# Patient Record
Sex: FEMALE | Race: OTHER | Employment: UNEMPLOYED | URBAN - METROPOLITAN AREA
[De-identification: names, ages, dates, MRNs, and addresses within clinical notes are randomized per-mention and may not be internally consistent; named-entity substitution may affect disease eponyms.]

---

## 2017-03-25 ENCOUNTER — APPOINTMENT (OUTPATIENT)
Dept: GENERAL RADIOLOGY | Age: 37
End: 2017-03-25
Attending: EMERGENCY MEDICINE
Payer: SUBSIDIZED

## 2017-03-25 ENCOUNTER — HOSPITAL ENCOUNTER (EMERGENCY)
Dept: GENERAL RADIOLOGY | Age: 37
End: 2017-03-25
Attending: EMERGENCY MEDICINE
Payer: SUBSIDIZED

## 2017-03-25 ENCOUNTER — HOSPITAL ENCOUNTER (EMERGENCY)
Age: 37
Discharge: HOME | End: 2017-03-25
Attending: EMERGENCY MEDICINE
Payer: SUBSIDIZED

## 2017-03-25 ENCOUNTER — APPOINTMENT (OUTPATIENT)
Dept: CT IMAGING | Age: 37
End: 2017-03-25
Attending: EMERGENCY MEDICINE
Payer: SUBSIDIZED

## 2017-03-25 VITALS
SYSTOLIC BLOOD PRESSURE: 125 MMHG | HEIGHT: 60 IN | TEMPERATURE: 98.2 F | OXYGEN SATURATION: 99 % | HEART RATE: 81 BPM | WEIGHT: 184.4 LBS | BODY MASS INDEX: 36.2 KG/M2 | DIASTOLIC BLOOD PRESSURE: 82 MMHG | RESPIRATION RATE: 15 BRPM
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
## 2017-03-25 DIAGNOSIS — S00.83XA FACIAL CONTUSION, INITIAL ENCOUNTER: ICD-10-CM

## 2017-03-25 PROCEDURE — 75810000275 HC EMERGENCY DEPT VISIT NO LEVEL OF CARE

## 2017-03-25 PROCEDURE — 90715 TDAP VACCINE 7 YRS/> IM: CPT | Performed by: EMERGENCY MEDICINE

## 2017-03-25 PROCEDURE — 70486 CT MAXILLOFACIAL W/O DYE: CPT

## 2017-03-25 PROCEDURE — 74011250636 HC RX REV CODE- 250/636: Performed by: EMERGENCY MEDICINE

## 2017-03-25 PROCEDURE — 99284 EMERGENCY DEPT VISIT MOD MDM: CPT

## 2017-03-25 PROCEDURE — 70450 CT HEAD/BRAIN W/O DYE: CPT

## 2017-03-25 PROCEDURE — 90471 IMMUNIZATION ADMIN: CPT

## 2017-03-25 RX ADMIN — TETANUS TOXOID, REDUCED DIPHTHERIA TOXOID AND ACELLULAR PERTUSSIS VACCINE, ADSORBED 0.5 ML: 5; 2.5; 8; 8; 2.5 SUSPENSION INTRAMUSCULAR at 03:58
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
## 2017-03-25 NOTE — ED NOTES
Patients neighbor is at bedside, she is providing support and translating for patient.       Patient given water,

## 2017-03-25 NOTE — ED NOTES
MD reviewed discharge instructions and options with patient and patient verbalized understanding. RN reviewed discharge instructions using teachback method. Pt ambulated to exit without difficulty and in no signs of acute distress escorted by family. No complaints or needs expressed at this time.
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
## 2017-03-25 NOTE — DISCHARGE INSTRUCTIONS
Lesión en la roxie: Instrucciones de cuidado - [ Head Injury: Care Instructions ]  Instrucciones de cuidado  La mayoría de las lesiones en la roxie son [place]. [employer], los carr y los raspones en la roxie y la mariluz suelen sanar keanu y pueden tratarse igual que las lesiones en otras partes del cuerpo. Aunque es raro, de vez en cuando puede aparecer un problema más anjel después de foster regreso al [facility]. Por eso, es scott buena idea estar atento a síntomas por un [address] La atención de seguimiento es scott parte clave de foster tratamiento y seguridad. Asegúrese de hacer y acudir a todas las citas, y llame a foster médico si está teniendo problemas. También es scott buena idea saber los resultados de jerilyn exámenes y mantener scott lista de los medicamentos que jessica. ¿Cómo puede cuidarse en el hogar? · Siga las instrucciones de foster médico. Él o aditya le dirá si necesita de alguien que lo observe atentamente fatemeh las 24 horas o más [place]. · No se esfuerce por unos días o más tiempo si no se siente keanu. · Pregúntele a foster médico cuándo puede volver a actividades mohan manejar un coche, montar en bicicleta u operar maquinaria. ¿Cuándo debe pedir ayuda? Llame al 911 en cualquier momento que considere que necesita atención de [place]. Por ejemplo, llame si:  · Tiene un episodio de convulsiones. · Se desmayó (perdió el conocimiento). · Se siente confuso o no puede mantenerse despierto. Llame a foster médico ahora mismo o busque atención médica inmediata si:  · Tiene nuevo o peor vómito. · Se siente menos alerta. · Tiene nueva debilidad o entumecimiento en cualquier parte del cuerpo. Preste especial atención a los [facility], y asegúrese de comunicarse con foster médico si:  · No mejora mohan esperaba. · Tiene nuevos síntomas, mohan grzegorz de [place], problemas para concentrarse o cambios en el estado de ánimo. ¿Dónde puede encontrar más información en inglés?   [place] a http://damion-david.info/. Phyllis Allen W653 en la búsqueda para aprender más acerca de \"Lesión en la roxie: Instrucciones de cuidado - [ Head Injury: Care Instructions ]. \"  Revisado: 7 septiembre, 2016  Versión del contenido: 11.1  © 2446-7424 Healthwise, Incorporated. Las instrucciones de cuidado fueron adaptadas bajo licencia por [employer] (which disclaims liability or warranty for this information). Si usted tiene [place] [employer] afección médica o sobre estas instrucciones, siempre pregunte a foster profesional de frederic. Healthwise, Incorporated niega toda garantía o responsabilidad por foster uso de esta información. Hematoma: Instrucciones de cuidado - [ Hematoma: Care Instructions ]  Instrucciones de cuidado  Un hematoma es un moretón anjel. [place] sucede cuando scott lesión hace que se acumule mae bajo la piel. La mae [employer] a la piel scott sensación esponjosa, gomosa y abultada. Un hematoma generalmente no es motivo de preocupación. No es lo mismo que un coágulo de mae en scott vena, y no causa coágulos de [place]. La atención de seguimiento es scott parte clave de foster tratamiento y seguridad. Asegúrese de hacer y acudir a todas las citas, y llame a foster médico si está teniendo problemas. También es scott buena idea saber los resultados de los exámenes y mantener scott lista de los medicamentos que jessica. ¿Cómo puede cuidarse en el hogar? · Descanse y proteja la georgi amoratada. · Colóquese hielo o scott compresa fría sobre la georgi fatemeh 10 a 20 minutos cada vez. · Apoye la georgi amoratada sobre scott almohada mientras se aplica hielo o en cualquier momento que tome asiento o se acueste fatemeh los 3 días siguientes. Trate de mantenerla por encima del nivel del corazón. [place] ayudará a reducir la hinchazón. · Envolver la georgi amoratada con un vendaje elástico, mohan el \"Ace\", ayudará a disminuir la hinchazón.  No lo ajuste demasiado, ya que puede ocasionar mayor hinchazón debajo de la georgi afectada. · [place] un analgésico (medicamento para el dolor) de venta subha, mohan acetaminofén (Tylenol), ibuprofeno (Advil, Motrin) o naproxeno (Aleve). · No tome dos o más analgésicos al mismo tiempo a menos que el médico se lo haya indicado. Muchos analgésicos contienen acetaminofén, es decir, Tylenol. El exceso de acetaminofén (Tylenol) puede ser dañino. ¿Cuándo debe pedir ayuda? Llame a foster médico ahora mismo o busque atención médica inmediata si:  · Tiene señales de infección en la piel, tales mohan:  ¨ Aumento del dolor, la hinchazón, el enrojecimiento o la temperatura. ¨ Vetas rojizas que comienzan en la georgi. ¨ Pus que supura de la georgi. Mitra Patino. Preste especial atención a los cambios en foster frederic y asegúrese de comunicarse con foster médico si:  · El moretón le ha durado más de 4 semanas. · El moretón se hace más ramos o más doloroso. · No mejora mohan se esperaba. ¿Dónde puede encontrar más información en inglés? Elizabeth Perez a http://damion-david.info/. Heber Ulysses P911 en la búsqueda para aprender más acerca de \"Hematoma: Instrucciones de cuidado - [ Hematoma: Care Instructions ]. \"  Revisado: 27 cuevas, 2016  Versión del contenido: 11.1  © 0596-4256 Healthwise, Incorporated. Las instrucciones de cuidado fueron adaptadas bajo licencia por [employer] (which disclaims liability or warranty for this information). Si usted tiene [place] afección médica o sobre estas instrucciones, siempre pregunte a foster profesional de frederic. Healthwise, Incorporated niega toda garantía o responsabilidad por foster uso de esta información.
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
## 2017-08-06 DIAGNOSIS — E11.65 TYPE 2 DIABETES MELLITUS WITH HYPERGLYCEMIA, WITHOUT LONG-TERM CURRENT USE OF INSULIN (HCC): Primary | ICD-10-CM

## 2017-08-07 ENCOUNTER — OFFICE VISIT (OUTPATIENT)
Dept: FAMILY MEDICINE CLINIC | Age: 37
End: 2017-08-07

## 2017-08-07 ENCOUNTER — HOSPITAL ENCOUNTER (OUTPATIENT)
Dept: LAB | Age: 37
Discharge: HOME | End: 2017-08-07

## 2017-08-07 VITALS
WEIGHT: 194 LBS | HEIGHT: 60 IN | HEART RATE: 79 BPM | DIASTOLIC BLOOD PRESSURE: 64 MMHG | TEMPERATURE: 98.2 F | BODY MASS INDEX: 38.09 KG/M2 | SYSTOLIC BLOOD PRESSURE: 104 MMHG

## 2017-08-07 DIAGNOSIS — R30.0 BURNING WITH URINATION: ICD-10-CM

## 2017-08-07 DIAGNOSIS — E11.65 TYPE 2 DIABETES MELLITUS WITH HYPERGLYCEMIA, WITHOUT LONG-TERM CURRENT USE OF INSULIN (HCC): ICD-10-CM

## 2017-08-07 DIAGNOSIS — E11.65 TYPE 2 DIABETES MELLITUS WITH HYPERGLYCEMIA, WITHOUT LONG-TERM CURRENT USE OF INSULIN (HCC): Primary | ICD-10-CM

## 2017-08-07 LAB
BILIRUB UR QL STRIP: NEGATIVE
EST. AVERAGE GLUCOSE BLD GHB EST-MCNC: 171 MG/DL
GLUCOSE POC: NORMAL MG/DL
GLUCOSE UR-MCNC: NEGATIVE MG/DL
HBA1C MFR BLD: 7.6 % (ref 4.2–6.3)
KETONES P FAST UR STRIP-MCNC: NEGATIVE MG/DL
PH UR STRIP: 5.5 [PH] (ref 4.6–8)
PROT UR QL STRIP: NEGATIVE MG/DL
SP GR UR STRIP: 1.02 (ref 1–1.03)
UA UROBILINOGEN AMB POC: NORMAL (ref 0.2–1)
URINALYSIS CLARITY POC: CLEAR
URINALYSIS COLOR POC: YELLOW
URINE BLOOD POC: NEGATIVE
URINE LEUKOCYTES POC: NEGATIVE
URINE NITRITES POC: NEGATIVE

## 2017-08-07 PROCEDURE — 83036 HEMOGLOBIN GLYCOSYLATED A1C: CPT | Performed by: NURSE PRACTITIONER

## 2017-08-07 RX ORDER — METFORMIN HYDROCHLORIDE 500 MG/1
1000 TABLET, EXTENDED RELEASE ORAL
Qty: 180 TAB | Refills: 1 | Status: SHIPPED | OUTPATIENT
Start: 2017-08-07 | End: 2018-02-26 | Stop reason: SDUPTHER

## 2017-08-07 NOTE — PROGRESS NOTES
[facility] clinic - pt given an appointment October 26th at 9.45 am.  Pt given a copy of the referral form and asked to take $15 to the appointment. Advised that her eyes will be dilated and she needs to have someone to drive her home.

## 2017-08-07 NOTE — PROGRESS NOTES
Assessment/Plan:       ICD-10-CM ICD-9-CM    1. Type 2 diabetes mellitus with hyperglycemia, without long-term current use of insulin ([facility]) E11.65 250.00 AMB POC GLUCOSE, QUANTITATIVE, BLOOD     790.29 HEMOGLOBIN A1C WITH EAG      metFORMIN ER (GLUCOPHAGE XR) 500 mg tablet       DIABETES EYE EXAM   2. Burning with urination R30.0 788.1 AMB POC URINALYSIS DIP STICK AUTO W/O MICRO    Greater than 50% of this 25 minute visit was spent in face-to-face counseling/coordination of care regarding diabetes management. Follow-up Disposition:  Return in about 3 months (around 11/7/2017) for diabetes. [facility]  Subjective:   Shawn Morrison is a 44 y.o. OTHER female who speaks Montserratian. States she was recently at [facility] and failed the eye test.  Chief Complaint   Patient presents with    Diabetes   Last took metformin on Friday. When she  Outpatient Medications Prior to Visit   Medication Sig Dispense Refill    metFORMIN (GLUCOPHAGE) 500 mg tablet Take 1 Tab by mouth two (2) times daily (with meals). Pt states that she takes 1 or 2 a day depending on her diet 60 Tab 1     No facility-administered medications prior to visit. Last ate: today. Measuring glucoses? No Brought in medications? no Last took them: Friday Taking as prescribed? no    .Activity/exercise: no Work/employment: yes 2013, [employer], placed in [place]. Has had pain in the lower abdomen for 2 weeks. She has had more itching when she urinates. ROS:   no polyuria or polydipsia, no chest pain, dyspnea or TIA's, no numbness, tingling or pain in extremities. Social History: She reports that she has never smoked. She does not have any smokeless tobacco history on file. She reports that she does not drink alcohol or use illicit drugs. Family History: Her family history is not on file.   Surgical History:   Past Surgical History:   Procedure Laterality Date    HX APPENDECTOMY       Objective:     Vitals:    08/07/17 1511   BP: 104/64 Pulse: 79   Temp: 98.2 °F (36.8 °C)   TempSrc: Oral   Weight: 194 lb (88 kg)   Height: 5' (1.524 m)    No LMP recorded (lmp unknown). She has an implant (IUD). Results for orders placed or performed in visit on 08/07/17   AMB POC GLUCOSE, QUANTITATIVE, BLOOD   Result Value Ref Range    Glucose  non fasting mg/dL   AMB POC URINALYSIS DIP STICK AUTO W/O MICRO   Result Value Ref Range    Color (UA POC) Yellow     Clarity (UA POC) Clear     Glucose (UA POC) Negative Negative    Bilirubin (UA POC) Negative Negative    Ketones (UA POC) Negative Negative    Specific gravity (UA POC) 1.020 1.001 - 1.035    Blood (UA POC) Negative Negative    pH (UA POC) 5.5 4.6 - 8.0    Protein (UA POC) Negative Negative mg/dL    Urobilinogen (UA POC) 0.2 mg/dL 0.2 - 1    Nitrites (UA POC) Negative Negative    Leukocyte esterase (UA POC) Negative Negative      Wt Readings from Last 2 Encounters:   08/07/17 194 lb (88 kg)   03/25/17 184 lb 6.4 oz (83.6 kg)    Weight increased; Hemoglobin A1c   Date Value Ref Range Status   06/07/2016 8.1 (H) 4.2 - 6.3 % Final    No results found for: GESTF, MCACR, MCA1, MCA2, MCA3, MCAU, OBINNA, CREAPOC, MCREA, ACREA, CREA, REFC3, REFC4 No results found for: CGFR, GFRAA, GFRNA  No results found for: CHOL, CHOLX, CHLST, CHOLV, HDL, LDL, LDLC, DLDLP, TGLX, TRIGL, TRIGP, CHHD, CHHDX No results found for: GPT, ALT, SGOT, GGT, GGTP, AP, APIT, APX, CBIL, TBIL, TBILI  Constitutional: She appears well-developed. Eyes: EOM are normal. Pupils are equal, round, and reactive to light. Neck: Neck supple. No thyromegaly present. Cardiovascular: Normal rate, regular rhythm, normal heart sounds and intact distal pulses. No murmur heard. Pulmonary/Chest: Effort normal and breath sounds normal.   Musculoskeletal: She exhibits no edema. No ulcers of the lower extremities. Assessment/Plan:   Diagnoses and all orders for this visit:    1.  Type 2 diabetes mellitus with hyperglycemia, without long-term current use of insulin (HCC)  -     AMB POC GLUCOSE, QUANTITATIVE, BLOOD  -     HEMOGLOBIN A1C WITH EAG; Future  -     metFORMIN ER (GLUCOPHAGE XR) 500 mg tablet; Take 2 Tabs by mouth daily (with dinner). For diabetes Urdu sig  -     HM DIABETES EYE EXAM    2. Burning with urination  -     AMB POC URINALYSIS DIP STICK AUTO W/O MICRO    Diabetes Mellitus type 2, under uncertain  control. Blood pressure under Good control. Discussed OK to take 1 metformin po qday; most importantly, she needs to take medication every day. Greater than 50% of this 25 minute visit was spent in face-to-face counseling/coordination of care regarding diabetes management. Follow-up Disposition:  Return in about 3 months (around 11/7/2017) for diabetes. Ksenia Siddiqui DNP, FNP-BC, BC-ADM  Brock Hung expressed understanding of this plan.

## 2017-08-07 NOTE — MR AVS SNAPSHOT
Visit Information Monetta Gottron y [place] Personal Médico Departamento Teléfono del Dep. Número de visita 8/7/2017  3:15 PM Vinny Merino [phone] Upcoming Health Maintenance Date Due  
 LIPID PANEL Q1 1979 FOOT EXAM Q1 5/27/1989 MICROALBUMIN Q1 5/27/1989 EYE EXAM RETINAL OR DILATED Q1 5/27/1989 Pneumococcal 19-64 Medium Risk (1 of 1 - PPSV23) 5/27/1998 HEMOGLOBIN A1C Q6M 12/7/2016 INFLUENZA AGE 9 TO ADULT 8/1/2017 PAP AKA CERVICAL CYTOLOGY 6/7/2019 DTaP/Tdap/Td series (2 - Td) 3/25/2027 Alergias  Review Complete El: 8/7/2017 Por: Aileen Almonte LPN A partir del:  8/7/2017 No Known Allergies Vacunas actuales Steve Mean Vickye Ramp Tdap 3/25/2017  3:58 AM  
  
 No revisadas esta visita You Were Diagnosed With   
  
 Agustina Kaplan Type 2 diabetes mellitus with hyperglycemia, without long-term current use of insulin (HCC)    -  Primary ICD-10-CM: E11.65 ICD-9-CM: 250.00, 790.29 Burning with urination     ICD-10-CM: R30.0 ICD-9-CM: 318. 1 Partes vitales PS Pulso Temperatura [place] ( percentil de crecimiento) Peso (percentil de crecimiento) LMP (última anai) 104/64 79 98.2 °F (36.8 °C) (Oral) 5' (1.524 m) 194 lb (88 kg) (LMP Unknown) BMI [facility]) Estado obstétrico Estatus de tabaquísmo 37.89 kg/m2 Having regular periods Never Smoker Historial de signos vitales BMI and BSA Data Body Mass Index Body Surface Area  
 37.89 kg/m 2 1.93 m 2 [facility] Name Phone [facility] [address] [place] [address] [phone] Harris lista de medicamentos actualizada Lista actualizada el: 8/7/17  3:58 PM.  Shaun Fulton use harris lista de medicamentos más reciente. metFORMIN  mg tablet También conocido mohan:  GLUCOPHAGE XR  
 Take 2 Tabs by mouth daily (with dinner). For diabetes Mongolian sig Recetas Enviado a la [place] Refills  
 metFORMIN ER (GLUCOPHAGE XR) 500 mg tablet 1 Sig: Take 2 Tabs by mouth daily (with dinner). For diabetes Mongolian sig Class: Normal  
 Pharmacy: [address] #: [phone] Route: Oral  
  
Hicimos lo siguiente AMB POC GLUCOSE, QUANTITATIVE, BLOOD [37568 CPT(R)] AMB POC URINALYSIS DIP STICK AUTO W/O MICRO [70751 CPT(R)] Introducing [facility] [place] [facility]! [facility] introduce portal paciente MyChart . Ahora se puede acceder a partes de foster expediente médico, enviar por correo electrónico la oficina de foster médico y solicitar renovaciones de medicamentos en línea. En foster navegador de Internet , Jimmy Lopez a [email or web address] Cuca clic en el usuario por Margarite [place]? Humberto Deep clic aquí en la sesión Jnena Due. Verá la página de registro [place]. Ingrese fotser código de [employer] michael y mohan aparece a continuación. Usted no tendrá que [employer] código después de deandra completado el proceso de registro . Si usted no se inscribe antes de la fecha de caducidad , debe solicitar un nuevo código. · MyChart Código de acceso : [id number] Expires: 11/5/2017  3:58 PM 
 
Ingresa los últimos cuatro dígitos de foster Número de Seguro Social ( xxxx ) y fecha de nacimiento ( dd / mm / aaaa ) mohan se indica y cuca clic en Enviar. Usted será llevado a la siguiente página de registro . Crear un ID MyChart . Esta será foster ID de inicio de sesión de MyChart y no puede ser [place] , por lo que pensar en scott que es [facility] y fácil de recordar . Crear scott contraseña MyChart . Usted puede cambiar foster contraseña en cualquier momento . Ingrese foster Password Reset de preguntas y [place] . [place] se puede utilizar en un momento posterior si usted olvida foster contraseña. Introduzca foster dirección de correo electrónico . Radha Fabry recibirá scott notificación por correo electrónico cuando la nueva información está disponible en MyChart . Mitchell Reina clic en Registrarse. Corena Connell becki y descargar porciones de foster expediente médico. 
Alison clic en el enlace de descarga del menú Resumen para descargar scott copia portátil de foster información médica . Si tiene [employer] , por favor visite la sección de preguntas frecuentes del sitio web MyChart . Recuerde, MyChart NO es que se utilizará para las necesidades urgentes. Para emergencias médicas , llame al 911 . Ahora disponible en foster iPhone y Android ! Por favor proporcione raul resumen de la documentación de cuidado a foster próximo proveedor. Your primary care clinician is listed as NONE. If you have any questions after today's visit, please call [phone].

## 2017-08-07 NOTE — PROGRESS NOTES
Chief Complaint   Patient presents with    Diabetes     Coordination of Care  1. Have you been to the ER, urgent care clinic since your last visit? Hospitalized since your last visit? No    2. Have you seen or consulted any other health care providers outside of the [address] [place] [address] since your last visit? Include any pap smears or colon screening. No    Medications  Medication Reconciliation Performed: yes  Patient does not need refills     Learning Assessment Complete?  yes

## 2017-09-28 ENCOUNTER — OFFICE VISIT (OUTPATIENT)
Dept: FAMILY MEDICINE CLINIC | Age: 37
End: 2017-09-28

## 2017-09-28 VITALS
BODY MASS INDEX: 39.07 KG/M2 | WEIGHT: 199 LBS | TEMPERATURE: 97.8 F | DIASTOLIC BLOOD PRESSURE: 86 MMHG | HEART RATE: 89 BPM | HEIGHT: 60 IN | SYSTOLIC BLOOD PRESSURE: 104 MMHG

## 2017-09-28 DIAGNOSIS — K04.7 DENTAL INFECTION: ICD-10-CM

## 2017-09-28 DIAGNOSIS — M79.641 CHRONIC HAND PAIN, RIGHT: Primary | ICD-10-CM

## 2017-09-28 DIAGNOSIS — G89.29 CHRONIC HAND PAIN, RIGHT: Primary | ICD-10-CM

## 2017-09-28 PROBLEM — Z98.890 H/O CARPAL TUNNEL REPAIR: Status: ACTIVE | Noted: 2017-09-28

## 2017-09-28 RX ORDER — NAPROXEN 500 MG/1
500 TABLET ORAL 2 TIMES DAILY WITH MEALS
Qty: 40 TAB | Refills: 1 | Status: SHIPPED | OUTPATIENT
Start: 2017-09-28 | End: 2018-02-25

## 2017-09-28 RX ORDER — AMOXICILLIN 500 MG/1
500 CAPSULE ORAL 3 TIMES DAILY
Qty: 30 CAP | Refills: 0 | Status: SHIPPED | OUTPATIENT
Start: 2017-09-28 | End: 2017-10-08

## 2017-09-28 NOTE — PROGRESS NOTES
Subjective:     Chief Complaint   Patient presents with    Gum Problem     pt c/o pain and swelling on left side of mouth    Wrist Pain     pt c/o pain on wrist and right arm        She  is a 44 y.o. female who presents for evaluation of L sided mouth/face pain. Onset yesterday and denies any new or recent dental work. ROS  Gen - no fever/chills  Resp - no dyspnea or cough  CV - no chest pain or DOW  Rest per HPI    Past Medical History:   Diagnosis Date    Diabetes ([address]     IUD (intrauterine device) since 2013      Past Surgical History:   Procedure Laterality Date    HX APPENDECTOMY       Current Outpatient Prescriptions on File Prior to Visit   Medication Sig Dispense Refill    metFORMIN ER (GLUCOPHAGE XR) 500 mg tablet Take 2 Tabs by mouth daily (with dinner). For diabetes Latvian sig 180 Tab 1     No current facility-administered medications on file prior to visit. Objective:     Vitals:    09/28/17 1540   BP: 104/86   Pulse: 89   Temp: 97.8 °F (36.6 °C)   TempSrc: Oral   Weight: 199 lb (90.3 kg)   Height: 5' (1.524 m)       Physical Examination:  General appearance - alert, well appearing, and in no distress  Eyes -sclera anicteric  Neck - supple, no significant adenopathy, no thyromegaly  Chest - clear to auscultation, no wheezes, rales or rhonchi, symmetric air entry  Heart - normal rate, regular rhythm, normal S1, S2, no murmurs, rubs, clicks or gallops  Neurological - alert, oriented, no focal findings or movement disorder noted    Noted scarring on R medial wrist at radial margin, + tenderness  Upper L rear molar appears impacted, generally poor dentition      Assessment/ Plan:   Diagnoses and all orders for this visit:    1. Chronic hand pain, right  -     REFERRAL TO ORTHOPEDICS  -     XR WRIST RT AP/LAT; Future  -     XR HAND RT MIN 3 V; Future  -     naproxen (NAPROSYN) 500 mg tablet; Take 1 Tab by mouth two (2) times daily (with meals).     2. Dental infection  - amoxicillin (AMOXIL) 500 mg capsule; Take 1 Cap by mouth three (3) times daily for 10 days. Given Pt's Hx of R carpal tunnel repair, suspect her chronic pain r/t scar tissue. Refer for plain films and AN for ortho eval given low possibility steroid injections would resolve her S&S per her Hx. PRN naproxen and non-pharm pain relief discussed. Refer to DDS for formal eval, resource sheet given. I have discussed the diagnosis with the patient and the intended plan as seen in the above orders. The patient has received an after-visit summary and questions were answered concerning future plans. I have discussed medication side effects and warnings with the patient as well. The patient verbalizes understanding and agreement with the plan.     Follow-up Disposition: Not on File

## 2017-09-28 NOTE — PROGRESS NOTES
Coordination of Care  1. Have you been to the ER, urgent care clinic since your last visit? Hospitalized since your last visit? No    2. Have you seen or consulted any other health care providers outside of the [address] [place] [address] since your last visit? Include any pap smears or colon screening. No    Medications  Medication Reconciliation Performed: no  Patient does not know need refills     Learning Assessment Complete?  yes

## 2017-09-28 NOTE — MR AVS SNAPSHOT
Visit Information Desiree Reid Personal Médico Departamento Teléfono del Dep. Número de visita 9/28/2017  3:35 PM Vinny Rodriguez [facility] [phone] [id number] Follow-up Instructions Return if symptoms worsen or fail to improve. Upcoming Health Maintenance Date Due  
 LIPID PANEL Q1 1980 FOOT EXAM Q1 5/27/1989 MICROALBUMIN Q1 5/27/1989 EYE EXAM RETINAL OR DILATED Q1 5/27/1989 Pneumococcal 19-64 Medium Risk (1 of 1 - PPSV23) 5/27/1998 INFLUENZA AGE 9 TO ADULT 8/1/2017 HEMOGLOBIN A1C Q6M 2/7/2018 PAP AKA CERVICAL CYTOLOGY 6/7/2019 DTaP/Tdap/Td series (2 - Td) 3/25/2027 Alergias  Review Complete El: 9/28/2017 Por: MAYRA Rodriguez del:  9/28/2017 No Known Allergies Vacunas actuales Mukund Ogden Tdap 3/25/2017  3:58 AM  
  
 No revisadas esta visita You Were Diagnosed With   
  
 Myra Orn Chronic hand pain, right    -  Primary ICD-10-CM: M79.641, G89.29 ICD-9-CM: 729.5, 338.29 Dental infection     ICD-10-CM: K04.7 ICD-9-CM: 522.4 Partes vitales PS Pulso Temperatura [place] ( percentil de crecimiento) Peso (percentil de crecimiento) LMP (última anai) 104/86 (BP 1 Location: Left arm) 89 97.8 °F (36.6 °C) (Oral) 5' (1.524 m) 199 lb (90.3 kg) 09/13/2017 BMI [facility]) Estado obstétrico Estatus de tabaquísmo 38.86 kg/m2 Having regular periods Never Smoker Historial de signos vitales BMI and BSA Data Body Mass Index Body Surface Area  
 38.86 kg/m 2 1.96 m 2 Taina Yates [facility] Name Phone [facility] [address] [place] [address] [phone] Harris lista de medicamentos actualizada Lista actualizada el: 9/28/17  4:25 PM.  Jadynshannoncee Koehlerch use harris lista de medicamentos más reciente. amoxicillin 500 mg capsule También conocido mohan:  AMOXIL Take 1 Cap by mouth three (3) times daily for 10 days. metFORMIN  mg tablet También conocido mohan:  GLUCOPHAGE XR Take 2 Tabs by mouth daily (with dinner). For diabetes Divehi sig  
  
 naproxen 500 mg tablet También conocido mohan:  NAPROSYN Take 1 Tab by mouth two (2) times daily (with meals). Recetas Enviado a la [place] Refills  
 amoxicillin (AMOXIL) 500 mg capsule 0 Sig: Take 1 Cap by mouth three (3) times daily for 10 days. Class: Normal  
 Pharmacy: [facility] [place] [address] Ph #: [phone] Route: Oral  
 naproxen (NAPROSYN) 500 mg tablet 1 Sig: Take 1 Tab by mouth two (2) times daily (with meals). Class: Normal  
 Pharmacy: [facility] [address] [place] [address] Ph #: [phone] Route: Oral  
  
Hicimos lo siguiente REFERRAL TO ORTHOPEDICS [CND835 Custom] Comentarios: Access Now Referral Request Form: 
 
Has the patient live in the area for 6 months Yes Is the patient here on a visa No 
 
Priority: 
 
4 weeks Location: [facility] Patient Demographics: 
 
Date:  9/28/2017                          EMR# [id number] Audra Taveras 1980 Home Phone : [phone]             Cell Phone:   
 
Language :  Divehi Specialist Requested:  Ortho hand Reason for Request:  Chronic hand pain s/p carpal tunnel repair in 2004 Specialist Requirements: 
 
If GYN Referral:   
Pap: n/a If Ortho Referral: MRI no      
XRAY: yes Pulmonary Referrals must have :  
C-X-ray no OR  
CT Scan no Referring Provider:  Carlos Sterling NP Instrucciones de seguimiento Return if symptoms worsen or fail to improve. Por hacer 10/06/2017 Imaging:  XR HAND RT MIN 3 V   
  
 10/06/2017 Imaging:  XR WRIST RT AP/LAT Informacion de [employer]  Codigo de Referencia Referido por Referido a  
  
 [id number] Mervat Perez No disponible Visitas Estado [facility] de inicio [facility] final  
 1 New Request 9/28/17 9/28/18 Si foster referencia tiene un estado de \"pending review\" o \"denied\" , informacion adicional sera enviada para apoyar el resultado de esta decision. Instrucciones para el Paciente [facility] dental - [ [address] [place] [address] ] Qué es el cuidado dental básico? 
 
El cuidado dental básico consiste en cepillarse los dientes y pasarse el hilo dental regularmente para eliminar la placa. La placa es scott capa delgada de bacterias que se adhiere a los dientes por encima y por debajo de la línea de las encías. Puede acumularse y endurecerse hasta convertirse en sarro, lo cual hace más difícil limpiar keanu los dientes. El sarro generalmente tiene que ser Geroldine Sarthak por un higienista dental. 
Las bacterias en la placa usan azúcares para producir ácidos. Estos ácidos pueden dañar las encías y los dientes. Asegúrese de visitar a foster dentista y a foster higienista dental para controles y limpiezas regulares. Cómo puede afectar a foster frederic el cuidado dental? 
La práctica del cuidado dental básico: · Elimina la placa que puede causar enfermedad de las encías y caries dental. La caries dental puede conducir a la formación de un agujero en el diente. · Ayuda a prevenir el mal aliento. Cepillarse los dientes y usar hilo dental eliminan de la boca las bacterias que causan el mal [place]. · Ayuda a mantener los dientes blancos previniendo las [facility] Floor a los Bystr[place] u Policky, las bebidas y el tabaco. 
· Hace posible que los dientes ciara toda la bell. Qué puede hacer para prevenir los problemas dentales? · Cepíllese los [place] al día, por la mañana y por la noche. ¨ Utilice un cepillo de dientes con cerdas suaves y redondeadas y [facility] roxie que sea lo suficientemente pequeña mohan para llegar a todas las partes de los dientes y la boca. ¨ Reemplace foster cepillo de dientes cada 3 o 4 meses. ¨ Utilice scott pasta dental con flúor. ¨ Coloque el cepillo en un ángulo de 39 grados sobre el lugar donde los dientes se unen a las encías. Presione con firmeza y [place] suavemente el cepillo hacia atrás y hacia adelante con pequeños movimientos circulares. ¨ Cepille vigorosamente las superficies de masticación con trazos cortos [place] y [place] atrás. ¨ Cepíllese la lengua de atrás [place]. · Pásese el hilo dental al menos scott vez al día. Elija el tipo y el sabor que más le guste. · Programe controles y limpiezas con la frecuencia que le recomiende foster dentista. · Siga scott dieta saludable para ayudar a mantener las encías sanas y los dientes isatu. Opte por alimentos que amada buenos para jerilyn dientes, mohan granos integrales, verduras, frutas así mohan alimentos que tengan un bajo contenido de grasas saturadas y Marie. El [address] [place] [address] y otros quesos, la mantequilla de cacahuate [place]), el yogur y Mat Miles son buenos para los dientes. Los chicles sin azúcar (especialmente los que tienen [place]) también son Natalie Bogus buena opción. · Evite los alimentos que contienen mucha azúcar, especialmente los alimentos dulces y pegajosos mohan los caramelos blandos. · No coma refrigerios antes de acostarse. La comida que [employer] dientes tiene más probabilidad de causar caries fatemeh la noche. · No fume ni consuma productos de tabaco sin humo. El tabaco puede empeorar las caries dentales. Si necesita ayuda para dejar de fumar, hable con foster médico sobre programas y medicamentos para dejar de fumar. Estos pueden aumentar jerilyn probabilidades de dejar el hábito para siempre. Dónde puede encontrar más información en inglés? Michael Nations a http://damion-david.info/. Kentrell Cortez N755 en la búsqueda para aprender más acerca de \"Aprenda sobre el cuidado dental - [ Valeen Fairly ]. \" 
Revisado: 9 josé, 2016 Versión del contenido: 11.3 © 7652-3363 Healthwise, Incorporated. Las instrucciones de cuidado fueron adaptadas bajo licencia por [employer]ctions (which disclaims liability or warranty for this information). Si usted tiene [place] afección médica o sobre estas instrucciones, siempre pregunte a foster profesional de frederic. Healthwise, Incorporated niega toda garantía o responsabilidad por foster uso de esta información. Introducing [facility]! Giorgio Frances introduce portal paciente MyChart . Ahora se puede acceder a partes de foster expediente médico, enviar por correo electrónico la oficina de foster médico y solicitar renovaciones de medicamentos en línea. En foster navegador de Internet , Pedro Barnhart a [email or web address] Cuca clic en el usuario por Riposmany Zepeda? Chong dioric aquí en la sesión Muna Nereyda. Verá la página de registro [place]. Ingrese foster código de Bank of Jaylin michael y mohan aparece a continuación. Usted no tendrá que [employer] código después de deandra completado el proceso de registro . Si usted no se inscribe antes de la fecha de caducidad , debe solicitar un nuevo código. · MyChart Código de acceso : [id number] Expires: 11/5/2017  3:58 PM 
 
Ingresa los últimos cuatro dígitos de foster Número de Seguro Social ( xxxx ) y fecha de nacimiento ( dd / mm / aaaa ) mohan se indica y cuca clic en Enviar. Kiet será llevado a la siguiente página de registro . Crear un ID MyChart . Esta será foster ID de inicio de sesión de MyChart y no puede ser [place] , por lo que pensar en scott que es Vilma Mood y fácil de recordar . Crear scott contraseña MyChart . Usted puede cambiar foster contraseña en cualquier momento . Ingrese foster Password Reset de preguntas y Ascencio . [place] se puede utilizar en un momento posterior si usted olvida foster contraseña. Introduzca foster dirección de correo electrónico . Carey Rosado recibirá scott notificación por correo electrónico cuando la nueva información está disponible en MyChart . Estil Brayden ruiz en Registrarse. Lattie Melter becki y descargar porciones de foster expediente médico. 
Alison joseph en el enlace de descarga del menú Resumen para descargar scott copia portátil de foster información médica . Si tiene [employer] , por favor visite la sección de preguntas frecuentes del sitio web MyChart . Recuerde, MyChart NO es que se utilizará para las necesidades urgentes. Para emergencias médicas , llame al 911 . Ahora disponible en foster iPhone y Android ! Por favor proporcione raul resumen de la documentación de cuidado a foster próximo proveedor. Your primary care clinician is listed as NONE. If you have any questions after today's visit, please call [phone].

## 2017-09-28 NOTE — PATIENT INSTRUCTIONS
Aprenda sobre el cuidado dental - [ [facility] ]  ¿Qué es el cuidado dental básico?    El cuidado dental básico consiste en cepillarse los dientes y pasarse el hilo dental regularmente para eliminar la placa. La placa es scott capa delgada de bacterias que se adhiere a los dientes por encima y por debajo de la línea de las encías. Puede acumularse y endurecerse hasta convertirse en sarro, lo cual hace más difícil limpiar keanu los dientes. El sarro generalmente tiene que ser Geroldine [place] por un higienista dental.  Las bacterias en la placa usan azúcares para producir ácidos. Estos ácidos pueden dañar las encías y los dientes. Asegúrese de visitar a foster dentista y a foster higienista dental para controles y limpiezas regulares. ¿Cómo puede afectar a foster frederic el cuidado dental?  La práctica del cuidado dental básico:  · Elimina la placa que puede causar enfermedad de las encías y caries dental. La caries dental puede conducir a la formación de un agujero en el diente. · Ayuda a prevenir el mal aliento. Cepillarse los dientes y usar hilo dental eliminan de la boca las bacterias que causan el mal [place]. · Ayuda a mantener los dientes blancos previniendo las [facility] Floor a los [place]str[place] u [place], las bebidas y el tabaco.  · Hace posible que los dientes ciara toda la bell. ¿Qué puede hacer para prevenir los problemas dentales? · Cepíllese los [place] al día, por la mañana y por la noche. ¨ Utilice un cepillo de dientes con cerdas suaves y redondeadas y [employer] rxoie que sea lo suficientemente pequeña mohan para llegar a todas las partes de los dientes y la boca. ¨ Reemplace foster cepillo de dientes cada 3 o 4 meses. ¨ Utilice scott pasta dental con flúor. ¨ Coloque el cepillo en un ángulo de 39 grados sobre el lugar donde los dientes se unen a las encías. Pres[place] con firmeza y [place] suavemente el cepillo hacia atrás y hacia adelante con pequeños movimientos circulares.   [address] superficies de masticación con trazos cortos hacia adelante y [place] atrás. ¨ Cepíllese la lengua de atrás [employer]. · Pásese el hilo dental al menos scott vez al día. Elija el tipo y el sabor que más le guste. · Programe controles y limpiezas con la frecuencia que le recomiende foster dentista. · Siga scott dieta saludable para ayudar a mantener las encías sanas y los dientes isatu. Opte por alimentos que amada buenos para jerilyn dientes, mohan granos integrales, verduras, frutas así mohan alimentos que tengan un bajo contenido de grasas saturadas y Marie. El [facility] [address] y otros quesos, la mantequilla de cacahuate [place]), el yogur y Mat Miles son buenos para los dientes. Los chicles sin azúcar (especialmente los que tienen [place]) también son [facility] buena opción. · Evite los alimentos que contienen mucha azúcar, especialmente los alimentos dulces y pegajosos mohan los caramelos blandos. · No coma refrigerios antes de acostarse. La comida que [employer] dientes tiene más probabilidad de causar caries fatemeh la noche. · No fume ni consuma productos de tabaco sin humo. El tabaco puede empeorar las caries dentales. Si necesita ayuda para dejar de fumar, hable con foster médico sobre programas y medicamentos para dejar de fumar. Estos pueden aumentar jerilyn probabilidades de dejar el hábito para siempre. ¿Dónde puede encontrar más información en inglés? Michael Malcolm a http://damion-david.info/. Kentrell Danielsa R417 en la búsqueda para aprender más acerca de \"Aprenda sobre el cuidado dental - [ Valeen Fairly ]. \"  Revisado: 9 josé, 2016  Versión del contenido: 11.3  © 5014-0960 [employer]. Las instrucciones de cuidado fueron adaptadas bajo licencia por Good Help Connections (which disclaims liability or warranty for this information). Si usted tiene [place] afección médica o sobre estas instrucciones, siempre pregunte a foster profesional de frederic.  [employer] niega toda garantía o responsabilidad por foster uso de esta información.

## 2017-09-29 ENCOUNTER — TELEPHONE (OUTPATIENT)
Dept: FAMILY MEDICINE CLINIC | Age: 37
End: 2017-09-29

## 2017-09-29 NOTE — TELEPHONE ENCOUNTER
Avel Smith spoke to Shawn Morrison and scheduled AN screening appointment on 10/17/17 @ 3:00 pm.    Renita Daniel

## 2017-10-17 ENCOUNTER — OFFICE VISIT (OUTPATIENT)
Dept: FAMILY MEDICINE CLINIC | Age: 37
End: 2017-10-17

## 2017-10-17 DIAGNOSIS — Z71.89 COUNSELING AND COORDINATION OF CARE: Primary | ICD-10-CM

## 2017-10-24 ENCOUNTER — TELEPHONE (OUTPATIENT)
Dept: FAMILY MEDICINE CLINIC | Age: 37
End: 2017-10-24

## 2017-10-24 NOTE — TELEPHONE ENCOUNTER
Spoke to the patient via [employer]  #[id number]. Pt was told that she has to have the wrist/hand x-rays done before Access Now referral can be submitted. Explained that she needs to go to [facility] to have xray done. [facility] will go [facility]. She is concerned about the bills, suggested she ask to speak to a financial counselor after she has her xray done. Explained that she should not call AN to set up an appointment. After I have the xray results I will submit the referral and will call her to let her know when she can call the AN appointment line.

## 2017-10-26 ENCOUNTER — HOSPITAL ENCOUNTER (OUTPATIENT)
Dept: GENERAL RADIOLOGY | Age: 37
Discharge: HOME | End: 2017-10-26
Payer: SUBSIDIZED

## 2017-10-26 DIAGNOSIS — M79.641 CHRONIC HAND PAIN, RIGHT: ICD-10-CM

## 2017-10-26 DIAGNOSIS — G89.29 CHRONIC HAND PAIN, RIGHT: ICD-10-CM

## 2017-10-26 PROCEDURE — 73110 X-RAY EXAM OF WRIST: CPT

## 2017-10-26 PROCEDURE — 73130 X-RAY EXAM OF HAND: CPT

## 2017-12-08 ENCOUNTER — HOSPITAL ENCOUNTER (OUTPATIENT)
Dept: LAB | Age: 37
Discharge: HOME | End: 2017-12-08

## 2017-12-08 ENCOUNTER — OFFICE VISIT (OUTPATIENT)
Dept: FAMILY MEDICINE CLINIC | Age: 37
End: 2017-12-08

## 2017-12-08 VITALS
TEMPERATURE: 98.2 F | WEIGHT: 202 LBS | DIASTOLIC BLOOD PRESSURE: 69 MMHG | BODY MASS INDEX: 39.66 KG/M2 | HEIGHT: 60 IN | HEART RATE: 77 BPM | SYSTOLIC BLOOD PRESSURE: 118 MMHG

## 2017-12-08 DIAGNOSIS — F41.9 ANXIETY: Primary | ICD-10-CM

## 2017-12-08 DIAGNOSIS — F41.9 ANXIETY: ICD-10-CM

## 2017-12-08 DIAGNOSIS — R07.89 CHEST DISCOMFORT: ICD-10-CM

## 2017-12-08 DIAGNOSIS — E11.65 TYPE 2 DIABETES MELLITUS WITH HYPERGLYCEMIA, WITHOUT LONG-TERM CURRENT USE OF INSULIN (HCC): ICD-10-CM

## 2017-12-08 DIAGNOSIS — N92.0 MENORRHAGIA WITH REGULAR CYCLE: ICD-10-CM

## 2017-12-08 LAB
ALBUMIN SERPL-MCNC: 3.7 G/DL (ref 3.5–5)
ALBUMIN/GLOB SERPL: 1 {RATIO} (ref 1.1–2.2)
ALP SERPL-CCNC: 105 U/L (ref 45–117)
ALT SERPL-CCNC: 40 U/L (ref 12–78)
ANION GAP SERPL CALC-SCNC: 7 MMOL/L (ref 5–15)
AST SERPL-CCNC: 27 U/L (ref 15–37)
BILIRUB SERPL-MCNC: 0.5 MG/DL (ref 0.2–1)
BUN SERPL-MCNC: 17 MG/DL (ref 6–20)
BUN/CREAT SERPL: 23 (ref 12–20)
CALCIUM SERPL-MCNC: 8.8 MG/DL (ref 8.5–10.1)
CHLORIDE SERPL-SCNC: 100 MMOL/L (ref 97–108)
CO2 SERPL-SCNC: 28 MMOL/L (ref 21–32)
CREAT SERPL-MCNC: 0.74 MG/DL (ref 0.55–1.02)
ERYTHROCYTE [DISTWIDTH] IN BLOOD BY AUTOMATED COUNT: 12.8 % (ref 11.5–14.5)
EST. AVERAGE GLUCOSE BLD GHB EST-MCNC: 200 MG/DL
GLOBULIN SER CALC-MCNC: 3.6 G/DL (ref 2–4)
GLUCOSE SERPL-MCNC: 193 MG/DL (ref 65–100)
HBA1C MFR BLD: 8.6 % (ref 4.2–6.3)
HCT VFR BLD AUTO: 40.3 % (ref 35–47)
HGB BLD-MCNC: 13.7 G/DL (ref 11.5–16)
MCH RBC QN AUTO: 30 PG (ref 26–34)
MCHC RBC AUTO-ENTMCNC: 34 G/DL (ref 30–36.5)
MCV RBC AUTO: 88.4 FL (ref 80–99)
PLATELET # BLD AUTO: 230 K/UL (ref 150–400)
POTASSIUM SERPL-SCNC: 3.9 MMOL/L (ref 3.5–5.1)
PROT SERPL-MCNC: 7.3 G/DL (ref 6.4–8.2)
RBC # BLD AUTO: 4.56 M/UL (ref 3.8–5.2)
SODIUM SERPL-SCNC: 135 MMOL/L (ref 136–145)
TSH SERPL DL<=0.05 MIU/L-ACNC: 2.77 UIU/ML (ref 0.36–3.74)
WBC # BLD AUTO: 9.9 K/UL (ref 3.6–11)

## 2017-12-08 PROCEDURE — 83036 HEMOGLOBIN GLYCOSYLATED A1C: CPT | Performed by: FAMILY MEDICINE

## 2017-12-08 PROCEDURE — 80053 COMPREHEN METABOLIC PANEL: CPT | Performed by: FAMILY MEDICINE

## 2017-12-08 PROCEDURE — 84443 ASSAY THYROID STIM HORMONE: CPT | Performed by: FAMILY MEDICINE

## 2017-12-08 PROCEDURE — 85027 COMPLETE CBC AUTOMATED: CPT | Performed by: FAMILY MEDICINE

## 2017-12-08 RX ORDER — CITALOPRAM 20 MG/1
TABLET, FILM COATED ORAL
Qty: 30 TAB | Refills: 2 | Status: SHIPPED | OUTPATIENT
Start: 2017-12-08 | End: 2018-02-26

## 2017-12-08 NOTE — MR AVS SNAPSHOT
Visit Information Mary Kay Hernandez Personal Médico Departamento Teléfono del Dep. Número de visita 12/8/2017 11:30 AM Pancho FloresSylvia [phone] Follow-up Instructions Return in about 3 months (around 3/8/2018), or if symptoms worsen or fail to improve. Upcoming Health Maintenance Date Due  
 LIPID PANEL Q1 1980 FOOT EXAM Q1 5/27/1989 MICROALBUMIN Q1 5/27/1989 Pneumococcal 19-64 Medium Risk (1 of 1 - PPSV23) 5/27/1998 Influenza Age 5 to Adult 8/1/2017 HEMOGLOBIN A1C Q6M 2/7/2018 EYE EXAM RETINAL OR DILATED Q1 10/26/2018 PAP AKA CERVICAL CYTOLOGY 6/7/2019 DTaP/Tdap/Td series (2 - Td) 3/25/2027 Alergias  Review Complete El: 12/8/2017 Por: Pancho Flores MD  
 A partir del:  12/8/2017 No Known Allergies Vacunas actuales Yessica Donaldsonn Gal Tdap 3/25/2017  3:58 AM  
  
 No revisadas esta visita You Were Diagnosed With   
  
 [place]esa [place] Anxiety    -  Primary ICD-10-CM: F41.9 ICD-9-CM: 300.00 Chest discomfort     ICD-10-CM: R07.89 ICD-9-CM: 786.59 Type 2 diabetes mellitus with hyperglycemia, without long-term current use of insulin (HCC)     ICD-10-CM: E11.65 ICD-9-CM: 250.00, 790.29 Menorrhagia with regular cycle     ICD-10-CM: N92.0 ICD-9-CM: 626.2 Partes vitales PS Pulso Temperatura [place] ( percentil de crecimiento) Peso (percentil de crecimiento) LMP (última anai)  
 118/69 (BP 1 Location: Right arm) 77 98.2 °F (36.8 °C) (Oral) 5' (1.524 m) 202 lb (91.6 kg) 11/13/2017 BMI [facility]) Estado obstétrico Estatus de tabaquísmo 39.45 kg/m2 Having regular periods Never Smoker Historial de signos vitales BMI and BSA Data Body Mass Index Body Surface Area  
 39.45 kg/m 2 1.97 m 2 Jayla Aranda [facility]acy Name Phone [facility] [address] [place] [address] [phone] Harris lista de medicamentos actualizada Lista actualizada el: 12/8/17 12:32 PM.  Jordan Bal use harris lista de medicamentos más reciente. citalopram 20 mg tablet También conocido mohan:  Temkarl Churchill Take 1/2 tab by mouth daily x 1 week and then increase to 1 tab daily. Please provide Macedonian instructions  
  
 metFORMIN  mg tablet También conocido mohan:  GLUCOPHAGE XR Take 2 Tabs by mouth daily (with dinner). For diabetes Maori sig  
  
 naproxen 500 mg tablet También conocido mohan:  NAPROSYN Take 1 Tab by mouth two (2) times daily (with meals). Recetas Enviado a la [place] Refills  
 citalopram (CELEXA) 20 mg tablet 2 Sig: Take 1/2 tab by mouth daily x 1 week and then increase to 1 tab daily. Please provide Macedonian instructions Class: Normal  
 Pharmacy: [address] #: [phone] Hicimos lo siguiente AMB POC EKG ROUTINE W/ 12 LEADS, INTER & REP [84476 CPT(R)] Instrucciones de seguimiento Return in about 3 months (around 3/8/2018), or if symptoms worsen or fail to improve. Introducing [facility]! [facility] introduce portal paciente MyChart . Ahora se puede acceder a partes de harris expediente médico, enviar por correo electrónico la oficina de ahrris médico y solicitar renovaciones de medicamentos en línea. En harris navegador de Internet , Kirsty Quach a [email or web address] Alison joseph en el usuario por Jacqui Snider? Millie ruiz aquí en la sesión Thresa Rias. Verá la página de registro [place]. Ingrese harris código de Bank  Jaylin michael y mohan aparece a continuación. Usted no tendrá que [employer] código después de deandra completado el proceso de registro . Si usted no se inscribe antes de la fecha de caducidad , debe solicitar un nuevo código. · MyChart Código de acceso : [id number] Expires: 2/3/2018 12:30 PM 
 
 Ingresa los últimos cuatro dígitos de foster Número de Seguro Social ( xxxx ) y fecha de nacimiento ( dd / mm / aaaa ) mohan se indica y alison clic en Enviar. Usted será llevado a la siguiente página de registro . Crear un ID MyChart . Esta será foster ID de inicio de sesión de MyChart y no puede ser [place] , por lo que pensar en scott que es Hyacinth Chireno y fácil de recordar . Crear scott contraseña MyChart . Usted puede cambiar foster contraseña en cualquier momento . Ingrese foster Password Reset de preguntas y Ascencio . [place] se puede utilizar en un momento posterior si usted olvida foster contraseña. Introduzca foster dirección de correo electrónico . Yaneth Jayy recibirá scott notificación por correo electrónico cuando la nueva información está disponible en MyChart . Verlinda Rumpf clic en Registrarse. [place] Homme becki y descargar porciones de foster expediente médico. 
Alison clic en el enlace de descarga del menú Resumen para descargar scott copia portátil de foster información médica . Si tiene [employer] , por favor visite la sección de preguntas frecuentes del sitio web MyChart . Recuerde, MyChart NO es que se utilizará para las necesidades urgentes. Para emergencias médicas , llame al 911 . Ahora disponible en foster iPhone y Android ! Por favor proporcione raul resumen de la documentación de cuidado a foster próximo proveedor. Your primary care clinician is listed as NONE. If you have any questions after today's visit, please call [phone].

## 2017-12-08 NOTE — PROGRESS NOTES
Subjective:     Chief Complaint   Patient presents with    Tingling     pt c/o numbness on left arm and anxiety and nervousness x6 days      She  is a 44 y.o. female who presents for evaluation of:  Anxiety - sx for about 2 yrs. Started after her ex- was unfaithful to her. She sees it flare up occ when getting in fights with her daughters. She also gets upset since her son lives with her ex and she often does not have enough money to pick him up. She worries that her son feels that she does not love him enough to get him from her ex. Her sx have been flaring up for the past week. Also, at the end of our visit, she reports having heavy periods for the past 2 cycles that lasted nearly 15 days. She says she had something placed in her vagina in 2013 to prevent pregnancy but is unclear if this was an IUD. She was not ever told to f/u on this per her report. She has been having some significant RLQ pain recently as well. ROS  Gen - no fever/chills  Resp - no dyspnea or cough  CV - no chest pain or DOW  Rest per HPI    Past Medical History:   Diagnosis Date    Diabetes ([facility] [address]     IUD (intrauterine device) since 2013      Past Surgical History:   Procedure Laterality Date    HX APPENDECTOMY       Current Outpatient Prescriptions on File Prior to Visit   Medication Sig Dispense Refill    naproxen (NAPROSYN) 500 mg tablet Take 1 Tab by mouth two (2) times daily (with meals). 40 Tab 1    metFORMIN ER (GLUCOPHAGE XR) 500 mg tablet Take 2 Tabs by mouth daily (with dinner). For diabetes Japanese sig 180 Tab 1     No current facility-administered medications on file prior to visit.          Objective:     Vitals:    12/08/17 1133   BP: 118/69   Pulse: 77   Temp: 98.2 °F (36.8 °C)   TempSrc: Oral   Weight: 202 lb (91.6 kg)   Height: 5' (1.524 m)     Physical Examination:  General appearance - alert, well appearing, and in no distress  Eyes -sclera anicteric  Neck - supple, no significant adenopathy, no thyromegaly, no bruits  Chest - clear to auscultation, no wheezes, rales or rhonchi, symmetric air entry  Heart - normal rate, regular rhythm, normal S1, S2, no murmurs, rubs, clicks or gallops  Extr - no edema  Psych - nml mood and affect    Assessment/ Plan:   Diagnoses and all orders for this visit:    1. Anxiety - sx most c/w anxiety. Will start her on Celexa. -     AMB POC EKG ROUTINE W/ 12 LEADS, INTER & REP  -     CBC W/O DIFF; Future  -     citalopram (CELEXA) 20 mg tablet; Take 1/2 tab by mouth daily x 1 week and then increase to 1 tab daily. Please provide Upper sorbian instructions    2. Chest discomfort - d/t # 1. EKG nml.  -     AMB POC EKG ROUTINE W/ 12 LEADS, INTER & REP    3. Type 2 diabetes mellitus with hyperglycemia, without long-term current use of insulin (HCC) - getting labs since not checked recently. Plan for lipids in future when fasting.  -     CBC W/O DIFF; Future  -     METABOLIC PANEL, COMPREHENSIVE; Future  -     HEMOGLOBIN A1C WITH EAG; Future  -     TSH 3RD GENERATION; Future    4. Menorrhagia with regular cycle - labs. Discussed f/u in the next few weeks with Kenneth Estrella to be able to remove suspected IUD and place a new one - if copper IUD, would be viable for 10 yrs but with abd pain may be better to remove this either way. We can ct w/u for change in menses after this. -     CBC W/O DIFF; Future    I have discussed the diagnosis with the patient and the intended plan as seen in the above orders. The patient has received an after-visit summary and questions were answered concerning future plans. I have discussed medication side effects and warnings with the patient as well. The patient verbalizes understanding and agreement with the plan. Follow-up Disposition:  Return in about 4 weeks (around 1/5/2018), or if symptoms worsen or fail to improve.

## 2017-12-08 NOTE — PROGRESS NOTES
Coordination of Care  1. Have you been to the ER, urgent care clinic since your last visit? Hospitalized since your last visit? No    2. Have you seen or consulted any other health care providers outside of the [address] [place] [address] since your last visit? Include any pap smears or colon screening. No    Medications  Does the patient need refills? YES    Learning Assessment Complete?  yes

## 2017-12-12 NOTE — PROGRESS NOTES
Please call patient diabetes control looks worse. I would like her to increase her Metformin from 2 pills a day to 3 pills a day with dinner. Needs to continue working on regular exercise and diet. She also needs a follow-up appointment in 4 weeks after we started her on Celexa to ensure she is tolerating it well with no side effects and decide on increasing the dose further if needed.   Thanks

## 2017-12-22 NOTE — PROGRESS NOTES
I called pt today with [employer]  # [phone] and left a generic message asking the pt to call back to speak with a nurse to get a message from the provider.  Caren Nichols RN

## 2018-01-02 ENCOUNTER — OFFICE VISIT (OUTPATIENT)
Dept: FAMILY MEDICINE CLINIC | Age: 38
End: 2018-01-02

## 2018-01-02 VITALS
HEART RATE: 100 BPM | RESPIRATION RATE: 16 BRPM | TEMPERATURE: 98.5 F | BODY MASS INDEX: 39.85 KG/M2 | HEIGHT: 60 IN | DIASTOLIC BLOOD PRESSURE: 73 MMHG | OXYGEN SATURATION: 96 % | WEIGHT: 203 LBS | SYSTOLIC BLOOD PRESSURE: 105 MMHG

## 2018-01-02 DIAGNOSIS — N93.8 DUB (DYSFUNCTIONAL UTERINE BLEEDING): Primary | ICD-10-CM

## 2018-01-02 DIAGNOSIS — N85.2 ENLARGED UTERUS: ICD-10-CM

## 2018-01-02 LAB — HGB BLD-MCNC: 13.9 G/DL

## 2018-01-02 NOTE — PATIENT INSTRUCTIONS
Sangrado uterino anormal: Instrucciones de cuidado - [ Abnormal Uterine Bleeding: Care Instructions ]  Instrucciones de cuidado    El sangrado uterino anormal (AUB, por jerilyn siglas en inglés) es un sangrado irregular del útero que dura más o es más intenso de lo normal o que no ocurre en el momento habitual para usted. A veces, se debe a cambios en los niveles hormonales. También puede estar causado por crecimientos en el útero, tales mohan fibromas o pólipos. A veces no se puede encontrar la causa. Podría tener mucho sangrado cuando no está esperando foster período. Foster médico puede sugerirle scott prueba de embarazo si savi que está Loreli Budds. La atención de seguimiento es scott parte clave de foster tratamiento y seguridad. Asegúrese de hacer y acudir a todas las citas, y llame a foster médico si está teniendo problemas. También es scott buena idea saber los resultados de los exámenes y mantener scott lista de los medicamentos que jessica. ¿Cómo puede cuidarse en el hogar? · Sea liza con los medicamentos. [place] los analgésicos (medicamentos para el dolor) exactamente mohan le fueron indicados. ¨ Si el médico le recetó un analgésico, tómelo según las indicaciones. ¨ Si no está tomando un analgésico recetado, pregúntele a foster médico si puede suhail jonelle de [place]. · Podría faltarle nilesh por la pérdida de mae. Coma scott dieta equilibrada con alto contenido de nilesh y vitamina C. Entre los alimentos ricos en nilesh se encuentran las yasir moody, los [place], los [place], los frijoles (habichuelas) y las verduras de hojas verdes. Pregúntele a foster médico si necesita suhail pastillas de nilesh o un multivitamínico.  ¿Cuándo debe pedir ayuda? Llame al 911 en cualquier momento que considere que necesita atención de [place]. Por ejemplo, llame si:  ? · Se desmayó (perdió el conocimiento). ?Llame a foster médico ahora mismo o busque atención médica inmediata si:  ? · Tiene dolor repentino e intenso en el abdomen o la pelvis.    ? · Tiene sangrado vaginal intenso. Empapa jerilyn toallas sanitarias o tampones habituales cada hora fatemeh 2 o más horas. ? · [employer] o aturdimiento, o que está a punto de [place]. ?Preste especial atención a los cambios en foster frederic y asegúrese de comunicarse con foster médico si:  ? · Tiene un dolor nuevo en el abdomen o la pelvis. ? · Tiene fiebre. ? · El sangrado empeora o dura más de 1 semana. ? · Piensa que podría estar embarazada. ¿Dónde puede encontrar más información en inglés? Brice Wen a http://damion-david.info/. Beatriz Drop [id number] en la búsqueda para aprender más acerca de \"Sangrado uterino anormal: Instrucciones de cuidado - [ Abnormal Uterine Bleeding: Care Instructions ]. \"  Revisado: 13 octubre, 2016  Versión del contenido: 11.4  © 2840-5428 Healthwise, Incorporated. Las instrucciones de cuidado fueron adaptadas bajo licencia por [employer] (which disclaims liability or warranty for this information). Si usted tiene [place] afección médica o sobre estas instrucciones, siempre pregunte a foster profesional de frederic. Healthwise, Incorporated niega toda garantía o responsabilidad por foster uso de esta información.

## 2018-01-02 NOTE — PROGRESS NOTES
Chief Complaint   Patient presents with    Irregular Menses     some times 10 days with pain         Visit Vitals    /73 (BP 1 Location: Right arm, BP Patient Position: Sitting)    Pulse 100    Temp 98.5 °F (36.9 °C) (Oral)    Resp 16    Ht 5' (1.524 m)    Wt 203 lb (92.1 kg)    SpO2 96%    BMI 39.65 kg/m2     1. Have you been to the ER, urgent care clinic since your last visit? Hospitalized since your last visit? No    2. Have you seen or consulted any other health care providers outside of the [address] [place] [address] since your last visit? Include any pap smears or colon screening.  No

## 2018-01-02 NOTE — MR AVS SNAPSHOT
Visit Information Karen Gay Personal Médico Departamento Teléfono del Dep. Número de visita 1/2/2018 10:10 AM Shelli Copp[address], [address] [phone] Follow-up Instructions Return in about 4 weeks (around 1/30/2018). Your Appointments 1/5/2018  8:50 AM  
ROUTINE CARE with Vesna Coats MD  
[facility] Appt Note: FU  
 [address]  
  
   
 [address] [facility] Upcoming Health Maintenance Date Due  
 LIPID PANEL Q1 1979 FOOT EXAM Q1 5/27/1989 MICROALBUMIN Q1 5/27/1989 Pneumococcal 19-64 Medium Risk (1 of 1 - PPSV23) 5/27/1998 Influenza Age 5 to Adult 8/1/2017 HEMOGLOBIN A1C Q6M 6/8/2018 EYE EXAM RETINAL OR DILATED Q1 10/26/2018 PAP AKA CERVICAL CYTOLOGY 6/7/2019 DTaP/Tdap/Td series (2 - Td) 3/25/2027 Alerg[place]  Review Complete El: 1/2/2018 Por: Elex Schwab, LPN A partir del:  1/2/2018 No Known Allergies Vacunas actuales Revisadas el:  1/2/2018 Sharri Isbell Tdap 3/25/2017  3:58 AM  
  
 Revisadas por:  Elex Schwab, LPN  EOEDMUFSE el:  6/9/6107 10:18 AM  
  
You Were Diagnosed With   
  
 Anastasia Portillo DUB (dysfunctional uterine bleeding)    -  Primary ICD-10-CM: N93.8 ICD-9-CM: 626.8 Enlarged uterus     ICD-10-CM: N85.2 ICD-9-CM: 621.2 Partes vitales PS Pulso Temperatura Resp [place] ( percentil de crecimiento) Peso (percentil de crecimiento) 105/73 (BP 1 Location: Right arm, BP Patient Position: Sitting) 100 98.5 °F (36.9 °C) (Oral) 16 5' (1.524 m) 203 lb (92.1 kg) LMP (última anai) SpO2 BMI (IMC) Estado obstétrico Estatus de tabaquísmo 12/16/2017 96% 39.65 kg/m2 Having regular periods Never Smoker Historial de signos vitales BMI and BSA Data  Body Mass Index Body Surface Area  
 39.65 kg/m 2 1.97 m 2  
  
  
 [facility] Name Phone [address] [facility] [phone] Foster lista de medicamentos actualizada Lista actualizada el: 1/2/18 10:36 AM.  E[place]esteen Pert use foster lista de medicamentos más reciente. citalopram 20 mg tablet También conocido mohan:  Mack Barer Take 1/2 tab by mouth daily x 1 week and then increase to 1 tab daily. Please provide Khmer instructions  
  
 metFORMIN  mg tablet También conocido mohan:  GLUCOPHAGE XR Take 2 Tabs by mouth daily (with dinner). For diabetes American sig  
  
 naproxen 500 mg tablet También conocido mohan:  NAPROSYN Take 1 Tab by mouth two (2) times daily (with meals). Instrucciones de seguimiento Return in about 4 weeks (around 1/30/2018). Por hacer 01/02/2018 Imaging:  US PELV NON OBS   
  
 01/02/2018 Imaging:  US TRANSVAGINAL Instrucciones para el Paciente Sangrado uterino anormal: Instrucciones de cuidado - [ Abnormal Uterine Bleeding: Care Instructions ] Instrucciones de cuidado El sangrado uterino anormal (AUB, por jerilyn siglas en inglés) es un sangrado irregular del útero que dura más o es más intenso de lo normal o que no ocurre en el momento habitual para usted. A veces, se debe a cambios en los niveles hormonales. También puede estar causado por crecimientos en el útero, tales mohan fibromas o pólipos. A veces no se puede encontrar la causa. Podría tener mucho sangrado cuando no está esperando foster período. Foster médico puede sugerirle scott prueba de embarazo si savi que está Ardith Floyd. La atención de seguimiento es scott parte clave de foster tratamiento y seguridad. Asegúrese de hacer y acudir a todas las citas, y llame a foster médico si está teniendo problemas. También es scott buena idea saber los resultados de los exámenes y mantener scott lista de los medicamentos que jessica. Cómo puede cuidarse en el hogar? · Sea liza con los medicamentos. [place] los analgésicos (medicamentos para el dolor) exactamente mohan le fueron indicados. ¨ Si el médico le recetó un analgésico, tómelo según las indicaciones. ¨ Si no está tomando un analgésico recetado, pregúntele a foster médico si puede suhail jonelle de [place]. · Podría faltarle nilesh por la pérdida de mae. Coma scott dieta equilibrada con alto contenido de nilesh y vitamina C. Entre los alimentos ricos en nilesh se encuentran las yasir moody, los [place], los [place], los frijoles (habichuelas) y las verduras de hojas verdes. Pregúntele a foster médico si necesita suhail pastillas de nilesh o un multivitamínico. 

Cuándo debe pedir ayuda? Llame al 911 en cualquier momento que considere que necesita atención de [place]. Por ejemplo, llame si: 
? · Se desmayó (perdió el conocimiento). ?Llame a foster médico ahora mismo o busque atención médica inmediata si: 
? · Tiene dolor repentino e intenso en el abdomen o la pelvis. ? · Tiene sangrado vaginal intenso. Empapa jerilyn toallas sanitarias o tampones habituales cada hora fatemeh 2 o más horas. ? · [employer] o aturdimiento, o que está a punto de [place]. ?Preste especial atención a los cambios en foster frederic y asegúrese de comunicarse con foster médico si: 
? · Tiene un dolor nuevo en el abdomen o la pelvis. ? · Tiene fiebre. ? · El sangrado empeora o dura más de 1 semana. ? · Piensa que podría estar embarazada. Dónde puede encontrar más información en inglés? Jessica Salazar a http://anjum.info/. Byron  [id number] en la búsqueda para aprender más acerca de \"Sangrado uterino anormal: Instrucciones de cuidado - [ Abnormal Uterine Bleeding: Care Instructions ]. \" 
Revisado: 13 octubre, 2016 Versión del contenido: 11.4 © 7692-8457 Healthwise, [employer]Oceans.  Las instrucciones de cuidado fueron adaptadas bajo licencia por Good Help Connections (which disclaims liability or warranty for this information). Si usted tiene [place] afección médica o sobre estas instrucciones, siempre pregunte a fsoter profesional de frederic. HealthPerr[employer], Incorporated niega toda garantía o responsabilidad por foster uso de esta información. Introducing [facility] SERVICES! [facility] introduce portal paciente MyChart . Ahora se puede acceder a partes de foster expediente médico, enviar por correo electrónico la oficina de foster médico y solicitar renovaciones de medicamentos en línea. En foster navegador de Internet , Milton Schuster a [email or web address] Cuca clic en el usuario por Lamont Castillo? Law Chute clic aquí en la sesión Esdras Stagger. Verá la página de registro [place]. Ingrese foster código de Bank  Jaylin michael y mohan aparece a continuación. Usted no tendrá que [employer] código después de deandra completado el proceso de registro . Si usted no se inscribe antes de la fecha de caducidad , debe solicitar un nuevo código. · MyChart Código de acceso : [id number] Expires: 2/3/2018 12:30 PM 
 
Ingresa los últimos cuatro dígitos de foster Número de Seguro Social ( xxxx ) y fecha de nacimiento ( dd / mm / aaaa ) mohan se indica y cuca clic en Enviar. Kiet será llevado a la siguiente página de registro . Crear un ID MyChart . Esta será foster ID de inicio de sesión de MyChart y no puede ser [place] , por lo que pensar en scott que es [place]alee [place] y fácil de recordar . Crear scott contraseña MyChart . Kiet puede cambiar foster contraseña en cualquier momento . Ingrese foster Password Reset de preguntas y [place] . [place] se puede utilizar en un momento posterior si usted olvida foster contraseña. Introduzca foster dirección de correo electrónico . David Agusto recibirá scott notificación por correo electrónico cuando la nueva información está disponible en MyChart . Josias Girolandtti clmaryellen en Registrarse.  Laureen  becki y descargar porciones de foster expediente médico. 
 Alison joseph en el enlace de descarga del menú Resumen para descargar scott copia portátil de foster información médica . Si tiene [employer] , por favor visite la sección de preguntas frecuentes del sitio web MyChart . Recuerde, MyChart NO es que se utilizará para las necesidades urgentes. Para emergencias médicas , llame al 911 . Ahora disponible en foster iPhone y Android ! Por favor proporcione raul resumen de la documentación de cuidado a foster próximo proveedor. Your primary care clinician is listed as NONE. If you have any questions after today's visit, please call [phone].

## 2018-01-02 NOTE — PROGRESS NOTES
Assessment/Plan:    Diagnoses and all orders for this visit:    1. DUB (dysfunctional uterine bleeding)  -     US PELV NON OBS; Future  -     US TRANSVAGINAL; Future  -     AMB POC HEMOGLOBIN (HGB)    2. Enlarged uterus  -     US PELV NON OBS; Future  -     US TRANSVAGINAL; Future    Pt now thinks that she may have had a procedure similar to a BTL to prevent future pregnancy? She thinks that the IUD was removed before- and again on exam there are no strings visible  In the past 3-6 months, she has developed an irregular period- sometimes periods last 2 weeks and frequency of more than one period a month. On exam, her uterus feels to be 10 cm (out of pelvis). Get ultrasound. Her hemoglobin in normal at 13.8 so will hold on meds while waiting for the ultrasound report      Suspect that she has PCOS but that might not explain her new sxs as she has probably had PCOS for years. On metformin for DM already. Follow-up Disposition:  Return in about 4 weeks (around 1/30/2018). ROGERS Spears expressed understanding of this plan. An AVS was printed and given to the patient.      ----------------------------------------------------------------------    Chief Complaint   Patient presents with    Irregular Menses     some times 10 days with pain       History of Present Illness:  Pt here for a change in her period over the past \"3-6 months\". Periods last up to 2 weeks at times but bleeding is not described as heavy. I asked if she ever found the name of her IUD (copper T vs mirena) and she tells me that she remembers having it removed and there was something done with her \"tubes that would make me not be able to have children\". She states it was not a BTL as best as I could describe it. Again, there is no possibility to get records because she can't remember where she had this done in [place]    She is not having heavy periods or pain with intercourse. She is back with her .  She states that this is a good thing that they are back together. Past Medical History:   Diagnosis Date    Diabetes ([address]     IUD (intrauterine device) since 2013        Current Outpatient Prescriptions   Medication Sig Dispense Refill    citalopram (CELEXA) 20 mg tablet Take 1/2 tab by mouth daily x 1 week and then increase to 1 tab daily. Please provide Welsh instructions 30 Tab 2    naproxen (NAPROSYN) 500 mg tablet Take 1 Tab by mouth two (2) times daily (with meals). 40 Tab 1    metFORMIN ER (GLUCOPHAGE XR) 500 mg tablet Take 2 Tabs by mouth daily (with dinner). For diabetes Ukrainian sig 180 Tab 1       No Known Allergies    Social History   Substance Use Topics    Smoking status: Never Smoker    Smokeless tobacco: Never Used    Alcohol use No       History reviewed. No pertinent family history. Physical Exam:     Visit Vitals    /73 (BP 1 Location: Right arm, BP Patient Position: Sitting)    Pulse 100    Temp 98.5 °F (36.9 °C) (Oral)    Resp 16    Ht 5' (1.524 m)    Wt 203 lb (92.1 kg)    LMP 12/16/2017    SpO2 96%    BMI 39.65 kg/m2       A&Ox3  WDWN NAD  Respirations normal and non labored  Pelvic exam- ext neg for lesion or discharge. Vagina and cervix are free of discharge or lesions. There are no visible IUD strings. Her uterine fundus is out of pelvis.  She is tender bernadette over ovaries

## 2018-01-16 ENCOUNTER — OFFICE VISIT (OUTPATIENT)
Dept: FAMILY MEDICINE CLINIC | Age: 38
End: 2018-01-16

## 2018-01-16 VITALS
HEART RATE: 78 BPM | WEIGHT: 201 LBS | OXYGEN SATURATION: 97 % | TEMPERATURE: 97.8 F | HEIGHT: 60 IN | SYSTOLIC BLOOD PRESSURE: 156 MMHG | DIASTOLIC BLOOD PRESSURE: 84 MMHG | BODY MASS INDEX: 39.46 KG/M2 | RESPIRATION RATE: 16 BRPM

## 2018-01-16 DIAGNOSIS — N93.8 DUB (DYSFUNCTIONAL UTERINE BLEEDING): Primary | ICD-10-CM

## 2018-01-16 NOTE — PROGRESS NOTES
Chief Complaint   Patient presents with    Irregular Menses     Patient is here for a follow up visit for irregular mensus cycles. 1. Have you been to the ER, urgent care clinic since your last visit? Hospitalized since your last visit? No     2. Have you seen or consulted any other health care providers outside of the [address] [place] [address] since your last visit? Include any pap smears or colon screening.   No       Visit Vitals    /84 (BP 1 Location: Left arm, BP Patient Position: Sitting)    Pulse 78    Temp 97.8 °F (36.6 °C) (Oral)    Resp 16    Ht 5' (1.524 m)    Wt 201 lb (91.2 kg)    SpO2 97%    BMI 39.26 kg/m2

## 2018-01-16 NOTE — PROGRESS NOTES
Patient given central scheduling phone # to change her us date. Also patient reports name of \"IUD\" is called \"The Essure\".

## 2018-01-16 NOTE — PROGRESS NOTES
Pt here to see if we can reschedule her upcoming pelvic ultrasounds to this week because she is off from work (they are currently scheduled for 1/29/18). We are going to try to reschedule.  She should f/up with me 2 weeks after the ultrasounds

## 2018-01-16 NOTE — MR AVS SNAPSHOT
[address] [place] [address] [facility] [id number] 
[phone] Patient: Saranya Arana MRN: [id number] :1980 Visit Information Roxy Valles Personal Médico Departamento Teléfono del Dep. Número de visita 2018  1:15 PM Kirti Coppola [address], [address] [facility] [phone] Follow-up Instructions Return in about 3 weeks (around 2018). Upcoming Health Maintenance Date Due  
 LIPID PANEL Q1 1980 FOOT EXAM Q1 1989 MICROALBUMIN Q1 1989 Pneumococcal 19-64 Medium Risk (1 of 1 - PPSV23) 1998 Influenza Age 5 to Adult 2017 HEMOGLOBIN A1C Q6M 2018 EYE EXAM RETINAL OR DILATED Q1 10/26/2018 PAP AKA CERVICAL CYTOLOGY 2019 DTaP/Tdap/Td series (2 - Td) 3/25/2027 Alergias  Review Complete El: 2018 Por: Wilver Lala LPN A partir del:  2018 No Known Allergies Vacunas actuales Revisadas el:  2018 Peyton Reyes Tdap 3/25/2017  3:58 AM  
  
 No revisadas esta visita You Were Diagnosed With   
  
 Fabiola Coho DUB (dysfunctional uterine bleeding)    -  Primary ICD-10-CM: N93.8 ICD-9-CM: 626.8 Partes vitales PS Pulso Temperatura Resp [place] ( percentil de crecimiento) Peso (percentil de crecimiento) 156/84 (BP 1 Location: Left arm, BP Patient Position: Sitting) 78 97.8 °F (36.6 °C) (Oral) 16 5' (1.524 m) 201 lb (91.2 kg) SpO2 BMI (IMC) Estado obstétrico Estatus de tabaquísmo 97% 39.26 kg/m2 Having regular periods Never Smoker Historial de signos vitales BMI and BSA Data Body Mass Index Body Surface Area  
 39.26 kg/m 2 1.96 m 2 [facility] Name Phone [address] [place] [address] [phone] Harris lista de medicamentos actualizada Lista actualizada el: 18  1:41 PM.  Jose Salgado use harris lista de medicamentos más reciente. citalopram 20 mg tablet También conocido mohan:  Baudilio De La Cruz Take 1/2 tab by mouth daily x 1 week and then increase to 1 tab daily. Please provide Luxem[place]ish instructions  
  
 metFORMIN  mg tablet También conocido mohan:  GLUCOPHAGE XR Take 2 Tabs by mouth daily (with dinner). For diabetes Yoruba sig  
  
 naproxen 500 mg tablet También conocido mohan:  NAPROSYN Take 1 Tab by mouth two (2) times daily (with meals). Instrucciones de seguimiento Return in about 3 weeks (around 2018). Por hacer 2018 8:00 AM  
  Appointment with [address] [place] [address] 1 at [facility] ([phone]) Adult Patient: Patient should drink 32 to 48 ozs of water, clear liquids, tea or juice one hour prior to exam (no carbonated beverages or milk)  Do not void (urinate). Your bladder must be full for this scan to be successful. Once the exam is completed, you can void immediately. GENERAL INSTRUCTIONS 1. Bring any non [facility] films/reports pertaining to the area being studied with you on the day of appointment. 2. A written order with a valid diagnosis and Physicians signature is required for all scheduled tests. 3. Check in at registration 30 minutes before your appointment time unless you were instructed to do otherwise. Pediatric:  to 11years of age: -Encourage water,juice or formula the day of the exam. -If the child is toilet trained, TRY NOT to allow the child to void for one hour prior to the study, so that the bladder will be full. -Please bring a full bottle if your child is an infant, as it may be needed during the exam.  Pediatric: 5 years to 25years old -Finish drinking 28 to 50 ozs. of water, clear liquids,tea or juice one hour prior to the exam ( no carbonated beverages or milk) -Do not void for one hour prior to the procedure.   
  
 2018 8:30 AM  
 Appointment with [address] 1 at [facility] ([phone]) Adult Patient: Patient should drink 32 to 48 ozs of water, clear liquids, tea or juice one hour prior to exam (no carbonated beverages or milk)  Do not void (urinate). Your bladder must be full for this scan to be successful. Once the exam is completed, you can void immediately. GENERAL INSTRUCTIONS 1. Bring any non [facility] facility films/reports pertaining to the area being studied with you on the day of appointment. 2. A written order with a valid diagnosis and Physicians signature is required for all scheduled tests. 3. Check in at registration 30 minutes before your appointment time unless you were instructed to do otherwise. Pediatric:  to 11years of age: -Encourage water,juice or formula the day of the exam. -If the child is toilet trained, TRY NOT to allow the child to void for one hour prior to the study, so that the bladder will be full. -Please bring a full bottle if your child is an infant, as it may be needed during the exam.  Pediatric: 5 years to 25years old -Finish drinking 28 to 50 ozs. of water, clear liquids,tea or juice one hour prior to the exam ( no carbonated beverages or milk) -Do not void for one hour prior to the procedure. Introducing [facility]! [facility] introduce portal paciente MyChart . Ahora se puede acceder a partes de foster expediente médico, enviar por correo electrónico la oficina de foster médico y solicitar renovaciones de medicamentos en línea. En foster navegador de Internet , Niharika Feliz a [email or web address] Alison clic en el usuario por [place]? Dellie Shines clic aquí en la sesión Claudia Hagen. Verá la página de registro [place]. Ingrese foster código de Bank  Jayiln michael y mohan aparece a continuación. Usted no tendrá que [employer] código después de deandra completado el proceso de registro .  Si usted no se inscribe antes de la fecha de caducidad , debe solicitar un nuevo código. · MyChart Código de acceso : [id number] Expires: 2/3/2018 12:30 PM 
 
Ingresa los últimos cuatro dígitos de foster Número de Seguro Social ( xxxx ) y fecha de nacimiento ( dd / mm / aaaa ) mohan se indica y alison clic en Enviar. Usted será llevado a la siguiente página de registro . Crear un ID MyChart . Esta será foster ID de inicio de sesión de MyChart y no puede ser [place] , por lo que pensar en scott que es [place] y fácil de recordar . Crear scott contraseña MyChart . Usted puede cambiar foster contraseña en cualquier momento . Ingrese foster Password Reset de preguntas y [place] . [place] se puede utilizar en un momento posterior si usted olvida foster contraseña. Introduzca foster dirección de correo electrónico . Clyda [place] recibirá scott notificación por correo electrónico cuando la nueva información está disponible en MyChart . Claudetta [place] clic en Registrarse. Nasim Dicker becki y descargar porciones de foster expediente médico. 
Alison clic en el enlace de descarga del menú Resumen para descargar scott copia portátil de foster información médica . Si tiene [employer] , por favor visite la sección de preguntas frecuentes del sitio web MyChart . Recuerde, MyChart NO es que se utilizará para las necesidades urgentes. Para emergencias médicas , llame al 911 . Ahora disponible en foster iPhone y Android ! Por favor proporcione raul resumen de la documentación de cuidado a foster próximo proveedor. Your primary care clinician is listed as NONE. If you have any questions after today's visit, please call [phone].

## 2018-01-22 ENCOUNTER — HOSPITAL ENCOUNTER (OUTPATIENT)
Dept: ULTRASOUND IMAGING | Age: 38
Discharge: HOME | End: 2018-01-22
Payer: SUBSIDIZED

## 2018-01-22 DIAGNOSIS — N93.8 DUB (DYSFUNCTIONAL UTERINE BLEEDING): ICD-10-CM

## 2018-01-22 DIAGNOSIS — N85.2 ENLARGED UTERUS: ICD-10-CM

## 2018-01-22 PROCEDURE — 76856 US EXAM PELVIC COMPLETE: CPT

## 2018-01-22 PROCEDURE — 76830 TRANSVAGINAL US NON-OB: CPT

## 2018-02-25 ENCOUNTER — APPOINTMENT (OUTPATIENT)
Dept: GENERAL RADIOLOGY | Age: 38
End: 2018-02-25
Attending: EMERGENCY MEDICINE
Payer: SUBSIDIZED

## 2018-02-25 ENCOUNTER — HOSPITAL ENCOUNTER (EMERGENCY)
Age: 38
Discharge: HOME | End: 2018-02-25
Attending: EMERGENCY MEDICINE
Payer: SUBSIDIZED

## 2018-02-25 VITALS
HEART RATE: 81 BPM | RESPIRATION RATE: 18 BRPM | TEMPERATURE: 98.3 F | OXYGEN SATURATION: 97 % | WEIGHT: 201 LBS | SYSTOLIC BLOOD PRESSURE: 139 MMHG | DIASTOLIC BLOOD PRESSURE: 91 MMHG | BODY MASS INDEX: 39.46 KG/M2 | HEIGHT: 60 IN

## 2018-02-25 DIAGNOSIS — M25.562 ACUTE PAIN OF LEFT KNEE: Primary | ICD-10-CM

## 2018-02-25 PROCEDURE — 99282 EMERGENCY DEPT VISIT SF MDM: CPT

## 2018-02-25 PROCEDURE — 73562 X-RAY EXAM OF KNEE 3: CPT

## 2018-02-25 RX ORDER — IBUPROFEN 600 MG/1
600 TABLET ORAL
Qty: 20 TAB | Refills: 0 | Status: SHIPPED | OUTPATIENT
Start: 2018-02-25 | End: 2018-03-12

## 2018-02-25 NOTE — LETTER
[address] 
[address] [facility] [address] 
[phone] Work/School Note Date: 2/25/2018 To Whom It May concern: Shameka Santos was seen and treated today in the emergency room by the following provider(s): 
Attending Provider: Lindsay Hua MD.   
 
Shameka Santos may return to work on 2/28/2018.  
 
Sincerely, 
 
 
 
 
Lindsay Hua MD

## 2018-02-25 NOTE — ED TRIAGE NOTES
Pt presents with complaints of left knee pain x 2 weeks. Pt denies any injury. Pt states that pain is in the front of her knee and radiates into the back of her knee. Pt speaks [place] citizen, triage obtained through translation service.

## 2018-02-25 NOTE — DISCHARGE INSTRUCTIONS
[facility] de rodilla: Instrucciones de cuidado - [ Knee Pain or Injury: Care Instructions ]  Instrucciones de [address] lesiones son [place] causa común de los problemas de rodilla. Las lesiones repentinas (agudas) pueden estar causadas por un impacto directo a la rodilla. También pueden estar causadas por un giro, flexión o caída anormales sobre la rodilla. El dolor, los moretones o la hinchazón pueden ser intensos y pueden comenzar dentro de minutos de ocurrida la lesión. El uso excesivo es otra causa de dolor en la rodilla. Otras causas son subir escaleras, arrodillarse y hacer otras actividades en las que se R Kirit 11. El desgaste cotidiano, especialmente al envejecer, también puede causar dolor de rodilla. El descanso junto con el tratamiento en el [facility] suelen aliviar el dolor y permitir que sane la rodilla. Juan Luis si usted tiene scott lesión importante en la rodilla, podría necesitar exámenes y tratamiento. La atención de seguimiento es scott parte clave de foster tratamiento y seguridad. Asegúrese de hacer y acudir a todas las citas, y llame a foster médico si está teniendo problemas. También es scott buena idea saber los resultados de los exámenes y mantener scott lista de los medicamentos que jessica. ¿Cómo puede cuidarse en el [facility]? · Sea liza con los medicamentos. Dana y siga todas las indicaciones en la etiqueta. ¨ Si el médico le recetó analgésicos (medicamentos para el dolor), tómelos según las indicaciones. ¨ Si no está tomando un analgésico recetado, pregúntele a foster médico si puede suhail jonelle de [employer]. · Descanse y proteja foster rodilla. Deje de hacer cualquier actividad que pudiera causarle dolor. · Aplíquese hielo o scott compresa fría sobre la rodilla por entre 10 y 21 minutos cada vez. Póngase un paño lind entre el hielo y la piel. · Apoye la rodilla adolorida sobre scott almohada cuando se aplica hielo o en cualquier momento que se siente o acueste fatemeh los 3 días siguientes. Trate de mantenerla por encima del nivel del corazón. [place] ayudará a reducir la hinchazón. · Si foster rodilla no está hinchada, puede aplicar calor húmedo, scott almohadilla térmica o un paño tibio sobre aditya. · Si foster médico le recomienda un vendaje elástico, [place] A[place] de compresión u otro tipo de soporte para la rodilla, úselos según se lo indique. · [address] foster médico acerca de cuánto peso puede poner sobre la pierna. Use un bastón, muletas o un andador michael mohan se lo hayan indicado. · [address] de foster médico acerca de la actividad fatemeh foster proceso de sanación. Si puede hacer ejercicio leve, aumente la actividad lentamente. · [place] y [place] un peso saludable. El exceso de peso puede sobrecargar las articulaciones, especialmente las rodillas y las caderas, y empeorar el dolor. Bajar incluso unas pocas libras podría ayudar. ¿Cuándo debe pedir ayuda? Llame al 911 en cualquier momento que considere que necesita atención de [place]. Por ejemplo, llame si:  ? · Tiene síntomas de un coágulo de mae en el pulmón (llamado embolia pulmonar). Estos pueden incluir:  ¨ Dolor repentino en el pecho. ¨ Problemas para respirar. ¨ Toser mae. ?Llame a foster médico ahora mismo o busque atención médica inmediata si:  ? · El dolor aumenta o es muy intenso. ? · La pierna o el pie se pone frío o cambia de color. ? · No puede ponerse de pie o poner [employer] rodilla. ? · La rodilla parece torcida o deformada. ? · No puede  la rodilla. ? · Tiene señales de infección, tales mohan:  ¨ Aumento del dolor, la hinchazón, el enrojecimiento o la temperatura. ¨ Vetas rojizas que comienzan en la rodilla. ¨ Pus que sale de alguna parte de la rodilla. Chakraborty [place]. ? · Tiene señales de un coágulo de mae en la pierna (llamado trombosis venosa profunda), tales mohan:  ¨ Dolor en la pantorrilla, el muslo, la pasquale o detrás de la rodilla. ¨ Enrojecimiento e hinchazón en la pierna o la pasquale. ?Preste especial atención a los cambios en foster frederic y asegúrese de comunicarse con foster médico si:  ? · Tiene hormigueo, debilidad o entumecimiento en la rodilla. ? · Tiene cualquier síntoma nuevo, por ejemplo, hinchazón. ? · Tiene moretones por scott lesión de rodilla que serna más de 2 semanas. ? · No mejora mohan se esperaba. ¿Dónde puede encontrar más información en inglés? Claudia Puckett a [email or web address]-david.info/. Carey Late B015 en la búsqueda para aprender más acerca de \"Dolor o lesión de rodilla: Instrucciones de cuidado - [ Knee Pain or Injury: Care Instructions ]. \"  Revisado: 20 Jad Albert 2017  Versión del contenido: 11.4  © 0741-4308 Healthwise, Incorporated. Las instrucciones de cuidado fueron adaptadas bajo licencia por [employer] (which disclaims liability or warranty for this information). Si usted tiene [place] afección médica o sobre estas instrucciones, siempre pregunte a foster profesional de frederic. Healthwise, Incorporated niega toda garantía o responsabilidad por foster uso de esta información. We hope that we have addressed all of your medical concerns. The examination and treatment you received in the Emergency Department were for an emergent problem and were not intended as complete care. It is important that you follow up with your healthcare provider(s) for ongoing care. If your symptoms worsen or do not improve as expected, and you are unable to reach your usual health care provider(s), you should return to the Emergency Department. Today's healthcare is undergoing tremendous change, and patient satisfaction surveys are one of the many tools to assess the quality of medical care. You may receive a survey from the [employer] organization regarding your experience in the Emergency Department. I hope that your experience has been completely positive, particularly the medical care that I provided.   As such, please participate in the survey; anything less than excellent does not meet my expectations or intentions. On license of [facility] and [address] [place] [address] participate in nationally recognized quality of care measures. If your blood pressure is greater than 120/80, as reported below, we urge that you seek medical care to address the potential of high blood pressure, commonly known as hypertension. Hypertension can be hereditary or can be caused by certain medical conditions, pain, stress, or \"white coat syndrome. \"       Please make an appointment with your health care provider(s) for follow up of your Emergency Department visit. VITALS:   Patient Vitals for the past 8 hrs:   Temp Pulse Resp BP SpO2   02/25/18 1319 98.1 °F (36.7 °C) 79 18 139/89 97 %          Thank you for allowing us to provide you with medical care today. We realize that you have many choices for your emergency care needs. Please choose us in the future for any continued health care needs. Regards,           Dacia Zeng MD    On license of [facility].   Office: [phone]            No results found for this or any previous visit (from the past 24 hour(s)). Xr Knee Lt 3 V    Result Date: 2/25/2018  EXAM:  XR KNEE LT 3 V INDICATION:   left knee pain for 2 weeks. COMPARISON: None. FINDINGS: Three views of the left knee demonstrate no fracture or other acute osseous or articular abnormality. There is a trace joint effusion. IMPRESSION:  Trace effusion. No acute fracture.

## 2018-02-26 ENCOUNTER — OFFICE VISIT (OUTPATIENT)
Dept: FAMILY MEDICINE CLINIC | Age: 38
End: 2018-02-26

## 2018-02-26 ENCOUNTER — HOSPITAL ENCOUNTER (OUTPATIENT)
Dept: LAB | Age: 38
Discharge: HOME | End: 2018-02-26

## 2018-02-26 VITALS
BODY MASS INDEX: 39.06 KG/M2 | HEART RATE: 84 BPM | SYSTOLIC BLOOD PRESSURE: 134 MMHG | TEMPERATURE: 98.8 F | DIASTOLIC BLOOD PRESSURE: 81 MMHG | WEIGHT: 200 LBS

## 2018-02-26 DIAGNOSIS — Z13.9 ENCOUNTER FOR SCREENING: Primary | ICD-10-CM

## 2018-02-26 DIAGNOSIS — E11.65 TYPE 2 DIABETES MELLITUS WITH HYPERGLYCEMIA, WITHOUT LONG-TERM CURRENT USE OF INSULIN (HCC): ICD-10-CM

## 2018-02-26 DIAGNOSIS — N89.8 VAGINAL ITCHING: ICD-10-CM

## 2018-02-26 LAB
ALBUMIN SERPL-MCNC: 3.6 G/DL (ref 3.5–5)
ALBUMIN/GLOB SERPL: 1 {RATIO} (ref 1.1–2.2)
ALP SERPL-CCNC: 106 U/L (ref 45–117)
ALT SERPL-CCNC: 85 U/L (ref 12–78)
ANION GAP SERPL CALC-SCNC: 10 MMOL/L (ref 5–15)
AST SERPL-CCNC: 43 U/L (ref 15–37)
BILIRUB SERPL-MCNC: 0.4 MG/DL (ref 0.2–1)
BILIRUB UR QL STRIP: NEGATIVE
BUN SERPL-MCNC: 15 MG/DL (ref 6–20)
BUN/CREAT SERPL: 19 (ref 12–20)
CALCIUM SERPL-MCNC: 9.1 MG/DL (ref 8.5–10.1)
CHLORIDE SERPL-SCNC: 101 MMOL/L (ref 97–108)
CO2 SERPL-SCNC: 24 MMOL/L (ref 21–32)
CREAT SERPL-MCNC: 0.8 MG/DL (ref 0.55–1.02)
EST. AVERAGE GLUCOSE BLD GHB EST-MCNC: 214 MG/DL
GLOBULIN SER CALC-MCNC: 3.6 G/DL (ref 2–4)
GLUCOSE SERPL-MCNC: 369 MG/DL (ref 65–100)
GLUCOSE UR-MCNC: NORMAL MG/DL
HBA1C MFR BLD: 9.1 % (ref 4.2–6.3)
KETONES P FAST UR STRIP-MCNC: NEGATIVE MG/DL
PH UR STRIP: 6 [PH] (ref 4.6–8)
POTASSIUM SERPL-SCNC: 4.4 MMOL/L (ref 3.5–5.1)
PROT SERPL-MCNC: 7.2 G/DL (ref 6.4–8.2)
PROT UR QL STRIP: NEGATIVE
SODIUM SERPL-SCNC: 135 MMOL/L (ref 136–145)
SP GR UR STRIP: 1.02 (ref 1–1.03)
TSH SERPL DL<=0.05 MIU/L-ACNC: 4.06 UIU/ML (ref 0.36–3.74)
UA UROBILINOGEN AMB POC: NORMAL (ref 0.2–1)
URINALYSIS CLARITY POC: CLEAR
URINALYSIS COLOR POC: YELLOW
URINE BLOOD POC: NEGATIVE
URINE LEUKOCYTES POC: NEGATIVE
URINE NITRITES POC: NEGATIVE

## 2018-02-26 PROCEDURE — 83036 HEMOGLOBIN GLYCOSYLATED A1C: CPT | Performed by: PHYSICIAN ASSISTANT

## 2018-02-26 PROCEDURE — 84443 ASSAY THYROID STIM HORMONE: CPT | Performed by: PHYSICIAN ASSISTANT

## 2018-02-26 PROCEDURE — 80053 COMPREHEN METABOLIC PANEL: CPT | Performed by: PHYSICIAN ASSISTANT

## 2018-02-26 RX ORDER — MICONAZOLE NITRATE 2 %
1 CREAM WITH APPLICATOR VAGINAL
Qty: 45 G | Refills: 0 | Status: SHIPPED | OUTPATIENT
Start: 2018-02-26 | End: 2018-03-12

## 2018-02-26 RX ORDER — FLUCONAZOLE 150 MG/1
150 TABLET ORAL DAILY
Qty: 1 TAB | Refills: 0 | Status: SHIPPED | OUTPATIENT
Start: 2018-02-26 | End: 2018-02-27

## 2018-02-26 RX ORDER — METFORMIN HYDROCHLORIDE 500 MG/1
2000 TABLET, EXTENDED RELEASE ORAL
Qty: 180 TAB | Refills: 1 | Status: SHIPPED | OUTPATIENT
Start: 2018-02-26 | End: 2019-02-19 | Stop reason: SDUPTHER

## 2018-02-26 NOTE — MR AVS SNAPSHOT
[address] [place] [address] [address] [id number] 
[phone] Patient: Catalina Miles MRN: [id number] :1980 Visit Information Andreina Lindquist Personal Médico Departamento Teléfono del Dep. Número de visita 2018 10:30 AM Saul Vinny [facility] [phone] [id number] Follow-up Instructions Return in about 2 weeks (around 3/12/2018). Upcoming Health Maintenance Date Due  
 LIPID PANEL Q1 1979 FOOT EXAM Q1 1989 MICROALBUMIN Q1 1989 Pneumococcal 19-64 Medium Risk (1 of 1 - PPSV23) 1998 Influenza Age 5 to Adult 2017 HEMOGLOBIN A1C Q6M 2018 EYE EXAM RETINAL OR DILATED Q1 10/26/2018 PAP AKA CERVICAL CYTOLOGY 2019 DTaP/Tdap/Td series (2 - Td) 3/25/2027 Alergias  Review Complete El: 2018 Por: Mansi Trotter RN  
 A partir del:  2018 No Known Allergies Vacunas actuales Revisadas el:  2018 Lizette Eisenberg Tdap 3/25/2017  3:58 AM  
  
 No revisadas esta visita You Were Diagnosed With   
  
 Blessing Mealing Encounter for screening    -  Primary ICD-10-CM: Z13.9 ICD-9-CM: V82.9 Vaginal itching     ICD-10-CM: L29.8 ICD-9-CM: 698.1 Type 2 diabetes mellitus with hyperglycemia, without long-term current use of insulin (HCC)     ICD-10-CM: E11.65 ICD-9-CM: 250.00, 790.29 Partes vitales PS Pulso Temperatura Peso (percentil de crecimiento) LMP (última anai) BMI (IMC)  
 134/81 (BP 1 Location: Left arm, BP Patient Position: Sitting) 84 98.8 °F (37.1 °C) (Oral) 200 lb (90.7 kg) 2018 39.06 kg/m2 Estado obstétrico Estatus de tabaquísmo Having regular periods Never Smoker Historial de signos vitales BMI and BSA Data Body Mass Index Body Surface Area 39.06 kg/m 2 1.96 m 2 [facility] Name Phone [address] [facility] [phone] Harris lista de medicamentos actualizada Lista actualizada 2/26/18 11:01 AM.  Ellie Thomson use harris lista de medicamentos más reciente. fluconazole 150 mg tablet También conocido mohan:  DIFLUCAN Take 1 Tab by mouth daily for 1 day. FDA advises cautious prescribing of oral fluconazole in pregnancy. ibuprofen 600 mg tablet También conocido mohan:  MOTRIN Take 1 Tab by mouth every six (6) hours as needed for Pain.  
  
 metFORMIN  mg tablet También conocido mohan:  GLUCOPHAGE XR Take 4 Tabs by mouth daily (with dinner). [place] 4 pastillas con hong  
  
 miconazole 2 % vaginal cream  
También conocido mohan:  MONISTAT 7 Insert 1 Applicator into vagina nightly. Recetas Enviado a la [place] Refills  
 fluconazole (DIFLUCAN) 150 mg tablet 0 Sig: Take 1 Tab by mouth daily for 1 day. FDA advises cautious prescribing of oral fluconazole in pregnancy. Class: Normal  
 Pharmacy: [facility] [address] [facility] Ph #: [phone] Route: Oral  
 miconazole (MONISTAT 7) 2 % vaginal cream 0 Sig: Insert 1 Applicator into vagina nightly. Class: Normal  
 Pharmacy: [facility] [address] [place] [address] Ph #: [phone] Route: Vaginal  
 metFORMIN ER (GLUCOPHAGE XR) 500 mg tablet 1 Sig: Take 4 Tabs by mouth daily (with dinner). [address] [facility] Class: Normal  
 Pharmacy: [facility] [address] [place] [address] Ph #: [phone] Route: Oral  
  
Hicimos lo siguiente AMB POC URINALYSIS DIP STICK MANUAL W/O MICRO [59839 CPT(R)] Instrucciones de seguimiento Return in about 2 weeks (around 3/12/2018). Por hacer 02/26/2018 Lab:  HEMOGLOBIN A1C WITH EAG   
  
 02/26/2018 Lab:  METABOLIC PANEL, COMPREHENSIVE   
  
 02/26/2018 Lab:  TSH 3RD GENERATION Instrucciones para el Paciente Aprenda acerca de las pautas alimentarias para diabetes - [ Learning About Diabetes Food Guidelines ] Instrucciones de cuidado La planificación de las comidas es importante para el manejo de la diabetes. Ayuda a mantener el azúcar en la mae en el nivel ideal (que usted fija con foster médico). Usted no tiene que comer alimentos especiales. Puede comer lo mismo que foster jaquan, incluso dulces de vez en cuando. Juan Luis debe prestar atención a la cantidad y la frecuencia con que come ciertos alimentos. Jorge vez desee colaborar con un dietista o educador de diabetes certificado (CDE, por jerilyn siglas en inglés) para que le ayuden a planificar las comidas y los refrigerios. Un dietista o CDE también puede ayudarle a bajar de peso si vinicio es jonelle de jerilyn objetivos. Claire Cuevas saber acerca de ingerir carbohidratos? Manejar la cantidad de carbohidratos que ingiere es scott parte importante de la alimentación saludable cuando tiene diabetes. Los carbohidratos se encuentran en muchos alimentos. · Sepa qué alimentos contienen carbohidratos. Y aprenda qué cantidad de carbohidratos contienen los diferentes alimentos. ¨ El pan, los cereales, la pasta y el arroz tienen aproximadamente 15 gramos de carbohidratos por porción. Scott porción equivale a 1 rebanada de pan (1 onza o 28 g), ½ taza de cereal cocido o 1/3 de taza de pasta o arroz cocidos. ¨ Las frutas tienen 15 gramos de carbohidratos por porción. Bernis Dixons porción es 1 fruta fresca pequeña, mohan scott [place] o scott naranja; ½ banana (plátano); ½ taza de fruta cocida o enlatada; ½ taza de jugo de fruta; 1 taza de melón o frambuesas; o 2 cucharadas de frutas secas. ¨ La leche y el yogur sin azúcar agregado tienen 15 gramos de carbohidratos por porción. Bernis Dixons porción es 1 taza de [place] o 2/3 taza de yogur sin azúcar agregado. ¨ Las verduras con almidón tienen 15 gramos de carbohidratos por porción.  Bernis Dixons porción es ½ taza de puré de papa o camote (batata, slim); 1 taza de calabacín; ½ papa horneada pequeña; ½ taza de frijoles cocidos; o ½ taza de maíz (elote) o arvejas (chícharos) cocidos. · Aprenda cuántos carbohidratos debe consumir cada día y en cada comida. Un dietista o CDE le puede enseñar cómo llevar la cuenta de los carbohidratos que consume. A esto se le llama recuento de carbohidratos. · Si no está seguro de cómo [employer] gramos de carbohidratos, utilice el Método del [place] para planificar las comidas. Es scott Daniel Freiberg y rápida de asegurarse de que consuma comidas equilibradas. También le ayuda a distribuir los carbohidratos fatemeh el día. ¨ Divida el plato por tipo de alimento. Llene medio plato con verduras sin almidón, ponga carne u otras proteínas en scott cuarta parte del plato y granos o verduras con almidón en el último cuarto del plato. A esto puede agregarle un pequeño pedazo de fruta y 3 taza de [place] o yogur, según la cantidad de carbohidratos que deba consumir en scott comida. · Trate de comer aproximadamente la misma cantidad de carbohidratos en cada comida. No \"reserve\" foster cantidad diaria de carbohidratos para consumirlos en scott grady comida. · Las proteínas contienen muy pocos o nada de carbohidratos por porción. Los ejemplos de proteínas incluyen carne de res, [place], [place], [place], [place], tofu, LoÃ­za-barre, requesón (\"cottage cheese\") y la mantequilla de cacahuate [place]). Scott porción de carne son 3 onzas (85 g), lo cual es aproximadamente del tamaño de scott baraja de naipes. Los ejemplos de porciones de sustitutos de la carne (equivalente a 1 onza o 28 g de carne) son 1/4 de taza de requesón, 1 huevo, 1 cucharada de [place] de cacahuate y ½ taza de tofu. Cómo puede comer fuera y aún así comer de modo saludable? · Aprenda a calcular los tamaños de las porciones de alimentos que contienen carbohidratos. Si mide la comida en casa, será más fácil calcular la cantidad en scott porción de comida de restaurante. · Si el platillo que pide contiene demasiados carbohidratos (mohan ashish, maíz o frijoles al horno), pida un alimento bajo en carbohidratos en harris lugar. Pida scott ensalada o verduras. · Si [place] insulina, revise harris azúcar en la mae antes y después de comer fuera para ayudarle a planear cuánto comer en el futuro. · Si usted come más carbohidratos de lo planeado en scott comida, dé un paseo o cuca otro tipo de ejercicio. [place] ayudará a reducir el azúcar en la mae. Qué más debería saber? · Limite las grasas saturadas, mohan la grasa de la carne y productos lácteos. Esta es scott opción saludable, porque las personas que tienen diabetes tienen un mayor riesgo de enfermedades del corazón. Así que elija carr magros de carne y productos lácteos descremados o semidescremados. Utilice aceite de graham o de canola en lugar de mantequilla o manteca al cocinar. · No se salte comidas. Harris nivel de azúcar en la mae puede bajar demasiado si usted se salta comidas y jessica insulina o ciertos medicamentos para la diabetes. · Consulte con harris médico antes de beber alcohol. El alcohol puede hacer que harris azúcar en la mae baje demasiado. El alcohol también puede causar scott reacción adversa si usted jessica ciertos medicamentos para la diabetes. La atención de seguimiento es scott parte clave de harris tratamiento y seguridad. Asegúrese de hacer y acudir a todas las citas, y llame a harris médico si está teniendo problemas. También es scott buena idea saber los resultados de los exámenes y mantener scott lista de los medicamentos que jessica. Dónde puede encontrar más información en inglés? Johnnie Angulo a http://damion-david.info/. Mary Bañuelos B059 en la búsqueda para aprender más acerca de \"Aprenda acerca de las pautas alimentarias para diabetes - [ Learning About Diabetes Food Guidelines ]. \" 
Revisado: 13 marzo, 2017 Versión del contenido: 11.4 © 6098-2620 Healthwise, Incorporated.  Las instrucciones de cuidado fueron adaptadas bajo licencia por [employer] (which disclaims liability or warranty for this information). Si usted tiene [place] Wrights afección médica o sobre estas instrucciones, siempre pregunte a foster profesional de frederic. [facility] niega toda garantía o responsabilidad por foster uso de esta información. Introducing [facility] SERVICES! [facility] introduce portal paciente MyChart . Ahora se puede acceder a partes de foster expediente médico, enviar por correo electrónico la oficina de ofster médico y solicitar renovaciones de medicamentos en línea. En foster navegador de Internet , [place] a [email or web address] Cuca clic en el usuario por Erinn Husbands? Imani Willie clic aquí en la sesión [facility]. Verá la página de registro [place]. Ingrese foster código de [employer] michael y mohan aparece a continuación. Usted no tendrá que [employer] código después de deandra completado el proceso de registro . Si usted no se inscribe antes de la fecha de caducidad , debe solicitar un nuevo código. · MyChart Código de acceso : [id number] Expires: 5/26/2018  1:54 PM 
 
Ingresa los últimos cuatro dígitos de foster Número de Seguro Social ( xxxx ) y fecha de nacimiento ( dd / mm / aaaa ) mohan se indica y cuca clic en Enviar. ted será llevado a la siguiente página de registro . Crear un ID MyChart . Esta será foster ID de inicio de sesión de MyChart y no puede ser [place] , por lo que pensar en scott que es [place] y fácil de recordar . Crear scott contraseña MyChart . Usted puede cambiar foster contraseña en cualquier momento . Ingrese foster Password Reset de preguntas y [place] . [place] se puede utilizar en un momento posterior si usted olvida foster contraseña. Introduzca foster dirección de correo electrónico . Marcella Hunter recibirá scott notificación por correo electrónico cuando la nueva información está disponible en MyChart . Geovanny Blow joseph en Registrarse.  Littie Chi becki y descargar porciones de foster expediente Doloris Canal clic en el enlace de descarga del menú Resumen para descargar scott copia portátil de foster información médica . Si tiene [employer] , por favor visite la sección de preguntas frecuentes del sitio web MyChart . Recuerde, MyChart NO es que se utilizará para las necesidades urgentes. Para emergencias médicas , llame al 911 . Ahora disponible en foster iPhone y Android ! Por favor proporcione raul resumen de la documentación de cuidado a foster próximo proveedor. Your primary care clinician is listed as NONE. If you have any questions after today's visit, please call [phone].

## 2018-02-26 NOTE — PATIENT INSTRUCTIONS
Aprenda acerca de las pautas alimentarias para diabetes - [ Learning About Diabetes Food Guidelines ]  Instrucciones de cuidado    La planificación de las comidas es importante para el manejo de la diabetes. Ayuda a mantener el azúcar en la mae en el nivel ideal (que usted fija con foster médico). Usted no tiene que comer alimentos especiales. Puede comer lo mismo que foster jaquan, incluso dulces de vez en cuando. Juan Luis debe prestar atención a la cantidad y la frecuencia con que come ciertos alimentos. Jorge vez desee colaborar con un dietista o educador de diabetes certificado (CDE, por jerilyn siglas en inglés) para que le ayuden a planificar las comidas y los refrigerios. Un dietista o CDE también puede ayudarle a bajar de peso si vinicio es jonelle de jerilyn objetivos. ¿Qué debería saber acerca de ingerir carbohidratos? Manejar la cantidad de carbohidratos que ingiere es scott parte importante de la alimentación saludable cuando tiene diabetes. Los carbohidratos se encuentran en muchos alimentos. · Sepa qué alimentos contienen carbohidratos. Y aprenda qué cantidad de carbohidratos contienen los diferentes alimentos. ¨ El pan, los cereales, la pasta y el arroz tienen aproximadamente 15 gramos de carbohidratos por porción. Scott porción equivale a 1 rebanada de pan (1 onza o 28 g), ½ taza de cereal cocido o 1/3 de taza de pasta o arroz cocidos. ¨ Las frutas tienen 15 gramos de carbohidratos por porción. Zi Sand porción es 1 fruta fresca pequeña, mohan scott [place] and[place] o scott naranja; ½ banana (plátano); ½ taza de fruta cocida o enlatada; ½ taza de jugo de fruta; 1 taza de melón o frambuesas; o 2 cucharadas de frutas secas. ¨ La leche y el yogur sin azúcar agregado tienen 15 gramos de carbohidratos por porción. Zi Sand porción es 1 taza de [place] o 2/3 taza de yogur sin azúcar agregado. ¨ Las verduras con almidón tienen 15 gramos de carbohidratos por porción.  Zi Sand porción es ½ taza de puré de papa o camote (batata, jamalo); 1 taza de calabacín; ½ papa horneada pequeña; ½ taza de frijoles cocidos; o ½ taza de maíz (elote) o arvejas (chícharos) cocidos. · Aprenda cuántos carbohidratos debe consumir cada día y en cada comida. Un dietista o CDE le puede enseñar cómo llevar la cuenta de los carbohidratos que consume. A esto se le llama recuento de carbohidratos. · Si no está seguro de cómo [employer] gramos de carbohidratos, utilice el Método del [place] para planificar las comidas. Es scott Bernell Felipe y rápida de asegurarse de que consuma comidas equilibradas. También le ayuda a distribuir los carbohidratos fatemeh el día. ¨ Divida el plato por tipo de alimento. Llene medio plato con verduras sin almidón, ponga carne u otras proteínas en scott cuarta parte del plato y granos o verduras con almidón en el último cuarto del plato. A esto puede agregarle un pequeño pedazo de fruta y 3 taza de [place] o yogur, según la cantidad de carbohidratos que deba consumir en scott comida. · Trate de comer aproximadamente la misma cantidad de carbohidratos en cada comida. No \"reserve\" foster cantidad diaria de carbohidratos para consumirlos en scott grady comida. · Las proteínas contienen muy pocos o nada de carbohidratos por porción. Los ejemplos de proteínas incluyen carne de res, [place], [place], [place], [place], tofu, Jose-barre, requesón (\"cottage cheese\") y la mantequilla de cacahuate [place]). Scott porción de carne son 3 onzas (85 g), lo cual es aproximadamente del tamaño de scott baraja de naipes. Los ejemplos de porciones de sustitutos de la carne (equivalente a 1 onza o 28 g de carne) son 1/4 de taza de requesón, 1 huevo, 1 cucharada de [place] de cacahuate y ½ taza de tofu. ¿Cómo puede comer fuera y aún así comer de modo saludable? · Aprenda a calcular los tamaños de las porciones de alimentos que contienen carbohidratos. Si mide la comida en casa, será más fácil calcular la cantidad en scott porción de comida de restaurante.   · Si el platillo que pide contiene demasiados carbohidratos (mohan ashish, maíz o frijoles al horno), pida un alimento bajo en carbohidratos en harris lugar. Pida scott ensalada o verduras. · Si [place] insulina, revise harris azúcar en la mae antes y después de comer fuera para ayudarle a planear cuánto comer en el futuro. · Si usted come más carbohidratos de lo planeado en scott comida, dé un paseo o cuca otro tipo de ejercicio. [place] ayudará a reducir el azúcar en la mae. ¿Qué más debería saber? · Limite las grasas saturadas, mohan la grasa de la carne y productos lácteos. Esta es scott opción saludable, porque las personas que tienen diabetes tienen un mayor riesgo de enfermedades del corazón. Así que elija carr magros de carne y productos lácteos descremados o semidescremados. Utilice aceite de graham o de canola en lugar de mantequilla o manteca al cocinar. · No se salte comidas. Harris nivel de azúcar en la mae puede bajar demasiado si usted se salta comidas y jessica insulina o ciertos medicamentos para la diabetes. · Consulte con harris médico antes de beber alcohol. El alcohol puede hacer que harris azúcar en la mae baje demasiado. El alcohol también puede causar scott reacción adversa si usted jessica ciertos medicamentos para la diabetes. La atención de seguimiento es scott parte clave de harris tratamiento y seguridad. Asegúrese de hacer y acudir a todas las citas, y llame a harris médico si está teniendo problemas. También es scott buena idea saber los resultados de los exámenes y mantener scott lista de los medicamentos que jessica. ¿Dónde puede encontrar más información en inglés? Yanique Begin a http://damion-david.info/. Velton Kawasaki Z515 en la búsqueda para aprender más acerca de \"Aprenda acerca de las pautas alimentarias para diabetes - [ Learning About Diabetes Food Guidelines ]. \"  Revisado: 13 marzo, 2017  Versión del contenido: 11.4  © 9091-3276 Healthwise, Incorporated.  Las instrucciones de cuidado fueron adaptadas bajo licencia por [employer] (which disclaims liability or warranty for this information). Si usted tiene [place] afección médica o sobre estas instrucciones, siempre pregunte a foster profesional de frederic. [facility] niega toda garantía o responsabilidad por foster uso de esta información.

## 2018-02-26 NOTE — ED PROVIDER NOTES
HPI Comments: 42yo F with pmh sig for DM who p/w L knee pain. No fall or injury. Pain located in front of knee. Worsened with ambulation and full extension. Denies swelling, redness. No pain medications taken. Having difficulty doing her job which requires her to be on her feet all day. Patient is a 44 y.o. female presenting with knee pain. Knee Pain           Past Medical History:   Diagnosis Date    Diabetes [facility])     IUD (intrauterine device) since 2013        Past Surgical History:   Procedure Laterality Date    HX APPENDECTOMY           History reviewed. No pertinent family history. Social History     Social History    Marital status: SINGLE     Spouse name: N/A    Number of children: N/A    Years of education: N/A     Occupational History    Not on file. Social History Main Topics    Smoking status: Never Smoker    Smokeless tobacco: Never Used    Alcohol use No    Drug use: No    Sexual activity: Not on file     Other Topics Concern    Not on file     Social History Narrative         ALLERGIES: Review of patient's allergies indicates no known allergies. Review of Systems   Constitutional: Negative for fever. HENT: Negative for facial swelling. Eyes: Negative for visual disturbance. Respiratory: Negative for chest tightness. Cardiovascular: Negative for chest pain. Gastrointestinal: Negative for abdominal pain. Genitourinary: Negative for dysuria. Musculoskeletal: Negative for arthralgias. Skin: Negative for rash. Neurological: Negative for dizziness. Hematological: Negative for adenopathy. Psychiatric/Behavioral: Negative for suicidal ideas. Vitals:    02/25/18 1319 02/25/18 1416   BP: 139/89 (!) 139/91   Pulse: 79 81   Resp: 18 18   Temp: 98.1 °F (36.7 °C) 98.3 °F (36.8 °C)   SpO2: 97% 97%   Weight: 91.2 kg (201 lb)    Height: 5' (1.524 m)             Physical Exam   Constitutional: She is oriented to person, place, and time.  She appears well-developed and well-nourished. No distress. HENT:   Head: Normocephalic and atraumatic. Mouth/Throat: Oropharynx is clear and moist.   Eyes: Pupils are equal, round, and reactive to light. No scleral icterus. Neck: Normal range of motion. Neck supple. No thyromegaly present. Cardiovascular: Normal rate, regular rhythm, normal heart sounds and intact distal pulses. No murmur heard. Pulmonary/Chest: Effort normal and breath sounds normal. No respiratory distress. Abdominal: Soft. Bowel sounds are normal. She exhibits no distension. There is no tenderness. Musculoskeletal: Normal range of motion. She exhibits no edema. Neurological: She is alert and oriented to person, place, and time. Skin: Skin is warm and dry. No rash noted. She is not diaphoretic. Nursing note and vitals reviewed. [facility]      ED Course       Procedures    Xray unremarkable. Likely ligamentous or soft tissue inflammation. Stable for discharge home. RICE. Ibuprofen.

## 2018-02-26 NOTE — PROGRESS NOTES
Assessment/Plan:    Diagnoses and all orders for this visit:    1. Encounter for screening  -     AMB POC URINALYSIS DIP STICK MANUAL W/O MICRO    2. Vaginal itching- this prompted a blood sugar check which was 420. Stop all sugary drinks and foods. Double up on metformin to 2gm a day extended release. Close f/up in 2 weeks    She is NOT on celexa so this has been discontinued from her chart. She can take diflucan and monistat. Follow for labs. Suspect a1c is much higher then 7.6 which was at her last check    DM 2 not to goal- double metformin 500 ER from 2 po every day to 4 po every day     Follow-up Disposition: Not on [facility] ROGERS Hsieh expressed understanding of this plan. An AVS was printed and given to the patient.      ----------------------------------------------------------------------    Chief Complaint   Patient presents with    Follow-up     Ultrasound results      Vaginal Itching     Vaginal  Itching, burning and pain with urination X 1 week       History of Present Illness:    Pt here for scheduled f/up after pelvic ultrasounds. She went to the ER yesterday for left knee pain. She has started the naprosyn and ice to the knee- review of ER shows neg xray, additionally she denies any acute injury. She complains of vaginal itching with burning externally during urination. She does not check her blood sugar at home, she does not have a machine. She has been taking her metformin as prescribed. Last a1c was 7.6 in 7/17. Her blood sugar this morning is 420.  She is not having polyuria/polydipsia/ unexplained weight loss or blurry vision  She has a glucometer at home but no strips, she thinks that she can get the strips  Discussed that her pelvic ultrasound does not show IUD and that there were no masses on the ultrasound      Past Medical History:   Diagnosis Date    Diabetes ([address]     IUD (intrauterine device) since 2013        Current Outpatient Prescriptions Medication Sig Dispense Refill    ibuprofen (MOTRIN) 600 mg tablet Take 1 Tab by mouth every six (6) hours as needed for Pain. 20 Tab 0    citalopram (CELEXA) 20 mg tablet Take 1/2 tab by mouth daily x 1 week and then increase to 1 tab daily. Please provide Setswana instructions 30 Tab 2    metFORMIN ER (GLUCOPHAGE XR) 500 mg tablet Take 2 Tabs by mouth daily (with dinner). For diabetes Portuguese sig 180 Tab 1       No Known Allergies    Social History   Substance Use Topics    Smoking status: Never Smoker    Smokeless tobacco: Never Used    Alcohol use No       No family history on file. Physical Exam:     Visit Vitals    /81 (BP 1 Location: Left arm, BP Patient Position: Sitting)    Pulse 84    Temp 98.8 °F (37.1 °C) (Oral)    Wt 200 lb (90.7 kg)    LMP 02/08/2018    BMI 39.06 kg/m2     gen looks well, pleasant  A&Ox3  WDWN NAD  Respirations normal and non labored  Lab Results   Component Value Date/Time    Hemoglobin A1c 8.6 (H) 12/08/2017 12:28 PM     Lab Results   Component Value Date/Time    TSH 2.77 12/08/2017 12:28 PM     Lab Results   Component Value Date/Time    Sodium 135 (L) 12/08/2017 12:28 PM    Potassium 3.9 12/08/2017 12:28 PM    Chloride 100 12/08/2017 12:28 PM    CO2 28 12/08/2017 12:28 PM    Anion gap 7 12/08/2017 12:28 PM    Glucose 193 (H) 12/08/2017 12:28 PM    BUN 17 12/08/2017 12:28 PM    Creatinine 0.74 12/08/2017 12:28 PM    BUN/Creatinine ratio 23 (H) 12/08/2017 12:28 PM    GFR est AA >60 12/08/2017 12:28 PM    GFR est non-AA >60 12/08/2017 12:28 PM    Calcium 8.8 12/08/2017 12:28 PM    Bilirubin, total 0.5 12/08/2017 12:28 PM    AST (SGOT) 27 12/08/2017 12:28 PM    Alk.  phosphatase 105 12/08/2017 12:28 PM    Protein, total 7.3 12/08/2017 12:28 PM    Albumin 3.7 12/08/2017 12:28 PM    Globulin 3.6 12/08/2017 12:28 PM    A-G Ratio 1.0 (L) 12/08/2017 12:28 PM    ALT (SGPT) 40 12/08/2017 12:28 PM

## 2018-02-27 NOTE — PROGRESS NOTES
Pt came in yesterday for vaginal itching and random blood sugar > 400. a1c has been trending upwards so I increased her meds. Short interval f/up was scheduled with you. Thanks!

## 2018-03-12 ENCOUNTER — HOSPITAL ENCOUNTER (OUTPATIENT)
Dept: LAB | Age: 38
Discharge: HOME | End: 2018-03-12

## 2018-03-12 ENCOUNTER — OFFICE VISIT (OUTPATIENT)
Dept: FAMILY MEDICINE CLINIC | Age: 38
End: 2018-03-12

## 2018-03-12 VITALS
BODY MASS INDEX: 38.67 KG/M2 | DIASTOLIC BLOOD PRESSURE: 84 MMHG | SYSTOLIC BLOOD PRESSURE: 122 MMHG | TEMPERATURE: 98.2 F | WEIGHT: 198 LBS | HEART RATE: 80 BPM

## 2018-03-12 DIAGNOSIS — R79.89 ELEVATED TSH: ICD-10-CM

## 2018-03-12 DIAGNOSIS — M25.562 LEFT ANTERIOR KNEE PAIN: ICD-10-CM

## 2018-03-12 DIAGNOSIS — E11.65 TYPE 2 DIABETES MELLITUS WITH HYPERGLYCEMIA, WITHOUT LONG-TERM CURRENT USE OF INSULIN (HCC): ICD-10-CM

## 2018-03-12 DIAGNOSIS — M70.52 INFRAPATELLAR BURSITIS OF LEFT KNEE: ICD-10-CM

## 2018-03-12 DIAGNOSIS — E11.65 TYPE 2 DIABETES MELLITUS WITH HYPERGLYCEMIA, WITHOUT LONG-TERM CURRENT USE OF INSULIN (HCC): Primary | ICD-10-CM

## 2018-03-12 LAB
CHOLEST SERPL-MCNC: 191 MG/DL
CREAT UR-MCNC: 105 MG/DL
GLUCOSE POC: 164 MG/DL
HDLC SERPL-MCNC: 56 MG/DL
HDLC SERPL: 3.4 {RATIO} (ref 0–5)
LDLC SERPL CALC-MCNC: 82.8 MG/DL (ref 0–100)
LIPID PROFILE,FLP: ABNORMAL
MICROALBUMIN UR-MCNC: 3.84 MG/DL
MICROALBUMIN/CREAT UR-RTO: 37 MG/G (ref 0–30)
T4 FREE SERPL-MCNC: 1 NG/DL (ref 0.8–1.5)
TRIGL SERPL-MCNC: 261 MG/DL (ref ?–150)
TSH SERPL DL<=0.05 MIU/L-ACNC: 4.01 UIU/ML (ref 0.36–3.74)
VLDLC SERPL CALC-MCNC: 52.2 MG/DL

## 2018-03-12 PROCEDURE — 84443 ASSAY THYROID STIM HORMONE: CPT | Performed by: FAMILY MEDICINE

## 2018-03-12 PROCEDURE — 82043 UR ALBUMIN QUANTITATIVE: CPT | Performed by: FAMILY MEDICINE

## 2018-03-12 PROCEDURE — 80061 LIPID PANEL: CPT | Performed by: FAMILY MEDICINE

## 2018-03-12 PROCEDURE — 84439 ASSAY OF FREE THYROXINE: CPT | Performed by: FAMILY MEDICINE

## 2018-03-12 RX ORDER — NAPROXEN 500 MG/1
500 TABLET ORAL
Qty: 60 TAB | Refills: 1 | Status: SHIPPED | OUTPATIENT
Start: 2018-03-12 | End: 2018-05-22 | Stop reason: SDUPTHER

## 2018-03-12 RX ORDER — INSULIN PUMP SYRINGE, 3 ML
EACH MISCELLANEOUS
Qty: 1 KIT | Refills: 0 | Status: SHIPPED | OUTPATIENT
Start: 2018-03-12 | End: 2019-03-20

## 2018-03-12 NOTE — MR AVS SNAPSHOT
[address] [place] [address] [address] [id number] 
[phone] Patient: Saranya Arana MRN: [id number] :1980 Visit Information Roxy Valles Personal Médico Departamento Teléfono del Dep. Número de visita 3/12/2018  1:35 PM Suzette Sampson, [address] [phone] Follow-up Instructions Return in about 2 months (around 2018). Upcoming Health Maintenance Date Due  
 LIPID PANEL Q1 1980 FOOT EXAM Q1 1989 MICROALBUMIN Q1 1989 Pneumococcal 19-64 Medium Risk (1 of 1 - PPSV23) 1998 Influenza Age 5 to Adult 2017 HEMOGLOBIN A1C Q6M 2018 EYE EXAM RETINAL OR DILATED Q1 10/26/2018 PAP AKA CERVICAL CYTOLOGY 2019 DTaP/Tdap/Td series (2 - Td) 3/25/2027 Alergias  Review Complete El: 3/12/2018 Por: Suzette Sampson MD  
 A partir del:  3/12/2018 No Known Allergies Vacunas actuales Revisadas el:  2018 Peyton Reyes Tdap 3/25/2017  3:58 AM  
  
 No revisadas esta visita You Were Diagnosed With   
  
 Fabiola Coho Type 2 diabetes mellitus with hyperglycemia, without long-term current use of insulin (HCC)    -  Primary ICD-10-CM: E11.65 ICD-9-CM: 250.00, 790.29 Left anterior knee pain     ICD-10-CM: M25.562 ICD-9-CM: 719.46 Elevated TSH     ICD-10-CM: R94.6 ICD-9-CM: 794.5 Infrapatellar bursitis of left knee     ICD-10-CM: M70.52 ICD-9-CM: 726.69 Partes vitales PS Pulso Temperatura Peso (percentil de crecimiento) LMP (última anai) BMI (IMC)  
 122/84 (BP 1 Location: Left arm, BP Patient Position: Sitting) 80 98.2 °F (36.8 °C) (Oral) 198 lb (89.8 kg) 2018 38.67 kg/m2 Estado obstétrico Estatus de tabaquísmo Having regular periods Never Smoker Historial de signos vitales BMI and BSA Data  Body Mass Index Body Surface Area  
 38.67 kg/m 2 1.95 m 2  
  
  
 [facility] Name Phone [address] [place] [address] [phone] Foster lista de medicamentos actualizada Lista actualizada 3/12/18  2:15 PM.  Hermilo Solo use foster lista de medicamentos más reciente. Blood-Glucose Meter monitoring kit Use prn to check blood glucose. Use cuando necesita para revisar foster azucar. metFORMIN  mg tablet También conocido mohan:  GLUCOPHAGE XR Take 4 Tabs by mouth daily (with dinner). [place] 4 pastillas con hong  
  
 naproxen 500 mg tablet También conocido mohan:  NAPROSYN Take 1 Tab by mouth two (2) times daily as needed. [place] 1 tableta dos veces al alfie si necesita para dolor Recetas Enviado a la [place] Refills Blood-Glucose Meter monitoring kit 0 Sig: Use prn to check blood glucose. Use cuando necesita para revisar foster azucar. Class: Normal  
 Pharmacy: [address] [place] [address] Ph #: [phone]  
 naproxen (NAPROSYN) 500 mg tablet 1 Sig: Take 1 Tab by mouth two (2) times daily as needed. [place] 1 tableta dos veces al alfie si necesita para dolor Class: Normal  
 Pharmacy: [address] [place] [address] Ph #: [phone] Route: Oral  
  
Hicimos lo siguiente AMB POC GLUCOSE BLOOD, BY GLUCOSE MONITORING DEVICE [84088 CPT(R)] Instrucciones de seguimiento Return in about 2 months (around 5/12/2018). Por hacer 03/12/2018 Lab:  T4, FREE   
  
 03/12/2018 Lab:  TSH 3RD GENERATION Instrucciones para el Paciente Bursitis: Instrucciones de cuidado - [ Bursitis: Care Instructions ] Instrucciones de cuidado Scott bursa es un pequeño saco de líquido que ayuda a que los tejidos que rodean a scott articulación se deslicen con facilidad el jonelle sobre el [place].  Scott lesión o el uso excesivo de scott articulación pueden causar dolor, enrojecimiento e inflamación en la bursa (bursitis). La bursitis suele mejorar si alonso la actividad que la causó. Puede ayudar a prevenir que la bursitis reaparezca haciendo ejercicios de estiramiento y fortalecimiento. También podría necesitar cambiar la manera de hacer [place]. La atención de seguimiento es scott parte clave de foster tratamiento y seguridad. Asegúrese de hacer y acudir a todas las citas, y llame a foster médico si está teniendo problemas. También es scott buena idea saber los resultados de los exámenes y mantener scott lista de los medicamentos que jessica. Cómo puede cuidarse en el hogar? · Colóquese hielo o scott compresa fría en la georgi fatemeh 10 a 20 minutos cada vez. Trate de hacerlo cada 1 a 2 horas fatemeh los siguientes 3 días (cuando esté despierto) o hasta que la hinchazón baje. Póngase un paño lind entre el hielo y la piel. · Después de 3 días de utilizar hielo, puede aplicar calor en la georgi. Puede utilizar scott bolsa de [place], scott toalla húmeda tibia o scott almohadilla térmica a baja temperatura. También puede tratar de [place] calor y hielo. · Descanse la georgi adolorida. Deje de hacer cualquier actividad que le cause dolor. Cambie a actividades que no reyna esforzar lamont georgi. · [employer] analgésicos (medicamentos para el dolor) exactamente según las indicaciones. ¨ Si el médico le recetó un analgésico, tómelo según las indicaciones. ¨ Si no está tomando un analgésico recetado, pregúntele a foster médico si puede suhail jonelle de [employer]. ¨ No tome dos o más analgésicos al mismo tiempo a menos que el médico se lo haya indicado. Muchos analgésicos contienen acetaminofén, es decir, Tylenol. El exceso de acetaminofén (Tylenol) puede ser dañino. · Para prevenir rigidez, Briana Elie todos los mami la articulación suavemente tanto mohan pueda sin que le duela. A medida que el dolor mejore, siga haciendo ejercicios de amplitud de [place].  Pregúntele a foster médico sobre ejercicios para fortalecer los músculos alrededor de la articulación. Hágalos según las indicaciones. · Lentamente, puede retomar la [employer] causó el Julio Jones con menos esfuerzo hasta que pueda hacerla sin dolor o Larry Chet. Asegúrese de hacer precalentamiento antes de la actividad y de estirarse después de aditya. Cuándo debe pedir ayuda? Llame a foster médico ahora mismo o busque atención médica inmediata si: 
? · El dolor [place]. ? · Presenta nueva o mayor hinchazón en el codo. ? · Tiene fiebre. ? · El codo se le pone monge o el enrojecimiento empeora. ? · Le pusieron scott inyección y tiene sangrado o señales de infección (dolor, hinchazón, enrojecimiento o pus) alrededor del lugar de la inyección. ? · No puede usar scott articulación, o el dolor en scott articulación [place]. ?Preste especial atención a los cambios en foster frederic y asegúrese de comunicarse con foster médico si: 
? · El dolor no ha erick después de 2 [facility]. Dónde puede encontrar más información en inglés? Lisa Proper a http://damion-david.info/. [place] O281 en la búsqueda para aprender más acerca de \"Bursitis: Instrucciones de cuidado - [ Bursitis: Care Instructions ]. \" 
Revisado: 21 marzo, 2017 Versión del contenido: 11.4 © 1005-8722 Healthwise, Incorporated. Las instrucciones de cuidado fueron adaptadas bajo licencia por Good Help Connections (which disclaims liability or warranty for this information). Si usted tiene [place] afección médica o sobre estas instrucciones, siempre pregunte a foster profesional de frederic. [employer]orporated niega toda garantía o responsabilidad por foster uso de esta información. Bursitis de rodilla (prepatelar): Ejercicios - [ Knee (Prepatellar) Bursitis: Exercises ] Instrucciones de cuidado Éstos son algunos ejemplos de ejercicios típicos de rehabilitación para foster afección. Comience cada ejercicio lentamente.  Reduzca la intensidad del ejercicio si Alexandra [place] a sentir dolor. Foster médico o el fisioterapeuta le dirán cuándo puede comenzar con estos ejercicios y cuáles funcionarán mejor para usted. Cómo se hacen los ejercicios Deslizamiento de talón 1. Acuéstese boca arriba con la rodilla afectada recta. Foster rodilla buena debe estar doblada. 2. Flexione la rodilla afectada deslizando el talón sobre el piso y hacia foster nalga hasta que sienta un estiramiento suave en la rodilla. 3. Mantenga la posición fatemeh unos 6 segundos, y luego extienda lentamente foster rodilla. 4. Repita de 8 a 12 veces. Ejercicios para el músculo cuádriceps 1. Siéntese con la pierna afectada estirada y apoyada en el piso o en scott [place]. Coloque scott toalla pequeña enrollada debajo de la rodilla afectada. Foster otra pierna debe estar flexionada, con vinicio pie apoyado en el piso. 2. Apriete los músculos del muslo de la pierna afectada haciendo presión en la toalla con la parte posterior de la rodilla. 3. Mantenga la posición por unos 6 segundos, y luego descanse hasta 10 segundos. 4. Repita de 8 a 12 veces. Elevación de pierna estirada al frente 1. Acuéstese boca arriba con la rodilla buena flexionada de forma que el pie se apoye en el piso. Foster pierna afectada debe estar estirada. Asegúrese de que la parte baja de foster espalda tenga scott curva normal. Debe poder deslizar la mano entre el piso y la parte baja de la espalda, tocando el suelo con la singh de foster mano y foster espalda tocando el dorso de foster [place]. 2. Apriete los músculos del muslo de la pierna afectada haciendo presión con la parte posterior de la rodilla hacia el piso. Mantenga la [place]. 3. Con los músculos del muslo apretados y la pierna estirada, levante la pierna afectada de modo que el talón quede a unas 12 pulgadas (30 cm) del piso. 4. Mantenga la posición fatemeh unos 6 segundos y luego baje lentamente la pierna. Descanse hasta 10 segundos entre repeticiones. 5. Repita de 8 a 12 veces. La atención de seguimiento es scott parte clave de foster tratamiento y seguridad. Asegúrese de hacer y acudir a todas las citas, y llame a foster médico si está teniendo problemas. También es scott buena idea saber los resultados de los exámenes y mantener scott lista de los medicamentos que jessica. Dónde puede encontrar más información en inglés? Rosie Hester a http://damion-david.info/. Amanda Vázquez Q237 en la búsqueda para aprender más acerca de \"Bursitis de rodilla (prepatelar): Ejercicios - [ Knee (Prepatellar) Bursitis: Exercises ]. \" 
Revisado: 21 marzo, 2017 Versión del contenido: 11.4 © 2176-4546 Healthwise, Incorporated. Las instrucciones de cuidado fueron adaptadas bajo licencia por [facility] [employer] (which disclaims liability or warranty for this information). Si usted tiene [place] afección médica o sobre estas instrucciones, siempre pregunte a foster profesional de frederic. Healthwise, Incorporated niega toda garantía o responsabilidad por foster uso de esta información. Bursitis de la rótula: Instrucciones de cuidado - [ Kneecap Bursitis: Care Instructions ] Instrucciones de cuidado La bursitis es la inflamación de la bursa. Scott bursa es un pequeño saco de líquido que amortigua scott articulación y le permite moverse fácilmente. La bursitis de la rótula (bursitis rotuliana) es scott inflamación de la bursa que se encuentra entre la parte frontal de la rótula y la piel. Arrodillarse fatemeh mucho tiempo puede causar bursitis rotuliana, que puede formar un bulto con forma de huevo en el frente de la [place]. La bursitis suele mejorar si alonso la actividad que la causó. Si persiste o empeora a pesar del tratamiento en el hogar, foster médico podría extraerle líquido de la bursa mediante scott aguja. Ritchey podría aliviar el dolor y permitirle al médico saber si hay infección. Si es así, el médico le recetará antibióticos.  Si solo está Jalen, podrían darle scott inyección de corticosteroides para reducir la hinchazón y el dolor. Foster médico podría recomendarle fisioterapia y ejercicios de estiramiento. En pocos casos se necesitará scott cirugía para drenar o extraer la bursa. La atención de seguimiento es scott parte clave de foster tratamiento y seguridad. Asegúrese de hacer y acudir a todas las citas, y llame a foster médico si está teniendo problemas. También es scott buena idea saber los resultados de los exámenes y mantener scott lista de los medicamentos que jessica. Cómo puede cuidarse en el hogar? · Aplíquese hielo o scott compresa fría en la rótula fatemeh 10 a 20 minutos cada vez. Póngase un paño lind entre el hielo y la piel. · Después de 3 días de usar hielo, puede aplicarse calor en la rótula. Puede usar scott bolsa de [place], scott almohadilla térmica ajustada a baja temperatura o scott toalla húmeda tibia. · Eleve la pierna adolorida sobre scott almohada cuando se aplique hielo o en cualquier momento que se siente o acueste fatemeh los 3 días siguientes. Trate de mantenerla por encima del nivel del corazón. [place] ayudará a reducir la hinchazón. · Descanse la rodilla. Deje de hacer cualquier actividad que le cause dolor. Alison actividades que no ejerzan tensión en la rodilla. · [employer] medicamentos exactamente mohan le fueron recetados. Llame a foster médico si savi estar teniendo problemas con foster medicamento. · Pregúntele a foster médico si puede suhail un analgésico (medicamento para el dolor) de venta subha, mohan acetaminofén (Tylenol), ibuprofeno (Advil, Motrin) o naproxeno (Aleve). Sea liza con los medicamentos. Dana y siga todas las instrucciones de la [place]. · Para prevenir y aliviar la bursitis rotuliana en el Tegan Dose [place] y las actividades diarias: ¨ Use rodilleras cuando se arrodille en superficies duras. Evite arrodillarse fatemeh mucho tiempo a la vez. ¨ Fortalezca y estire los músculos de las piernas. ¨ Evite doblar mucho las rodillas. · Lentamente, puede retomar la [employer] causó el Julio Jones con menos esfuerzo hasta que pueda hacerla sin dolor o Jamaal Grace. Asegúrese de hacer precalentamiento antes de la actividad y de estirarse después de aditya. Cuándo debe pedir ayuda? Llame a foster médico ahora mismo o busque atención médica inmediata si: 
? · Tiene fiebre. ? · Tiene mayor hinchazón o enrojecimiento en la georgi de la rodilla. ? · No puede usar la rodilla o el dolor en la [place]. ?Preste especial atención a los cambios en foster frederic y asegúrese de comunicarse con foster médico si: 
? · Tiene dolor fatemeh 2 o más semanas a pesar del tratamiento en el hogar. Dónde puede encontrar más información en inglés? Kt Officer a [email or web address]://damion-david.info/. Treasure Hung U113 en la búsqueda para aprender más acerca de \"Bursitis de la rótula: Instrucciones de cuidado - [ Kneecap Bursitis: Care Instructions ]. \" 
Revisado: 21 marzo, 2017 Versión del contenido: 11.4 © 9438-4457 Healthwise, Incorporated. Las instrucciones de cuidado fueron adaptadas bajo licencia por Good [employer] Connections (which disclaims liability or warranty for this information). Si usted tiene [place] afección médica o sobre estas instrucciones, siempre pregunte a foster profesional de frederic. Healthwise, Incorporated niega toda garantía o responsabilidad por foster uso de esta información. Introducing [facility] SERVICES! [facility] introduce portal paciente MyChart . Ahora se puede acceder a partes de foster expediente médico, enviar por correo electrónico la oficina de foster médico y solicitar renovaciones de medicamentos en línea. En foster navegador de Internet , Renee Risser a [email or web address] Alison joseph en el usuario por Jose Singh? Juaquin ruiz aquí en la sesión Lisy Alvarez. Verá la página de registro [place]. Ingrese foster código de [facility] michael y mohan aparece a continuación. Usted no tendrá que [employer] código después de deandra completado el proceso de registro . Si usted no se inscribe antes de la fecha de caducidad , debe solicitar un nuevo código. · MyChart Código de acceso : [id number] Expires: 5/26/2018  2:54 PM 
 
Ingresa los últimos cuatro dígitos de foster Número de Seguro Social ( xxxx ) y fecha de nacimiento ( dd / mm / aaaa ) mohan se indica y alison clic en Enviar. Usted será llevado a la siguiente página de registro . Crear un ID MyChart . Esta será foster ID de inicio de sesión de MyChart y no puede ser [place] , por lo que pensar en scott que es [place] y fácil de recordar . Crear scott contraseña MyChart . Usted puede cambiar foster contraseña en cualquier momento . Ingrese foster Password Reset de preguntas y Ascenc[place] . [place] se puede utilizar en un momento posterior si usted olvida foster contraseña. Introduzca foster dirección de correo electrónico . Laura Mensah recibirá scott notificación por correo electrónico cuando la nueva información está disponible en MyChart . Alexandr Meo clic en Registrarse. Glory Carmita becki y descargar porciones de foster expediente médico. 
Aliosn clic en el enlace de descarga del menú Resumen para descargar scott copia portátil de foster información médica . Si tiene [employer] , por favor visite la sección de preguntas frecuentes del sitio web MyChart . Recuerde, MyChart NO es que se utilizará para las necesidades urgentes. Para emergencias médicas , llame al 911 . Ahora disponible en foster iPhone y Android ! Por favor proporcione raul resumen de la documentación de cuidado a foster próximo proveedor. Your primary care clinician is listed as NONE. If you have any questions after today's visit, please call [phone].

## 2018-03-12 NOTE — PROGRESS NOTES
Coordination of Care  1. Have you been to the ER, urgent care clinic since your last visit? Hospitalized since your last visit? No    2. Have you seen or consulted any other health care providers outside of the [address] [place] [address] since your last visit? Include any pap smears or colon screening. No    Does the patient need refills?  N/A    Learning Assessment Complete? yes  Results for orders placed or performed in visit on 03/12/18   AMB POC GLUCOSE BLOOD, BY GLUCOSE MONITORING DEVICE   Result Value Ref Range    Glucose  mg/dL

## 2018-03-12 NOTE — PATIENT INSTRUCTIONS
Bursitis: Instrucciones de cuidado - [ Bursitis: Care Instructions ]  Instrucciones de cuidado    Scott bursa es un pequeño saco de líquido que ayuda a que los tejidos que rodean a scott articulación se deslicen con facilidad el jonelle sobre el [place]. Raenell Gutting o el uso excesivo de scott articulación pueden causar dolor, enrojecimiento e inflamación en la bursa (bursitis). La bursitis suele mejorar si alonso la actividad que la causó. Puede ayudar a prevenir que la bursitis reaparezca haciendo ejercicios de estiramiento y fortalecimiento. También podría necesitar cambiar la manera de hacer [place]. La atención de seguimiento es scott parte clave de foster tratamiento y seguridad. Asegúrese de hacer y acudir a todas las citas, y llame a foster médico si está teniendo problemas. También es scott buena idea saber los resultados de los exámenes y mantener scott lista de los medicamentos que jessica. ¿Cómo puede cuidarse en el hogar? · Colóquese hielo o scott compresa fría en la georgi fatemeh 10 a 20 minutos cada vez. Trate de hacerlo cada 1 a 2 horas fatemeh los siguientes 3 días (cuando esté despierto) o hasta que la hinchazón baje. Póngase un paño lind entre el hielo y la piel. · Después de 3 días de utilizar hielo, puede aplicar calor en la georgi. Puede utilizar scott bolsa de [place], scott toalla húmeda tibia o scott almohadilla térmica a baja temperatura. También puede tratar de [facility] calor y hielo. · Descanse la georgi adolorida. Deje de hacer cualquier actividad que le cause dolor. Cambie a actividades que no reyna esforzar lamont georgi. · [employer] analgésicos (medicamentos para el dolor) exactamente según las indicaciones. ¨ Si el médico le recetó un analgésico, tómelo según las indicaciones. ¨ Si no está tomando un analgésico recetado, pregúntele a foster médico si puede suhail jonelle de [employer]. ¨ No tome dos o más analgésicos al mismo tiempo a menos que el médico se lo haya indicado.  Delvis Greco contienen acetaminofén, es decir, Tylenol. El exceso de acetaminofén (Tylenol) puede ser dañino. · Para prevenir rigidez, Diania Ice todos los mami la articulación suavemente tanto mohan pueda sin que le duela. A medida que el dolor mejore, siga haciendo ejercicios de amplitud de [place]. Pregúntele a foster médico sobre ejercicios para fortalecer los músculos alrededor de la articulación. Hágalos según las indicaciones. · Lentamente, puede retomar la [employer] causó el Julio Jones buffy con menos esfuerzo hasta que pueda hacerla sin dolor o Adam Manny. Asegúrese de hacer precalentamiento antes de la actividad y de estirarse después de aditya. ¿Cuándo debe pedir ayuda? Llame a foster médico ahora mismo o busque atención médica inmediata si:  ? · El dolor [place]. ? · Presenta nueva o mayor hinchazón en el codo. ? · Tiene fiebre. ? · El codo se le pone monge o el enrojecimiento empeora. ? · Le pusieron scott inyección y tiene sangrado o señales de infección (dolor, hinchazón, enrojecimiento o pus) alrededor del lugar de la inyección. ? · No puede usar scott articulación, o el dolor en scott articulación [place]. ?Preste especial atención a los cambios en foster frederic y asegúrese de comunicarse con foster médico si:  ? · El dolor no ha erick después de 2 11 [facility]. ¿Dónde puede encontrar más información en inglés? Yanique Begin a http://damion-david.info/. Velton Kawasaki L756 en la búsqueda para aprender más acerca de \"Bursitis: Instrucciones de cuidado - [ Bursitis: Care Instructions ]. \"  Revisado: 21 marzo, 2017  Versión del contenido: 11.4  © 2212-8838 Healthwise, Incorporated. Las instrucciones de cuidado fueron adaptadas bajo licencia por [employer] (which disclaims liability or warranty for this information). Si usted tiene [place] afección médica o sobre estas instrucciones, siempre pregunte a foster profesional de frederic.  Healthwise, Incorporated niega toda garantía o responsabilidad por foster uso de [employer]. Bursitis de rodilla (prepatelar): Ejercicios - [ Knee (Prepatellar) Bursitis: Exercises ]  Instrucciones de cuidado  Éstos son algunos ejemplos de ejercicios típicos de rehabilitación para foster afección. Comience cada ejercicio lentamente. Reduzca la intensidad del ejercicio si Nguyen Block a sentir dolor. Foster médico o el fisioterapeuta le dirán cuándo puede comenzar con estos ejercicios y cuáles funcionarán mejor para usted. Cómo se hacen los ejercicios  Deslizamiento de talón    1. Acuéstese boca arriba con la rodilla afectada recta. Foster rodilla buena debe estar doblada. 2. Flexione la rodilla afectada deslizando el talón sobre el piso y hacia foster nalga hasta que sienta un estiramiento suave en la rodilla. 3. Mantenga la posición fatemeh unos 6 segundos, y luego extienda lentamente foster rodilla. 4. Repita de 8 a 12 veces. Ejercicios para el músculo cuádriceps    1. Siéntese con la pierna afectada estirada y apoyada en el piso o en scott [place]. Coloque scott toalla pequeña enrollada debajo de la rodilla afectada. Foster otra pierna debe estar flexionada, con vinicio pie apoyado en el piso. 2. Apriete los músculos del muslo de la pierna afectada haciendo presión en la toalla con la parte posterior de la rodilla. 3. Mantenga la posición por unos 6 segundos, y luego descanse hasta 10 segundos. 4. Repita de 8 a 12 veces. Elevación de pierna estirada al frente    1. Acuéstese boca arriba con la rodilla buena flexionada de forma que el pie se apoye en el piso. Foster pierna afectada debe estar estirada. Asegúrese de que la parte baja de foster espalda tenga scott curva normal. Debe poder deslizar la mano entre el piso y la parte baja de la espalda, tocando el suelo con la singh de foster mano y foster espalda tocando el dorso de foster [place]. 2. [employer]priete los músculos del muslo de la pierna afectada haciendo presión con la parte posterior de la rodilla hacia el piso. Mantenga la Claire Shack.   3. Con los músculos del muslo apretados y la pierna estirada, levante la pierna afectada de modo que el talón quede a unas 12 pulgadas (30 cm) del piso. 4. Mantenga la posición fatemeh unos 6 segundos y luego baje lentamente la pierna. Descanse hasta 10 segundos entre repeticiones. 5. Repita de 8 a 12 veces. La atención de seguimiento es scott parte clave de foster tratamiento y seguridad. Asegúrese de hacer y acudir a todas las citas, y llame a foster médico si está teniendo problemas. También es csott buena idea saber los resultados de los exámenes y mantener scott lista de los medicamentos que jessica. ¿Dónde puede encontrar más información en inglés? Collie [place] a http://damion-david.info/. Karen Bowera J861 en la búsqueda para aprender más acerca de \"Bursitis de rodilla (prepatelar): Ejercicios - [ Knee (Prepatellar) Bursitis: Exercises ]. \"  Revisado: 21 marzo, 2017  Versión del contenido: 11.4  © 9995-1712 Healthwise, Incorporated. Las instrucciones de cuidado fueron adaptadas bajo licencia por [employer] (which disclaims liability or warranty for this information). Si usted tiene Roper Guffey afección médica o sobre estas instrucciones, siempre pregunte a foster profesional de frederic. Healthwise, Incorporated niega toda garantía o responsabilidad por foster uso de esta información. Bursitis de la rótula: Instrucciones de cuidado - [ Kneecap Bursitis: Care Instructions ]  Instrucciones de cuidado    La bursitis es la inflamación de la bursa. Scott bursa es un pequeño saco de líquido que amortigua scott articulación y le permite moverse fácilmente. La bursitis de la rótula (bursitis rotuliana) es scott inflamación de la bursa que se encuentra entre la parte frontal de la rótula y la piel. Arrodillarse fatemeh mucho tiempo puede causar bursitis rotuliana, que puede formar un bulto con forma de huevo en el frente de la [place]. La bursitis suele mejorar si alonso la actividad que la causó.  Si persiste o [place] a pesar del tratamiento en el hogar, foster médico podría extraerle líquido de la bursa mediante scott aguja. [place] podría aliviar el dolor y permitirle al médico saber si hay infección. Si es así, el médico le recetará antibióticos. Si solo está [place], podrían darle scott inyección de corticosteroides para reducir la hinchazón y el dolor. Foster médico podría recomendarle fisioterapia y ejercicios de estiramiento. En pocos casos se necesitará scott cirugía para drenar o extraer la bursa. La atención de seguimiento es scott parte clave de foster tratamiento y seguridad. Asegúrese de hacer y acudir a todas las citas, y llame a foster médico si está teniendo problemas. También es scott buena idea saber los resultados de los exámenes y mantener scott lista de los medicamentos que jessica. ¿Cómo puede cuidarse en el hogar? · Aplíquese hielo o scott compresa fría en la rótula fatemeh 10 a 20 minutos cada vez. Póngase un paño lind entre el hielo y la piel. · Después de 3 días de usar hielo, puede aplicarse calor en la rótula. Puede usar scott bolsa de [place], scott almohadilla térmica ajustada a baja temperatura o scott toalla húmeda tibia. · Eleve la pierna adolorida sobre scott almohada cuando se aplique hielo o en cualquier momento que se siente o acueste fatemeh los 3 días siguientes. Trate de mantenerla por encima del nivel del corazón. [place] ayudará a reducir la hinchazón. · Descanse la rodilla. Deje de hacer cualquier actividad que le cause dolor. Alison actividades que no ejerzan tensión en la rodilla. · [employer] medicamentos exactamente mohan le fueron recetados. Llame a foster médico si savi estar teniendo problemas con foster medicamento. · Pregúntele a foster médico si puede suhail un analgésico (medicamento para el dolor) de venta subha, mohan acetaminofén (Tylenol), ibuprofeno (Advil, Motrin) o naproxeno (Aleve). Sea liza con los medicamentos. Dana y siga todas las instrucciones de la [place].   · Para prevenir y aliviar la bursitis rotuliana en el [facility] y las actividades diarias:  ¨ Use rodilleras cuando se arrodille en superficies duras. Evite arrodillarse fatemeh mucho tiempo a la vez. ¨ Fortalezca y estire los músculos de las piernas. ¨ Evite doblar mucho las rodillas. · Lentamente, puede retomar la [employer] causó el [facility] buffy con menos esfuerzo hasta que pueda hacerla sin dolor o Kelly Si. Asegúrese de hacer precalentamiento antes de la actividad y de estirarse después de aditya. ¿Cuándo debe pedir ayuda? Llame a foster médico ahora mismo o busque atención médica inmediata si:  ? · Tiene fiebre. ? · Tiene mayor hinchazón o enrojecimiento en la georgi de la rodilla. ? · No puede usar la rodilla o el dolor en la Lonnie Roldan. ?Preste especial atención a los cambios en foster frederic y asegúrese de comunicarse con foster médico si:  ? · Tiene dolor fatemeh 2 o más semanas a pesar del tratamiento en el hogar. ¿Dónde puede encontrar más información en inglés? Jaki Ruiz a http://damion-david.info/. Ferny Miller C912 en la búsqueda para aprender más acerca de \"Bursitis de la rótula: Instrucciones de cuidado - [ Kneecap Bursitis: Care Instructions ]. \"  Revisado: 21 marzo, 2017  Versión del contenido: 11.4  © 2048-4134 Healthwise, Incorporated. Las instrucciones de cuidado fueron adaptadas bajo licencia por [employer] (which disclaims liability or warranty for this information). Si usted tiene [place] afección médica o sobre estas instrucciones, siempre pregunte a foster profesional de frederic. Healthwise, Incorporated niega toda garantía o responsabilidad por foster uso de esta información.

## 2018-03-12 NOTE — PROGRESS NOTES
Cb Musa is a 44 y.o. female    Issues discussed today include:    Chief Complaint   Patient presents with    High Blood Sugar     Elevated blood sugar recheck  +  lab results       1) DMII:  Was previously taking metformin 1000mg daily, then 2 weeks ago increased to 2000mg total daily. Tolerating the med fine, no side effects. Not checking sugar at home. Recent A1c 9.1% ~ 2 weeks ago. Has not yet started checking her sugar. Has questions about meter and strips and the cost. Had GDM and was used to checking her sugar 4 times daily in the past.     2) Elevated TSH: Was high in the past, but that was when she was having a lot of pain in her knee. Today pain is much improved. Still notices it most when taking stairs. 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 Data reviewed or ordered today:       Other problems include:  Patient Active Problem List   Diagnosis Code    Type 2 diabetes mellitus with hyperglycemia, without long-term current use of [facility] E11.65    H/O carpal tunnel repair Z98.890    Left anterior knee pain M25.562       Medications:  Current Outpatient Prescriptions   Medication Sig Dispense Refill    Blood-Glucose Meter monitoring kit Use prn to check blood glucose. Use cuando necesita para revisar foster azucar. 1 Kit 0    naproxen (NAPROSYN) 500 mg tablet Take 1 Tab by mouth two (2) times daily as needed. [place] 1 tableta dos veces al alfie si necesita para dolor 60 Tab 1    metFORMIN ER (GLUCOPHAGE XR) 500 mg tablet Take 4 Tabs by mouth daily (with dinner). [place] 4 pastillas con hong 180 Tab 1       Allergies:  No Known Allergies    LMP:  Patient's last menstrual period was 03/08/2018. Social History     Social History    Marital status: SINGLE     Spouse name: N/A    Number of children: N/A    Years of education: N/A     Occupational History    Not on file.      Social History Main Topics    Smoking status: Never Smoker    Smokeless tobacco: Never Used    Alcohol use No    Drug use: No    Sexual activity: Not on file     Other Topics Concern    Not on file     Social History Narrative       No family history on file. Physical Exam   Visit Vitals    /84 (BP 1 Location: Left arm, BP Patient Position: Sitting)    Pulse 80    Temp 98.2 °F (36.8 °C) (Oral)    Wt 198 lb (89.8 kg)    LMP 03/08/2018    BMI 38.67 kg/m2      BP Readings from Last 3 Encounters:   03/12/18 122/84   02/26/18 134/81   02/25/18 (!) 139/91     Constitutional: Appears well,  No acute distress, Vitals noted  Psychiatric:  Affect normal, Alert and Oriented to person/place/time  Eyes:  Conjunctiva clear, no drainage  ENT:  External ears and nose normal, Mucous membranes moist  Neck:  General inspection normal. Supple. Heart:  Normal HR, Normal S1 and S2,  Regular rhythm. No murmurs, rubs or gallops. Lungs:  Clear to auscultation, good respiratory effort, no wheezes, rales or rhonchi  Skin:  Warm to palpation, without rashes  MSK: Left knee without effusion or deformity, patella tracking normal, FROM, no ligamentous laxity, neg Jade, neg patellar grind, + tenderness over pre and infrapatellar bursas. Lab Results   Component Value Date/Time    Glucose 369 (H) 02/26/2018 11:10 AM    Glucose  03/12/2018 01:38 PM       Assessment/Plan:      ICD-10-CM ICD-9-CM    1. Type 2 diabetes mellitus with hyperglycemia, without long-term current use of insulin ([facility]) E11.65 250.00 AMB POC GLUCOSE BLOOD, BY GLUCOSE MONITORING DEVICE     790.29 Blood-Glucose Meter monitoring kit      LIPID PANEL      MICROALBUMIN, UR, RAND W/ MICROALB/CREAT RATIO   2. Left anterior knee pain M25.562 719.46 naproxen (NAPROSYN) 500 mg tablet   3. Infrapatellar bursitis of left knee M70.52 726.69    4. Elevated TSH R94.6 794.5 TSH 3RD GENERATION      T4, FREE     DMII - now on max metformin, tolerating well.  POC glucose much improved today  - Continue current regimen  - Encouraged to obtain glucometer and strips (handout given re: relative cost of supplies) - rx sent for kit  - Told to check glucose once daily ad various times (not qid) and keep log for f/u appt  - Labs as per above  - Plan on A1c at f/u    Recheck TSH and free T4 today, if abnormal and symptomatic would recommend treating    Knee bursitis - exercises, prn naproxen    Follow-up Disposition:  Return in about 2 months (around 5/12/2018).         Klarissa Vizcarra MD  [address] [place] [address] [employer]

## 2018-03-13 NOTE — PROGRESS NOTES
Malb/Cr , moderate microalbuminuria - will repeat in 3 mo to confirm prior to initiating ACEi  TSH elevated again, free T4 wnl - if patient having hypothyroid sxs, can consider treating subclinical hypothyroidism  Lipid panel notable for elevated TG. Normal TC and LDL. HDL > 40 is heart protective. Continue healthy diet and exercises as discussed at [facility].

## 2018-03-16 PROBLEM — M25.562 LEFT ANTERIOR KNEE PAIN: Status: ACTIVE | Noted: 2018-03-16

## 2018-05-22 ENCOUNTER — OFFICE VISIT (OUTPATIENT)
Dept: FAMILY MEDICINE CLINIC | Age: 38
End: 2018-05-22

## 2018-05-22 VITALS
SYSTOLIC BLOOD PRESSURE: 124 MMHG | HEART RATE: 86 BPM | HEIGHT: 60 IN | BODY MASS INDEX: 39.07 KG/M2 | WEIGHT: 199 LBS | DIASTOLIC BLOOD PRESSURE: 74 MMHG | TEMPERATURE: 98.5 F

## 2018-05-22 DIAGNOSIS — M25.561 RIGHT KNEE PAIN, UNSPECIFIED CHRONICITY: ICD-10-CM

## 2018-05-22 DIAGNOSIS — E66.9 OBESITY (BMI 30-39.9): ICD-10-CM

## 2018-05-22 DIAGNOSIS — Z31.69 PRE-CONCEPTION COUNSELING: ICD-10-CM

## 2018-05-22 DIAGNOSIS — M25.562 LEFT ANTERIOR KNEE PAIN: ICD-10-CM

## 2018-05-22 DIAGNOSIS — M25.532 WRIST PAIN, LEFT: ICD-10-CM

## 2018-05-22 DIAGNOSIS — E11.65 TYPE 2 DIABETES MELLITUS WITH HYPERGLYCEMIA, WITHOUT LONG-TERM CURRENT USE OF INSULIN (HCC): Primary | ICD-10-CM

## 2018-05-22 PROBLEM — E11.21 TYPE 2 DIABETES WITH NEPHROPATHY (HCC): Status: ACTIVE | Noted: 2018-05-22

## 2018-05-22 PROBLEM — E66.01 SEVERE OBESITY (BMI 35.0-39.9) WITH COMORBIDITY (HCC): Status: ACTIVE | Noted: 2018-05-22

## 2018-05-22 LAB — GLUCOSE POC: 389 MG/DL

## 2018-05-22 RX ORDER — NAPROXEN 500 MG/1
500 TABLET ORAL
Qty: 60 TAB | Refills: 1 | Status: SHIPPED | OUTPATIENT
Start: 2018-05-22 | End: 2018-06-29

## 2018-05-22 NOTE — MR AVS SNAPSHOT
[address] [place] [address] [address] [id number] 
[phone] Patient: Robert Holbrook MRN: [id number] :[id number] Visit Information Monetta Gottron y Marion Personal Médico Departamento Teléfono del Dep. Número de visita 2018 10:30 AM Robin Coppola [address], [address] [facility] [phone] [phone] Follow-up Instructions Return in about 4 weeks (around 2018). Upcoming Health Maintenance Date Due  
 FOOT EXAM Q1 1989 Pneumococcal 19-64 Medium Risk (1 of 1 - PPSV23) 1998 Influenza Age 5 to Adult 2018 HEMOGLOBIN A1C Q6M 2018 EYE EXAM RETINAL OR DILATED Q1 10/26/2018 MICROALBUMIN Q1 3/12/2019 LIPID PANEL Q1 3/12/2019 PAP AKA CERVICAL CYTOLOGY 2019 DTaP/Tdap/Td series (2 - Td) 3/25/2027 Alergias  Review Complete El: 2018 Por: Aileen Almonte LPN A partir del:  2018 No Known Allergies Vacunas actuales Revisadas el:  2018 Lee Haile Tdap 3/25/2017  3:58 AM  
  
 No revisadas esta visita You Were Diagnosed With   
  
 Agustina Kaplan Type 2 diabetes mellitus with hyperglycemia, without long-term current use of insulin (HCC)    -  Primary ICD-10-CM: E11.65 ICD-9-CM: 250.00, 790.29 Left anterior knee pain     ICD-10-CM: M25.562 ICD-9-CM: 719.46 Pre-conception counseling     ICD-10-CM: P09.08 ICD-9-CM: V26.49 Obesity (BMI 30-39. 9)     ICD-10-CM: E66.9 ICD-9-CM: 278.00 Right knee pain, unspecified chronicity     ICD-10-CM: M25.561 ICD-9-CM: 719.46 Partes vitales PS Pulso Temperatura Bon Aqua ( percentil de crecimiento) Peso (percentil de crecimiento) LMP (última anai) 124/74 86 98.5 °F (36.9 °C) (Oral) 5' (1.524 m) 199 lb (90.3 kg) 2018 BMI [facility]) Estado obstétrico Estatus de tabaquísmo 38.86 kg/m2 Having regular periods Never Smoker BMI and BSA Data Body Mass Index Body Surface Area  
 38.86 kg/m 2 1.96 m 2 [facility] Laura Bloch [address] [facility] [phone] Harris lista de medicamentos actualizada Lindy Kim actualizada 5/22/18 10:57 AM.  Glenny Gallardo use harris lista de medicamentos más reciente. Blood-Glucose Meter monitoring kit Use prn to check blood glucose. Use cuando necesita para revisar harris azucar. metFORMIN  mg tablet También conocido mohan:  GLUCOPHAGE XR Take 4 Tabs by mouth daily (with dinner). [place] 4 pastillas con hong  
  
 naproxen 500 mg tablet También conocido mohan:  NAPROSYN Take 1 Tab by mouth two (2) times daily as needed. [place] 1 tableta dos veces al alfie si necesita para dolor  
  
 prenatal vit-calcium-iron-fa 29 mg iron- 1 mg Tab tablet También conocido mohan:  PRENATAL PLUS Take 1 Tab by mouth daily. Please provide $4 formulary Recetas Enviado a la Toney Refills  
 naproxen (NAPROSYN) 500 mg tablet 1 Sig: Take 1 Tab by mouth two (2) times daily as needed. [place] 1 tableta dos veces al alfie si necesita para dolor Class: Normal  
 Pharmacy: [facility] [address] [place] [address] Ph #: [phone] Route: Oral  
 prenatal vit-calcium-iron-fa (PRENATAL PLUS) 29 mg iron- 1 mg tab tablet 1 Sig: Take 1 Tab by mouth daily. Please provide $4 formulary Class: Normal  
 Pharmacy: [facility] [address] [place] [address] Ph #: [phone] Route: Oral  
  
Hicimos lo siguiente AMB POC GLUCOSE, QUANTITATIVE, BLOOD [64025 CPT(R)] REFERRAL TO ORTHOPEDIC SURGERY [REF62 Custom] Instrucciones de seguimiento Return in about 4 weeks (around 6/19/2018). Por hacer 05/22/2018 Imaging:  XR KNEE LT MIN 4 V   
  
 05/22/2018 Imaging:  XR KNEE RT MIN 4 V Informacion de [employer]  Codigo de Referencia Referido por Referido a  
  
 [id number] RY STANLEY No disponible Visitas Estado [place] de inicio [place] final  
 1 New Request 5/22/18 5/22/19 Si foster referencia tiene un estado de \"pending review\" o \"denied\" , informacion adicional sera enviada para apoyar el resultado de esta decision. Instrucciones para el Paciente Aprenda sobre un futuro embarazo cuando tiene sobrepeso - [ Learning About Future Pregnancy When You Are Overweight ] Cómo puede planificar un embarazo cuando tiene sobrepeso? La mayoría de las mujeres embarazadas tienen bebés sanos y eso incluye a las mujeres que tienen sobrepeso. Alphonsus Kick mucho sobrepeso o ser obesa de hecho aumenta la probabilidad de tener problemas fatemeh el [place], el [place] de parto y [place]. · El bebé: ¨ Tiene un mayor riesgo de anomalías congénitas, incluyendo anomalías cardíacas o del tubo neural. 
¨ Tiene un mayor riesgo de ser [employer]. [place] puede causar problemas fatmeeh el [place] de parto y [place]ottie. · La madre: ¨ Tiene un riesgo más alto de tener problemas fatemeh el [place], tales mohan presión arterial see, diabetes gestacional y preeclampsia. ¨ Tiene scott mayor probabilidad de tener un parto por cesárea o de vivian a crista antes de [place]. ¨ Tiene un mayor riesgo de aborto espontáneo o muerte fetal. 
Si todavía no está embarazada, ahora es un buen momento de tratar de adelgazar un poco. Bajar quigley solo 5 o 10 libras (2.3 o 4.5 kilos) puede ayudar a reducir foster riesgo de problemas. Cómo puede cuidarse antes del embarazo? Cosas que debe hacer · Geri a un médico o a scott enfermera partera certificada para hacerse un examen. Hable sobre los medicamentos y los suplementos dietéticos que tome. Hable acerca de jerilyn antecedentes médicos o cualquier inquietud que tenga. · Hable con foster médico para saber cuál es foster índice de masa corporal [facility]). [place] puede ayudarle a saber si foster peso está aumentando foster riesgo de problemas de [place]. · Vaya al dentista. Hágase cualquier tratamiento dental que necesite. · Hable con foster médico acerca de si tiene que hacerse pruebas de detección de enfermedades hereditarias (trastornos genéticos). · [place] un multivitamínico o scott vitamina prenatal que contenga ácido fólico todos los días. Esta vitamina B reduce las probabilidades de tener un bebé con scott anomalía congénita. · Lleve un registro de foster ciclo menstrual. [place] le ayudará a saber cuál es el mejor momento para tratar de Sherlynn Coil. Y después de Sherlynn Coil, tendrá información que ayudará a foster médico o a foster partera a calcular la fecha de nacimiento del bebé y a saber cómo está creciendo foster bebé. · Aliméntese en forma saludable. · Dynegy que foster médico o foster partera dicen que [place]. · Squires Sk[place] en forma regular. Un cuerpo anahi le ayudará a [facility] exigencias del [place], [place] parto y la recuperación. · Póngase cualquier vacuna que le recomiende el médico. [place] puede ayudar a prevenir las anomalías congénitas, el aborto espontáneo o la muerte fetal que pueden ser causados por infecciones mohan la [place]. Pregúntele a foster médico cuánto tiempo debería esperar antes de quedar embarazada después de ponerse scott vacuna. Cosas que debe evitar · No tome medicamentos antiinflamatorios no esteroideos (GRCEIA), mohan ibuprofeno o aspirina, david que el Carlos Ventura tome. · Evite la cafeína, las bebidas alcohólicas y las drogas [address] · No fume. Fumar puede dañar a foster bebé. Si necesita ayuda para dejar el hábito, hable con foster médico sobre programas y medicamentos para dejar de fumar. Estos pueden aumentar jerilyn probabilidades de dejar de fumar para siempre. Ival Backers si piensa que puede estar [place]?  
· Puede usar scott prueba casera de embarazo tan pronto mohan el primer día de la primera falta del período menstrual. 
· En cuanto sepa que está embarazada, consulte a foster médico. En foster primera visita prenatal usted obtendrá información de cómo ocuparse de sí misma y de harris bebé en crecimiento. · El embarazo no es el momento de adelgazar. Harris bebé necesita que usted tenga scott alimentación Bhatt. No siga ningún tipo de dieta para adelgazar. · Los entendidos recomiendan que las mujeres con sobrepeso suban entre 15 y 21 libras (6.8 y 9.1 kilos) fatemeh Audrene Apa. Para las mujeres obesas, recomiendan que suban entre 11 y 21 libras (5.0 y 9.1 kilos). Harris médico colaborará con usted para fijar scott meta de peso que sea [place] para usted. Dónde puede encontrar más información en inglés? Julio César Small a [email or web address].info/. Jami Silva D908 en la búsqueda para aprender más acerca de \"Aprenda sobre un futuro embarazo cuando tiene sobrepeso - [ Learning About Future Pregnancy When You Are Overweight ]. \" 
Revisado: 16 marzo, 2017 Versión del contenido: 11.4 © 5916-1478 Healthwise, Incorporated. Las instrucciones de cuidado fueron adaptadas bajo licencia por Good Help Connections (which disclaims liability or warranty for this information). Si usted tiene [place] afección médica o sobre estas instrucciones, siempre pregunte a harris profesional de frederic. Healthwise, Incorporated niega toda garantía o responsabilidad por harris uso de esta información. Introducing [facility]! [facility] introduce portal paciente Aditi . Ahora se puede acceder a partes de harris expediente médico, enviar por correo electrónico la oficina de harris médico y solicitar renovaciones de medicamentos en línea. En harris navegador de Internet , Noralyn [place] a [email or web address] Alison joseph en el usuario por Luz Elena Briscoe? Yogesh ruiz aquí en la sesión Lauran Holstein. Verá la página de registro [place]. Ingrese harris código de [facility] michael y mohan aparece a continuación.  Usted no tendrá que [employer] código después de deandra completado el proceso de registro . Si usted no se inscribe antes de la fecha de caducidad , debe solicitar un nuevo código. · MyChart Código de acceso : [id number] Expires: 5/26/2018  2:54 PM 
 
Ingresa los últimos cuatro dígitos de foster Número de Seguro Social ( xxxx ) y fecha de nacimiento ( dd / mm / aaaa ) mohan se indica y alison clic en Enviar. Usted será llevado a la siguiente página de registro . Crear un ID MyChart . Esta será foster ID de inicio de sesión de MyChart y no puede ser [place] , por lo que pensar en scott que es Gemma Grills y fácil de recordar . Crear scott contraseña MyChart . Usted puede cambiar foster contraseña en cualquier momento . Ingrese foster Password Reset de preguntas y Ascenc[place] . [place] se puede utilizar en un momento posterior si usted olvida foster contraseña. Introduzca foster dirección de correo electrónico . Dearl Hugh recibirá scott notificación por correo electrónico cuando la nueva información está disponible en MyChart . Creed Hipps clic en Registrarse. Vianne Americo becki y descargar porciones de foster expediente médico. 
Alison clic en el enlace de descarga del menú Resumen para descargar scott copia portátil de foster información médica . Si tiene [employer] , por favor visite la sección de preguntas frecuentes del sitio web MyChart . Recuerde, MyChart NO es que se utilizará para las necesidades urgentes. Para emergencias médicas , llame al 911 . Ahora disponible en foster iPhone y Android ! Por favor proporcione raul resumen de la documentación de cuidado a foster próximo proveedor. Your primary care clinician is listed as [facility]. If you have any questions after today's visit, please call [phone].

## 2018-05-22 NOTE — PROGRESS NOTES
Chief Complaint   Patient presents with    Diabetes       Coordination of Care  1. Have you been to the ER, urgent care clinic since your last visit? Hospitalized since your last visit? No    2. Have you seen or consulted any other health care providers outside of the [address] [place] [address] since your last visit? Include any pap smears or colon screening. No    Does the patient need refills? YES    Learning Assessment Complete?  yes

## 2018-05-22 NOTE — PROGRESS NOTES
Assessment/Plan:    Diagnoses and all orders for this visit:    1. Type 2 diabetes mellitus with hyperglycemia, without long-term current use of insulin (HCC)  -     AMB POC GLUCOSE, QUANTITATIVE, BLOOD    2. Left anterior knee pain  -     naproxen (NAPROSYN) 500 mg tablet; Take 1 Tab by mouth two (2) times daily as needed. [place] 1 tableta dos veces al alfie si necesita para dolor  -     REFERRAL TO ORTHOPEDIC SURGERY  -     XR KNEE RT MIN 4 V; Future    3. Pre-conception counseling  -     prenatal vit-calcium-iron-fa (PRENATAL PLUS) 29 mg iron- 1 mg tab tablet; Take 1 Tab by mouth daily. Please provide $4 formulary    4. Obesity (BMI 30-39.9)    5. Right knee pain, unspecified chronicity  -     REFERRAL TO ORTHOPEDIC SURGERY  -     XR KNEE LT MIN 4 V; Future    Left wrist pain: wrist brace, likely developing mild CTS due to occupation as a     Pt would like to have a future pregnancy and has asked me to take out her IUD. Will schedule an appt for this but in the meantime she should start on PNV  [facility] pain, now no change in her sxs for 3 months   Pt had sweet bread before appt this morning and she states \"I know that this raises my blood sugar\", we discussed her recent lab results and how she will need to cut back on fried foods as well as sweet foods    ACE-I not recommended due to desire for pregnancy      Follow-up Disposition:  Return in about 4 weeks (around 6/19/2018). ROGERS Cheney expressed understanding of this plan. An AVS was printed and given to the patient.      ----------------------------------------------------------------------    Chief Complaint   Patient presents with    Diabetes       History of Present Illness:  Pt here for knee pain today. She states that the naprosyn \"did not help\". She states that the pain is worse with climbing stairs. Pain is in both knees now.   She wonders if she has gout but she has not had warmth, swelling or severe pain in either knee so I reassured her that this was very unlikely    She has left wrist pain. She is a . She has to shake out her hand at night. She has the sensation that ants are crawling on her hand. She has not had any injury that she is aware of    She ate sweet bread this morning. She knows that this raises her blood sugar. She does not check her blood sugar at home. She does not have polyuria/polydipsia/ unexplained weight loss or blurry vision or vaginal itching. She is taking her metformin as directed    She has what sounds like a copper T IUD and wants to have it removed. She wants to have future pregnancy. We discussed this today including the need to start PNV 3 months before conception       Past Medical History:   Diagnosis Date    Diabetes ([facility] [address]     GDM (gestational diabetes mellitus)     IUD (intrauterine device) since 2013        Current Outpatient Prescriptions   Medication Sig Dispense Refill    naproxen (NAPROSYN) 500 mg tablet Take 1 Tab by mouth two (2) times daily as needed. [place] 1 tableta dos veces al alfie si necesita para dolor 60 Tab 1    prenatal vit-calcium-iron-fa (PRENATAL PLUS) 29 mg iron- 1 mg tab tablet Take 1 Tab by mouth daily. Please provide $4 formulary 90 Tab 1    metFORMIN ER (GLUCOPHAGE XR) 500 mg tablet Take 4 Tabs by mouth daily (with dinner). [place] 4 pastillas con hong 180 Tab 1    Blood-Glucose Meter monitoring kit Use prn to check blood glucose. Use cuando necesita para revisar foster azucar. 1 Kit 0       No Known Allergies    Social History   Substance Use Topics    Smoking status: Never Smoker    Smokeless tobacco: Never Used    Alcohol use No       History reviewed. No pertinent family history.     Physical Exam:     Visit Vitals    /74    Pulse 86    Temp 98.5 °F (36.9 °C) (Oral)    Ht 5' (1.524 m)    Wt 199 lb (90.3 kg)    LMP 05/06/2018    BMI 38.86 kg/m2       A&Ox3  WDWN NAD  Respirations normal and non labored  Left wrist- no deformity, redness, swelling. She has neg tinnels and neg phalens. She has pain with supination/ pronation  Knees- no swelling, redness, warmth.  Please see last visit for detailed knee exam, no changes today on exam

## 2018-05-22 NOTE — PATIENT INSTRUCTIONS
Aprenda sobre un futuro embarazo cuando tiene sobrepeso - [ Learning About Future Pregnancy When You Are Overweight ]  ¿Cómo puede planificar un embarazo cuando tiene sobrepeso? La mayoría de las mujeres embarazadas tienen bebés sanos y eso incluye a las mujeres que tienen sobrepeso. Mandeep Leyla mucho sobrepeso o ser obesa de hecho aumenta la probabilidad de tener problemas fatemeh el [place], el [place] de parto y Dottie. · El bebé:  ¨ Tiene un mayor riesgo de anomalías congénitas, incluyendo anomalías cardíacas o del tubo neural.  ¨ Tiene un mayor riesgo de ser [employer]. [place] puede causar problemas fatemeh el [place] de parto y Dottie. · La madre:  ¨ Tiene un riesgo más alto de tener problemas fatemeh el [place], tales mohan presión arterial see, diabetes gestacional y preeclampsia. ¨ Tiene scott mayor probabilidad de tener un parto por cesárea o de vivian a crista antes de [place]. ¨ Tiene un mayor riesgo de aborto espontáneo o muerte fetal.  Si todavía no está embarazada, ahora es un buen momento de tratar de adelgazar un poco. Bajar quigley solo 5 o 10 libras (2.3 o 4.5 kilos) puede ayudar a reducir foster riesgo de problemas. ¿Cómo puede cuidarse antes del embarazo? Cosas que debe hacer  · Geri a un médico o a scott enfermera partera certificada para hacerse un examen. Hable sobre los medicamentos y los suplementos dietéticos que tome. Hable acerca de jerilyn antecedentes médicos o cualquier inquietud que tenga. · Hable con foster médico para saber cuál es foster índice de masa corporal [facility]). [place] puede ayudarle a saber si foster peso está aumentando foster riesgo de problemas de [place]. · Vaya al dentista. Hágase cualquier tratamiento dental que necesite. · Hable con foster médico acerca de si tiene que hacerse pruebas de detección de enfermedades hereditarias (trastornos genéticos). · [place] un multivitamínico o scott vitamina prenatal que contenga ácido fólico todos los días.  Esta vitamina B reduce las probabilidades de tener un bebé con scott anomalía congénita. · Lleve un registro de foster ciclo menstrual. [place] le ayudará a saber cuál es el mejor momento para tratar de [facility]. Y después de [facility], tendrá información que ayudará a foster médico o a foster partera a calcular la fecha de nacimiento del bebé y a saber cómo está creciendo foster bebé. · Aliméntese en forma saludable. · Dynegy que foster médico o foster partera dicen que [place]. · [place] en forma regular. Un cuerpo anahi le ayudará a [facility] exigencias del [place], [place] part y la recuperación. · Póngase cualquier vacuna que le recomiende el médico. [place] puede ayudar a prevenir las anomalías congénitas, el aborto espontáneo o la muerte fetal que pueden ser causados por infecciones mohan la [place]. Pregúntele a foster médico cuánto tiempo debería esperar antes de quedar embarazada después de ponerse scott vacuna. Cosas que debe evitar  · No tome medicamentos antiinflamatorios no esteroideos (GRECIA), mohan ibuprofeno o aspirina, david que el médico le diga que los tome. · Evite la cafeína, las bebidas alcohólicas y las drogas [address] · No fume. Fumar puede dañar a foster bebé. Si necesita ayuda para dejar el hábito, hable con foster médico sobre programas y medicamentos para dejar de fumar. Estos pueden aumentar jerilyn probabilidades de dejar de fumar para siempre. ¿Qué sucede si piensa que puede estar [place]? · Puede usar scott prueba casera de embarazo tan pronto mohan el primer día de la primera falta del período menstrual.  · En cuanto sepa que está embarazada, consulte a foster médico. En foster primera visita prenatal usted obtendrá información de cómo ocuparse de sí misma y de foster bebé en crecimiento. · El embarazo no es el momento de adelgazar. Foster bebé necesita que usted tenga scott alimentación Bhatt. No siga ningún tipo de dieta para adelgazar.   · Los entendidos recomiendan que las mujeres con sobrepeso suban entre 15 y 21 libras (6.8 y 9.1 kilos) fatemeh el embarazo. Para las mujeres obesas, recomiendan que suban entre 11 y 21 libras (5.0 y 9.1 kilos). Harris médico colaborará con usted para fijar scott meta de peso que sea [place] para usted. ¿Dónde puede encontrar más información en inglés? Luana Castillo a http://damion-david.info/. Cathryn Pelon Q596 en la búsqueda para aprender más acerca de \"Aprenda sobre un futuro embarazo cuando tiene sobrepeso - [ Learning About Future Pregnancy When You Are Overweight ]. \"  Revisado: 16 marzo, 2017  Versión del contenido: 11.4  © 3814-4922 Healthwise, [employer]. Las instrucciones de cuidado fueron adaptadas bajo licencia por Good [employer] Connections (which disclaims liability or warranty for this information). Si usted tiene [place] afección médica o sobre estas instrucciones, siempre pregunte a harris profesional de frederic. [employer], [employer] niega toda garantía o responsabilidad por harris uso de esta información.

## 2018-05-29 ENCOUNTER — HOSPITAL ENCOUNTER (OUTPATIENT)
Dept: GENERAL RADIOLOGY | Age: 38
Discharge: HOME | End: 2018-05-29
Payer: SUBSIDIZED

## 2018-05-29 DIAGNOSIS — M25.562 LEFT ANTERIOR KNEE PAIN: ICD-10-CM

## 2018-05-29 DIAGNOSIS — M25.561 RIGHT KNEE PAIN, UNSPECIFIED CHRONICITY: ICD-10-CM

## 2018-05-29 PROCEDURE — 73562 X-RAY EXAM OF KNEE 3: CPT

## 2018-06-19 ENCOUNTER — OFFICE VISIT (OUTPATIENT)
Dept: FAMILY MEDICINE CLINIC | Age: 38
End: 2018-06-19

## 2018-06-19 VITALS
HEIGHT: 60 IN | BODY MASS INDEX: 38.72 KG/M2 | WEIGHT: 197.2 LBS | DIASTOLIC BLOOD PRESSURE: 81 MMHG | SYSTOLIC BLOOD PRESSURE: 122 MMHG | HEART RATE: 84 BPM | TEMPERATURE: 98.5 F

## 2018-06-19 DIAGNOSIS — T83.32XA INTRAUTERINE CONTRACEPTIVE DEVICE THREADS LOST, INITIAL ENCOUNTER: Primary | ICD-10-CM

## 2018-06-19 NOTE — PROGRESS NOTES
Assessment/Plan:    Diagnoses and all orders for this visit:    1. Intrauterine contraceptive device threads lost, initial encounter    This is an error, at discharge today, she presented me with the name of the actual sterilization apparatus that she had called \"essure\". I spoke to gyn doctor about this as I am not familiar with it and she states that it is permanent. I explained this to the pt and she seemed to understand. All questions were answered. Follow-up Disposition:  Return if symptoms worsen or fail to improve. ROGERS Voss expressed understanding of this plan. An AVS was printed and given to the patient.      ----------------------------------------------------------------------    Chief Complaint   Patient presents with    Removal Iud        History of Present Illness:  Pt states that she is here for removal of IUD. On exam, there are no strings visible. She remembers having something placed vaginally ((which is c/w the ESSURE procedure). She remembered after the exam that the name of the device is ESSuRe which are coils that are placed in the fallopian tube vaginally that lead to permanent infertility. She had this done in [place] 3 years ago. Prenatal vitamins are not needed      Past Medical History:   Diagnosis Date    Diabetes ([facility] [address]     GDM (gestational diabetes mellitus)     IUD (intrauterine device) since 2013        Current Outpatient Prescriptions   Medication Sig Dispense Refill    naproxen (NAPROSYN) 500 mg tablet Take 1 Tab by mouth two (2) times daily as needed. [place] 1 tableta dos veces al alfie si necesita para dolor 60 Tab 1    prenatal vit-calcium-iron-fa (PRENATAL PLUS) 29 mg iron- 1 mg tab tablet Take 1 Tab by mouth daily. Please provide $4 formulary 90 Tab 1    Arm Brace (WRIST BRACE MEDIUM) misc Use PRN for wrist pain 1 Each 0    Blood-Glucose Meter monitoring kit Use prn to check blood glucose. Use cuando necesita para revisar foster azucar.  1 Kit 0    metFORMIN ER (GLUCOPHAGE XR) 500 mg tablet Take 4 Tabs by mouth daily (with dinner). [place] 4 pastillas con hong 180 Tab 1       No Known Allergies    Social History   Substance Use Topics    Smoking status: Never Smoker    Smokeless tobacco: Never Used    Alcohol use No       No family history on file.     Physical Exam:     Visit Vitals    /81 (BP 1 Location: Left arm)    Pulse 84    Temp 98.5 °F (36.9 °C) (Oral)    Ht 5' (1.524 m)    Wt 197 lb 3.2 oz (89.4 kg)    BMI 38.51 kg/m2       A&Ox3  WDWN NAD  Respirations normal and non labored

## 2018-06-19 NOTE — MR AVS SNAPSHOT
[address] 
 
 
 [address] [facility] [employer] [address] [address] 
[phone] Patient: Duglas Abbott MRN: [id number] :1980 Visit Information Elvi Mortensen Personal Médico Departamento Teléfono del Dep. Número de visita 2018  9:30 AM Garth Figueroa Coppola [address] [place], [place] [phone] [id number] Follow-up Instructions Return if symptoms worsen or fail to improve. Your Appointments 2018 10:10 AM  
ROUTINE CARE with SOULEYMANE Connelly [address] ([facility]) Appt Note: 3 mos  fu diabetes [address] [facility] [employer] [address] [address]  
[phone]  
  
   
 604 [facility] [address]  
  
    
 2018 10:00 AM  
Follow Up with ROGERS Camilo (STACY Tucker) Appt Note: ref  sjoc  mm  
 [address] [facility] [address] [facility] [address]  
[phone]  
  
   
 [address] [facility] [address] [facility] Related Content Database (RCDb) 7 [id number] Upcoming Health Maintenance Date Due  
 FOOT EXAM Q1 1989 Pneumococcal 19-64 Medium Risk (1 of 1 - PPSV23) 1998 Influenza Age 5 to Adult 2018 HEMOGLOBIN A1C Q6M 2018 EYE EXAM RETINAL OR DILATED Q1 10/26/2018 MICROALBUMIN Q1 3/12/2019 LIPID PANEL Q1 3/12/2019 PAP AKA CERVICAL CYTOLOGY 2019 DTaP/Tdap/Td series (2 - Td) 3/25/2027 Alergias  Review Complete El: 2018 Por: Nelida Pleitez LPN A partir del:  2018 No Known Allergies Vacunas actuales Revisadas el:  2018 Cindia Dy Tdap 3/25/2017  3:58 AM  
  
 No revisadas esta visita You Were Diagnosed With   
  
 Howard Bench Intrauterine contraceptive device threads lost, initial encounter    -  Primary ICD-10-CM: C98.91DB ICD-9-CM: 724.10 Partes vitales PS Pulso Temperatura [place] ( percentil de crecimiento) Peso (percentil de crecimiento) [facility]) 122/81 (BP 1 Location: Left arm) 84 98.5 °F (36.9 °C) (Oral) 5' (1.524 m) 197 lb 3.2 oz (89.4 kg) 38.51 kg/m2 Estado obstétrico Estatus de tabaquísmo Having regular periods Never Smoker Historial de signos vitales BMI and BSA Data Body Mass Index Body Surface Area 38.51 kg/m 2 1.95 m 2 [facility] Name Phone [place]a [address] [place] [address] [phone] Harris lista de medicamentos actualizada Francisca Broadus actualizada 6/19/18  9:52 AM.  Puja Murillo use harris lista de medicamentos más reciente. Arm Brace Misc También conocido mohan:  WRIST BRACE MEDIUM Use PRN for wrist pain Blood-Glucose Meter monitoring kit Use prn to check blood glucose. Use cuando necesita para revisar harris azucar. metFORMIN  mg tablet También conocido mohan:  GLUCOPHAGE XR Take 4 Tabs by mouth daily (with dinner). [place] 4 pastillas con hong  
  
 naproxen 500 mg tablet También conocido mohan:  NAPROSYN Take 1 Tab by mouth two (2) times daily as needed. [place] 1 tableta dos veces al alfie si necesita para dolor  
  
 prenatal vit-calcium-iron-fa 29 mg iron- 1 mg Tab tablet También conocido mohan:  PRENATAL PLUS Take 1 Tab by mouth daily. Please provide $4 formulary Hicimos lo siguiente REFERRAL TO GYNECOLOGY [REF30 Custom] Comentarios: Access Now Referral Request Form: 
 
Has the patient live in the area for 6 months y Is the patient here on a visa n 
 
Priority: 
 
2 weeks Location: [facility] Patient Demographics: 
 
Date:  6/19/2018                          EMR# [id number] Becky Kevin 1980 Home Phone : [phone]             Cell Phone:   
 
Language Mongolian Specialist Requested:  gyn Reason for Request:  Pt desires to have IUD out but strings are not present so unable to remove in clinic Specialist Requirements: 
 
If GYN Referral: Pap: yes Referring Provider: SOULEYMANE Alvarez Instrucciones de seguimiento Return if symptoms worsen or fail to improve. Informacion de [employer] de Referencia Referido por Referido a  
  
 [id number] RY STANLEY No disponible Visitas Estado Kristopher Shipley de inicio Kristopher Shipley final  
 1 New Request 6/19/18 6/19/19 Si foster referencia tiene un estado de \"pending review\" o \"denied\" , informacion adicional sera enviada para apoyar el resultado de esta decision. Introducing [facility] [place] [facility]! [facility] introduce portal paciente MyChart . Ahora se puede acceder a partes de foster expediente médico, enviar por correo electrónico la oficina de foster médico y solicitar renovaciones de medicamentos en línea. En foster navegador de Internet , Kennedi Nations a [email or web address] Cuca clic en el usuario por Esther Joao? Caitlyn ruiz aquí en la sesión [facility]. Verá la página de registro [place]. Ingrese foster código de [facility] of Jaylin michael y mohan aparece a continuación. Usted no tendrá que [employer] código después de deandra completado el proceso de registro . Si usted no se inscribe antes de la fecha de caducidad , debe solicitar un nuevo código. · MyChart Código de acceso : [id number] Expires: 9/17/2018  9:52 AM 
 
Ingresa los últimos cuatro dígitos de foster Número de Seguro Social ( xxxx ) y fecha de nacimiento ( dd / mm / aaaa ) mohan se indica y cuca clic en Enviar. Usted será llevado a la siguiente página de registro . Crear un ID MyChart . Esta será foster ID de inicio de sesión de MyChart y no puede ser [place] , por lo que pensar en scott que es Arville Schlossman y fácil de recordar . Crear scott contraseña MyChart . Usted puede cambiar foster contraseña en cualquier momento . Ingrese foster Password Reset de preguntas y Ascencio . Neeses se puede utilizar en un momento posterior si usted olvida foster contraseña.  
Introduzca foster dirección de correo electrónico . Lin Winchester recibirá Fina De Leon notificación por correo electrónico cuando la nueva información está disponible en MyChart . Nicolas Costello clic en Registrarse. Ben Nurse becki y descargar porciones de foster expediente médico. 
Alison clic en el enlace de descarga del menú Resumen para descargar scott copia portátil de foster información médica . Si tiene [employer] , por favor visite la sección de preguntas frecuentes del sitio web MyCCartavit . Recuerde, [employer] NO es que se utilizará para las necesidades urgentes. Para emergencias médicas , llame al 911 . Ahora disponible en foster iPhone y Android ! Por favor proporcione raul resumen de la documentación de cuidado a foster próximo proveedor. Your primary care clinician is listed as Leo Ware. If you have any questions after today's visit, please call [phone].

## 2018-06-19 NOTE — PROGRESS NOTES
Coordination of Care  1. Have you been to the ER, urgent care clinic since your last visit? Hospitalized since your last visit?

## 2018-06-29 ENCOUNTER — OFFICE VISIT (OUTPATIENT)
Dept: FAMILY MEDICINE CLINIC | Age: 38
End: 2018-06-29

## 2018-06-29 VITALS
WEIGHT: 198 LBS | TEMPERATURE: 98.6 F | HEART RATE: 87 BPM | SYSTOLIC BLOOD PRESSURE: 118 MMHG | BODY MASS INDEX: 38.87 KG/M2 | DIASTOLIC BLOOD PRESSURE: 81 MMHG | HEIGHT: 60 IN

## 2018-06-29 DIAGNOSIS — M17.0 OSTEOARTHRITIS OF BOTH KNEES, UNSPECIFIED OSTEOARTHRITIS TYPE: Primary | ICD-10-CM

## 2018-06-29 RX ORDER — DICLOFENAC SODIUM 50 MG/1
50 TABLET, DELAYED RELEASE ORAL 2 TIMES DAILY
Qty: 28 TAB | Refills: 0 | Status: SHIPPED | OUTPATIENT
Start: 2018-06-29 | End: 2019-10-31

## 2018-06-29 NOTE — PATIENT INSTRUCTIONS
Artritis en la rodilla: Instrucciones de cuidado - [ Knee Arthritis: Care Instructions ]  Instrucciones de cuidado    La artritis en la rodilla es un desgaste del cartílago que amortigua la articulación de foster rodilla. Cuando el cartílago se desgasta, los huesos se rozan. [place] causa dolor y rigidez. La artritis en la rodilla tiende a empeorar con el tiempo. El tratamiento para la artritis en la rodilla incluye reducir el dolor, fortalecer los músculos de la pierna y mantener un peso corporal saludable. El tratamiento generalmente no mejora la condición del cartílago, reno puede disminuir el dolor y mejorar el funcionamiento de la rodilla. Usted puede suhail medidas simples para proteger las articulaciones de foster Dozier Mercury dolor y ayudarle a mantenerse activo. La atención de seguimiento es scott parte clave de foster tratamiento y seguridad. Asegúrese de hacer y acudir a todas las citas, y llame a foster médico si está teniendo problemas. También es scott buena idea saber los resultados de los exámenes y mantener scott lista de los medicamentos que jessica. ¿Cómo puede cuidarse en el hogar? · Tenga en cuenta que la artritis en la rodilla le provocará más dolor unos días que otros. · Mantenga un peso saludable. Baje de peso si tiene sobrepeso. Al pararse, la presión que ejerce cada carlene de peso sobre las rodillas se multiplica cuatro veces. Así es que si baja 10 libras (4.5 kg), reducirá 40 libras (18 kg) de presión en jerilyn rodillas. · Hable con foster médico o fisioterapeuta acerca de los ejercicios que lo ayudarán a aliviar el dolor de la articulación. ¨ Antes de hacer ejercicio, cuca estiramientos para ayudar a prevenir la rigidez y evitar lesiones. Es posible que disfrute las formas suaves de yoga para ayudar a mantener las articulaciones y los músculos de la rodilla flexibles. ¨ Camine en lugar de correr. ¨ Kedar en bicicleta. [place] fortalece los músculos del muslo y [place] presión de foster Kip Francie.   ¨ Use zapatos que le queden keanu y amada cómodos. ¨ Alison ejercicio en agua que le llegue hasta el pecho. [place] puede ayudarle a hacer ejercicio fatemeh más tiempo con menos dolor. ¨ Evite ejercicios que incluyan ponerse en cuclillas o de rodillas. Estos pueden ejercer mucho esfuerzo en jerilyn rodillas. ¨ Hable con foster médico para asegurarse de que el ejercicio que hace no esté empeorando la artritis. · No se siente por largos períodos de tiempo. Trate de caminar de vez en cuando para evitar que jerilyn rodillas se pongan rígidas. · Pregúntele a foster médico o fisioterapeuta si las plantillas para zapatos pueden disminuir el dolor de la artritis. · Si tiene los [place], adquiera calzado deportivo al menos scott vez al [place] Curet. [place] puede ayudar a disminuir el esfuerzo en jerilyn rodillas. · Utilice un dispositivo para ayudarlo a [employer]. ¨ Un bastón puede ayudarlo a mantener el equilibrio al [employer]. Sostenga el bastón con la mano opuesta a la rodilla adolorida. ¨ Si siente que se puede caer al caminar, trate de utilizar muletas o un andador con shaun frontales. Estos pueden prevenir caídas que pudieran provocar más daño a foster rodilla. ¨ Un soporte ortopédico para la rodilla puede ayudarlo a mantener foster rodilla estable y a evitar el dolor. ¨ También puede utilizar otras cosas para facilitarse la bell, mohan un asiento más alto para el excusado y pasamanos en la ganesh o en la ducha. · [employer] analgésicos (medicamentos para el dolor) exactamente según las indicaciones. ¨ No espere hasta que el dolor sea intenso. Obtendrá mejores resultados si los jessica más temprano. ¨ Si no está tomando un analgésico recetado, tome jonelle de venta subha, michael mohan acetaminofén (Tylenol), ibuprofeno (Advil, Motrin) o naproxeno (Aleve). Dana y siga todas las instrucciones de la [place]. ¨ No tome dos o más analgésicos al [employer], a menos que el médico se lo haya indicado. Muchos analgésicos contienen acetaminofén, es decir, Tylenol. El exceso de acetaminofén (Tylenol) puede ser dañino. ¨ Dígale a foster médico si está tomando un anticoagulante, si tiene diabetes o si tiene [place] a los [facility]. · Pregúntele a foster médico si pudiera beneficiarse de scott inyección de esteroides en la rodilla. [place] podría aliviar el dolor fatemeh varios meses. · Muchas personas ariadan suplementos de glucosamina y condroitina para la osteoartritis. Algunas personas creen que son de [facility], reno las investigaciones médicas no muestran que funcionen. Hable con foster médico antes de suhail estos suplementos. ¿Cuándo debe pedir ayuda? Llame a foster médico ahora mismo o busque atención médica inmediata si:  ? · Laverda Macadam, calor o dolor repentinos en la rodilla. ? · Tiene dolor en la rodilla y fiebre o salpullido. ? · El dolor es tan anahi que no puede usar la rodilla. ?Preste especial atención a los cambios en foster frederic y asegúrese de comunicarse con foster médico si tiene algún problema. ¿Dónde puede encontrar más información en inglés? Natan Abraham a http://damion-david.info/. Tiffany Fails S408 en la búsqueda para aprender más acerca de \"Artritis en la rodilla: Instrucciones de cuidado - [ Knee Arthritis: Care Instructions ]. \"  Revisado: 31 octubre, 2016  Versión del contenido: 11.4  © 7261-5058 Healthwise, Incorporated. Las instrucciones de cuidado fueron adaptadas bajo licencia por Good Help Connections (which disclaims liability or warranty for this information). Si usted tiene [place] afección médica o sobre estas instrucciones, siempre pregunte a foster profesional de frederic. Healthwise, Incorporated niega toda garantía o responsabilidad por foster uso de esta información. Artritis de rodilla: Ejercicios - [ Knee Arthritis: Exercises ]  Instrucciones de cuidado  Éstos son [place]  de ejercicios para la artritis de rodilla. Comience cada ejercicio lentamente. Reduzca la intensidad del ejercicio si Hleena Record a sentir dolor.   [address] [facility] [address] fisioterapeuta le dirán cuándo puede comenzar con estos ejercicios y cuáles funcionarán mejor para usted. Cómo se hacen los ejercicios  Flexión de la rodilla con deslizamiento del talón    1. Acuéstese boca arriba con las rodillas flexionadas. 2. Deslice el talón hacia atrás flexionando la rodilla afectada hasta donde le sea posible. Luego enganche el otro pie alrededor del tobillo para ayudar a jalar el talón aún más atrás. 3. Mantenga la posición aproximadamente por 6 segundos, luego descanse un barbara de 10 segundos. 4. Repita de 8 a 12 veces. 5. Cambie de piernas y repita los pasos del 1 al 4, aunque sólo tenga adolorida scott rodilla. Ejercicios para el músculo cuádriceps    1. Siéntese con la pierna afectada estirada y apoyada en el piso o en scott [place] bridge. Coloque scott toalla pequeña enrollada debajo de foster rodilla. Foster otra pierna debe estar flexionada, con vinicio pie apoyado en el piso. 2. Apriete los músculos del muslo de la pierna afectada haciendo presión en la toalla con la parte posterior de la rodilla. 3. Mantenga la posición aproximadamente por 6 segundos, luego descanse un barbara de 10 segundos. 4. Repita de 8 a 12 veces. 5. Cambie de piernas y repita los pasos del 1 al 4, aunque sólo tenga adolorida scott rodilla. Elevación de pierna estirada al frente    1. Acuéstese boca arriba con la rodilla buena flexionada de forma que el pie se apoye en el piso. Foster pierna afectada debe estar estirada. Asegúrese de que la parte baja de foster espalda tenga scott curva normal. Debe poder deslizar la mano entre el piso y la parte baja de la espalda, tocando el suelo con la singh de foster mano y foster espalda tocando el dorso de foster [place]. 2. Apriete los músculos del muslo de la pierna afectada haciendo presión con la parte posterior de la rodilla hacia el piso. Mantenga la Fiona [place]le[place].   3. Con los músculos del muslo apretados y la pierna estirada, levante la pierna afectada de modo que el talón quede a aproximadamente 12 pulgadas (30 cm) del piso. Mantenga la posición fatemeh unos 6 segundos y luego baje lentamente la pierna. 4. Relájese fatemeh un barbara de 10 segundos entre repeticiones. 5. Repita de 8 a 12 veces. 6. Cambie de piernas y repita los pasos del 1 al 5, aunque sólo tenga adolorida scott rodilla. Flexión activa de la rodilla    1. Acuéstese boca abajo con las rodillas rectas. Si siente molestia en la rótula, enrolle un paño y póngalo debajo de la pierna mk por encima de la [place]. 2. [place] el pie de la pierna afectada flexionando la rodilla de manera que lleve el pie hacia foster nalga (glúteo). Si raul movimiento duele, pruebe sin doblar tanto la rodilla. [place] puede ayudarle a evitar algún movimiento doloroso. 3. Mueva lentamente la pierna hacia arriba y [place]. 4. Repita de 8 a 12 veces. 5. Cambie de piernas y repita los pasos del 1 al 4, aunque sólo tenga adolorida scott rodilla. Estiramiento del cuádriceps (boca abajo)    1. Acuéstese boca abajo y apoye foster mariluz en el piso. 2. Enrolle scott toalla o scott richardson alrededor de la parte inferior de foster pierna afectada. Luego use la toalla o la richardson para jalar lentamente el talón hacia foster nalga hasta que sienta un estiramiento. 3. Mantenga la posición fatemeh unos 15 a 30 segundos, luego relaje foster pierna apoyándola en la toalla o en la richardson. 4. Repita de 2 a 4 veces. 5. Cambie de piernas y repita los pasos del 1 al 4, aunque sólo tenga adolorida scott rodilla. Ejercicio con bicicleta fija    1. Si no tiene usted scott bicicleta fija en casa, puede encontrar scott en foster gimnasio local o en el centro comunitario. 2. Ajuste la altura del asiento de la bicicleta para que foster rodilla quede ligeramente flexionada cuando extienda la pierna hacia abajo. Si le duele la rodilla cuando el pedal llega hasta arriba, puede elevar el asiento para que foster rodilla no se flexione tanto.   3. Empiece poco a poco. Al principio, trate de hacer de 5 a 10 minutos de bicicleta con poca o ninguna resistencia. Luego aumente el tiempo y la resistencia poco a poco hasta que pueda hacer de 20 a 30 minutos sin dolor. 4. Si comienza a sentir dolor, descanse la rodilla hasta que el dolor vuelva al nivel que sea normal para usted. O monte en bicicleta menos tiempo o con menos esfuerzo. La atención de seguimiento es scott parte clave de foster tratamiento y seguridad. Asegúrese de hacer y acudir a todas las citas, y llame a foster médico si está teniendo problemas. También es scott buena idea saber los resultados de los exámenes y mantener scott lista de los medicamentos que jessica. ¿Dónde puede encontrar más información en inglés? Luana Castillo a http://damion-david.info/. Escriba C159 en la búsqueda para aprender más acerca de \"Artritis de rodilla: Ejercicios - [ Knee Arthritis: Exercises ]. \"  Revisado: 21 marzo, 2017  Versión del contenido: 11.4  © 3483-9958 Healthwise, Incorporated. Las instrucciones de cuidado fueron adaptadas bajo licencia por Good Help Connections (which disclaims liability or warranty for this information). Si usted tiene [place] afección médica o sobre estas instrucciones, siempre pregunte a foster profesional de frederic. Healthwise, Incorporated niega toda garantía o responsabilidad por foster uso de esta información.

## 2018-06-29 NOTE — PROGRESS NOTES
ORTHO CONSULT    Subjective:     Date of Consultation:  June 29, 2018    Referring Physician: ROGERS Laguna Yary Marcus is a 43 y.o. female with c/o Bilateral knee pain, R>L, for over a year. Pain waxes and wanes. Improved with naprosyn but has worsened in the RLE over the last month without relief from naprosyn. Denies trauma. Hx of OA with Mother. Pt. Is known Diabetic and obese. Denies NT. 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 Patient Active Problem List    Diagnosis Date Noted    Type 2 diabetes with nephropathy [facility] 05/22/2018    Severe obesity (BMI 35.0-39. 9) with comorbidity ([facility] 05/22/2018    Left anterior knee pain 03/16/2018    H/O carpal tunnel repair 09/28/2017    Type 2 diabetes mellitus with hyperglycemia, without long-term current use of insulin ([facility] [address] 08/06/2017     No family history on file. Social History   Substance Use Topics    Smoking status: Never Smoker    Smokeless tobacco: Never Used    Alcohol use No     Past Medical History:   Diagnosis Date    Diabetes ([address]     GDM (gestational diabetes mellitus)     IUD (intrauterine device) since 2013       Past Surgical History:   Procedure Laterality Date    HX APPENDECTOMY        Prior to Admission medications    Medication Sig Start Date End Date Taking? Authorizing Provider   diclofenac EC (VOLTAREN) 50 mg EC tablet Take 1 Tab by mouth two (2) times a day. Indications: OSTEOARTHRITIS 6/29/18  Yes James Santoro PA-C   prenatal vit-calcium-iron-fa (PRENATAL PLUS) 29 mg iron- 1 mg tab tablet Take 1 Tab by mouth daily. Please provide $4 formulary 5/22/18   SOULEYMANE Bearden   Arm Brace (WRIST BRACE MEDIUM) [facility] Use PRN for wrist pain 5/22/18   SOULEYMANE Card   Blood-Glucose Meter monitoring kit Use prn to check blood glucose. Use cuando necesita para revisar foster azucar. 3/12/18   Pamella Melvin MD   metFORMIN ER (GLUCOPHAGE XR) 500 mg tablet Take 4 Tabs by mouth daily (with dinner).  [place] 4 noemy bates hong 2/26/18   SOULEYMANE Patton     Current Outpatient Prescriptions   Medication Sig    diclofenac EC (VOLTAREN) 50 mg EC tablet Take 1 Tab by mouth two (2) times a day. Indications: OSTEOARTHRITIS    prenatal vit-calcium-iron-fa (PRENATAL PLUS) 29 mg iron- 1 mg tab tablet Take 1 Tab by mouth daily. Please provide $4 formulary    Arm Brace (WRIST BRACE MEDIUM) misc Use PRN for wrist pain    Blood-Glucose Meter monitoring kit Use prn to check blood glucose. Use cuando necesita para revisar foster azucar.  metFORMIN ER (GLUCOPHAGE XR) 500 mg tablet Take 4 Tabs by mouth daily (with dinner). [place] 4 pastillas con hong     No current facility-administered medications for this visit. No Known Allergies     Review of Systems:  A comprehensive review of systems was negative except for that written in the HPI. Objective:     Visit Vitals    /81 (BP 1 Location: Right arm)    Pulse 87    Temp 98.6 °F (37 °C) (Oral)    Ht 5' (1.524 m)    Wt 198 lb (89.8 kg)    LMP 06/01/2018    BMI 38.67 kg/m2       EXAM: I examined pt's R knee. Hyper extension, Full flexion, no ligamentous laxity with valgus and varus stress. No laxity with Anterior or Posterior drawer test. Negative Homans sign. Negative Lachmans sign, Negative McMurrays sign. Medial line tenderness to palpation. No erythema or effusion noted, although difficult to assess due to pt's body habitus. NVI distally. I examined pt's L knee. Hyper extension, Full flexion, no ligamentous laxity with valgus and varus stress. No laxity with Anterior or Posterior drawer test. Negative Homans sign. Negative Lachmans sign, Negative McMurrays sign. Medial line tenderness to palpation. No erythema or effusion noted, although difficult to assess due to pt's body habitus. NVI distally.     XR Results (most recent):    Results from Hospital Encounter encounter on 05/29/18   XR KNEE RT 3 V   Narrative EXAM:  XR KNEE RT 3 V  INDICATION: Right knee pain for 3 months. COMPARISON: None. FINDINGS: Three views of the right knee demonstrate no fracture, effusion or  other acute osseous or articular abnormality. The soft tissues are within normal  limits. Impression IMPRESSION: Normal right knee. EXAM:  XR KNEE LT 3 V  INDICATION: Left knee pain for 3 months. COMPARISON: 2/25/2018.     FINDINGS: Three views of the left knee demonstrate no fracture, effusion or  other acute osseous or articular abnormality. The soft tissues are within normal  limits.     IMPRESSION  IMPRESSION: Normal left knee.          Assessment/Plan:     I reviewed Bilateral Knee x-rays. Moderate narrowing of medial joint space Bilateral with laying xrays. Bilateral Osteoarthritis:  Diclofenac 50mg BID for 14 days. If not improving or worsening we discussed a one time cortisone injection of the knees, one at a time due to hx of DM. Pt. Will f/u with me next month if not improving. She was given exercises to help strenghthen quads and instructed on weight loss. Thank you for allowing us to take part in this patients care.           Jaelyn Espitia PA-C    Orthopaedic Surgery PA

## 2018-08-14 ENCOUNTER — OFFICE VISIT (OUTPATIENT)
Dept: FAMILY MEDICINE CLINIC | Age: 38
End: 2018-08-14

## 2018-08-14 ENCOUNTER — HOSPITAL ENCOUNTER (OUTPATIENT)
Dept: LAB | Age: 38
Discharge: HOME | End: 2018-08-14

## 2018-08-14 VITALS
SYSTOLIC BLOOD PRESSURE: 120 MMHG | WEIGHT: 197 LBS | DIASTOLIC BLOOD PRESSURE: 82 MMHG | BODY MASS INDEX: 38.47 KG/M2 | TEMPERATURE: 98.4 F | HEART RATE: 88 BPM

## 2018-08-14 DIAGNOSIS — M25.562 ACUTE PAIN OF LEFT KNEE: ICD-10-CM

## 2018-08-14 DIAGNOSIS — E11.65 TYPE 2 DIABETES MELLITUS WITH HYPERGLYCEMIA, WITHOUT LONG-TERM CURRENT USE OF INSULIN (HCC): Primary | ICD-10-CM

## 2018-08-14 DIAGNOSIS — B96.89 BV (BACTERIAL VAGINOSIS): ICD-10-CM

## 2018-08-14 DIAGNOSIS — E11.65 TYPE 2 DIABETES MELLITUS WITH HYPERGLYCEMIA, WITHOUT LONG-TERM CURRENT USE OF INSULIN (HCC): ICD-10-CM

## 2018-08-14 DIAGNOSIS — N76.0 BV (BACTERIAL VAGINOSIS): ICD-10-CM

## 2018-08-14 LAB
EST. AVERAGE GLUCOSE BLD GHB EST-MCNC: 243 MG/DL
GLUCOSE POC: NORMAL MG/DL
HBA1C MFR BLD: 10.1 % (ref 4.2–6.3)

## 2018-08-14 PROCEDURE — 83036 HEMOGLOBIN GLYCOSYLATED A1C: CPT | Performed by: PHYSICIAN ASSISTANT

## 2018-08-14 RX ORDER — METRONIDAZOLE 500 MG/1
500 TABLET ORAL 2 TIMES DAILY
Qty: 14 TAB | Refills: 0 | Status: SHIPPED | OUTPATIENT
Start: 2018-08-14 | End: 2019-10-31

## 2018-08-14 NOTE — Clinical Note
Pt needs to have a f/up with Mathieu Navarrete and the  doesn't know how to make the appt.  Please make appt for her

## 2018-08-14 NOTE — PATIENT INSTRUCTIONS
Bacterial Vaginosis: Care Instructions  Your Care Instructions    Bacterial vaginosis is a type of vaginal infection. It is caused by excess growth of certain bacteria that are normally found in the vagina. Symptoms can include itching, swelling, pain when you urinate or have sex, and a gray or yellow discharge with a \"fishy\" odor. It is not considered an infection that is spread through sexual contact. Although symptoms can be annoying and uncomfortable, bacterial vaginosis does not usually cause other health problems. However, if you have it while you are pregnant, it can cause complications. While the infection may go away on its own, most doctors use antibiotics to treat it. You may have been prescribed pills or vaginal cream. With treatment, bacterial vaginosis usually clears up in 5 to 7 days. Follow-up care is a key part of your treatment and safety. Be sure to make and go to all appointments, and call your doctor if you are having problems. It's also a good idea to know your test results and keep a list of the medicines you take. How can you care for yourself at home? · Take your antibiotics as directed. Do not stop taking them just because you feel better. You need to take the full course of antibiotics. · Do not eat or drink anything that contains alcohol if you are taking metronidazole (Flagyl). · Keep using your medicine if you start your period. Use pads instead of tampons while using a vaginal cream or suppository. Tampons can absorb the medicine. · Wear loose cotton clothing. Do not wear nylon and other materials that hold body heat and moisture close to the skin. · Do not scratch. Relieve itching with a cold pack or a cool bath. · Do not wash your vaginal area more than once a day. Use plain water or a mild, unscented soap. Do not douche. When should you call for help?   Watch closely for changes in your health, and be sure to contact your doctor if:    · You have unexpected vaginal bleeding.     · You have a fever.     · You have new or increased pain in your vagina or pelvis.     · You are not getting better after 1 week.     · Your symptoms return after you finish the course of your medicine. Where can you learn more? Go to http://damion-david.info/. Miles Vidal in the search box to learn more about \"Bacterial Vaginosis: Care Instructions. \"  Current as of: October 6, 2017  Content Version: 11.7  © 6596-4598 [employer]. Care instructions adapted under license by [employer] (which disclaims liability or warranty for this information). If you have questions about a medical condition or this instruction, always ask your healthcare professional. [address] any warranty or liability for your use of this information.

## 2018-08-14 NOTE — PROGRESS NOTES
Coordination of Care  1. Have you been to the ER, urgent care clinic since your last visit? Hospitalized since your last visit? No    2. Have you seen or consulted any other health care providers outside of the [address] [place] [address] since your last visit? Include any pap smears or colon screening. No    Does the patient need refills?  YES    Learning Assessment Complete? yes    Results for orders placed or performed in visit on 08/14/18   AMB POC GLUCOSE BLOOD, BY GLUCOSE MONITORING DEVICE   Result Value Ref Range    Glucose  NF mg/dL

## 2018-08-14 NOTE — PROGRESS NOTES
Assessment/Plan:    Diagnoses and all orders for this visit:    1. Type 2 diabetes mellitus with hyperglycemia, without long-term current use of insulin ([facility])  -     AMB POC GLUCOSE BLOOD, BY GLUCOSE MONITORING DEVICE  -     HEMOGLOBIN A1C WITH EAG; Future    2. Acute pain of left knee  -     REFERRAL TO ORTHOPEDIC SURGERY    3. BV (bacterial vaginosis)  -     metroNIDAZOLE (FLAGYL) 500 mg tablet; Take 1 Tab by mouth two (2) times a day. Follow-up Disposition:  Return in about 3 months (around 11/14/2018). ROGERS Chavez expressed understanding of this plan. An AVS was printed and given to the patient.      ----------------------------------------------------------------------    Chief Complaint   Patient presents with    Irregular Menses     Irregular sicles    Knee Pain     Left knee pain recheck       History of Present Illness:  Not taking metformin daily. Does not check her blood sugar. Takes metformin about 3-4 times a week \"and forgets it\". Drinking 2 huge glasses of lemonade every day at work ([employer]) then buys gatorade for home. We have discussed this before but she thought that these were good drinks for her. No blurry vision, vaginal itching, polyuria or polydipsia    She has foul smelling vaginal discharge. No pelvic pain. She has essure procedure so pregnancy is not an issue. Her periods have been irregular the past 2 months, 2 times in July and the last one was 3 weeks ago and was [employer]"    Past Medical History:   Diagnosis Date    Diabetes ([facility] [address]     GDM (gestational diabetes mellitus)     IUD (intrauterine device) since 2013        Current Outpatient Prescriptions   Medication Sig Dispense Refill    metroNIDAZOLE (FLAGYL) 500 mg tablet Take 1 Tab by mouth two (2) times a day. 14 Tab 0    diclofenac EC (VOLTAREN) 50 mg EC tablet Take 1 Tab by mouth two (2) times a day.  Indications: OSTEOARTHRITIS 28 Tab 0    prenatal vit-calcium-iron-fa (PRENATAL PLUS) 29 mg iron- 1 mg tab tablet Take 1 Tab by mouth daily. Please provide $4 formulary 90 Tab 1    Arm Brace (WRIST BRACE MEDIUM) misc Use PRN for wrist pain 1 Each 0    Blood-Glucose Meter monitoring kit Use prn to check blood glucose. Use cuando necesita para revisar foster azucar. 1 Kit 0    metFORMIN ER (GLUCOPHAGE XR) 500 mg tablet Take 4 Tabs by mouth daily (with dinner). [place] 4 pastillas con hong 180 Tab 1       No Known Allergies    Social History   Substance Use Topics    Smoking status: Never Smoker    Smokeless tobacco: Never Used    Alcohol use No       No family history on file.     Physical Exam:     Visit Vitals    /82 (BP 1 Location: Left arm, BP Patient Position: Sitting)    Pulse 88    Temp 98.4 °F (36.9 °C) (Oral)    Wt 197 lb (89.4 kg)    LMP 07/24/2018    BMI 38.47 kg/m2       A&Ox3  WDWN NAD  Respirations normal and non labored  Lab Results   Component Value Date/Time    Hemoglobin A1c 9.1 (H) 02/26/2018 11:10 AM     Lab Results   Component Value Date/Time    Glucose 369 (H) 02/26/2018 11:10 AM    Glucose  NF 08/14/2018 10:45 AM

## 2018-08-14 NOTE — MR AVS SNAPSHOT
[place] 
 
 
 [address] [facility] [address] [address] 
[phone] Patient: Amadeo Parson MRN: [id number] :[id number] Visit Information Jeannine Parr Personal Médico Departamento Teléfono del Dep. Número de visita 2018 10:30 AM Clearance Katie [address] [facility] [phone] Follow-up Instructions Return in about 3 months (around 2018). Upcoming Health Maintenance Date Due  
 FOOT EXAM Q1 1989 Pneumococcal 19-64 Medium Risk (1 of 1 - PPSV23) 1998 Influenza Age 5 to Adult 2018 HEMOGLOBIN A1C Q6M 2018 EYE EXAM RETINAL OR DILATED Q1 10/26/2018 MICROALBUMIN Q1 3/12/2019 LIPID PANEL Q1 3/12/2019 PAP AKA CERVICAL CYTOLOGY 2019 DTaP/Tdap/Td series (2 - Td) 3/25/2027 Alergias  Review Complete El: 2018 Por: Seun Nasuti A partir del:  2018 No Known Allergies Vacunas actuales Revisadas el:  2018 Devere Shilpa Tdap 3/25/2017  3:58 AM  
  
 No revisadas esta visita You Were Diagnosed With   
  
 Balaji Pettit Type 2 diabetes mellitus with hyperglycemia, without long-term current use of insulin (HCC)    -  Primary ICD-10-CM: E11.65 ICD-9-CM: 250.00, 790.29 Acute pain of left knee     ICD-10-CM: M25.562 ICD-9-CM: 719.46   
 BV (bacterial vaginosis)     ICD-10-CM: N76.0, B96.89 
ICD-9-CM: 616.10, 041.9 Partes vitales PS Pulso Temperatura Peso (percentil de crecimiento) LMP (última anai) BMI [facility]) 120/82 (BP 1 Location: Left arm, BP Patient Position: Sitting) 88 98.4 °F (36.9 °C) (Oral) 197 lb (89.4 kg) 2018 38.47 kg/m2 Estado obstétrico Estatus de tabaquísmo Having regular periods Never Smoker Historial de signos vitales BMI and BSA Data Body Mass Index Body Surface Area  
 38.47 kg/m 2 1.95 m 2 [facility] Name Phone [address] [facility] [phone] Harris lista de medicamentos actualizada Neeta Molt actualizada 8/14/18 10:59 AM.  Edilia Jessica use harris lista de medicamentos más reciente. Arm Brace Misc También conocido mohan:  WRIST BRACE MEDIUM Use PRN for wrist pain Blood-Glucose Meter monitoring kit Use prn to check blood glucose. Use cuando necesita para revisar harris azucar. diclofenac EC 50 mg EC tablet También conocido mohan:  VOLTAREN Take 1 Tab by mouth two (2) times a day. Indications: OSTEOARTHRITIS  
  
 metFORMIN  mg tablet También conocido mohan:  GLUCOPHAGE XR Take 4 Tabs by mouth daily (with dinner). [place] 4 pastillas con hong  
  
 metroNIDAZOLE 500 mg tablet También conocido mohan:  FLAGYL Take 1 Tab by mouth two (2) times a day. prenatal vit-calcium-iron-fa 29 mg iron- 1 mg Tab tablet También conocido mohan:  PRENATAL PLUS Take 1 Tab by mouth daily. Please provide $4 formulary [employer] a la [place] Refills  
 metroNIDAZOLE (FLAGYL) 500 mg tablet 0 Sig: Take 1 Tab by mouth two (2) times a day. Class: Normal  
 Pharmacy: [facility] [address] [facility] #: [phone] Route: Oral  
  
Hicimos lo siguiente AMB POC GLUCOSE BLOOD, BY GLUCOSE MONITORING DEVICE [04072 CPT(R)] REFERRAL TO ORTHOPEDIC SURGERY [REF62 Custom] Instrucciones de seguimiento Return in about 3 months (around 11/14/2018). Por hacer 08/14/2018 Lab:  HEMOGLOBIN A1C WITH EAG Informacion de [employer] de Referencia Referido por Referido a  
  
 [id number] RY STANLEY No disponible Visitas [place] de inicio [place] final  
 1 New Request 8/14/18 8/14/19 Si harris referencia tiene un estado de \"pending review\" o \"denied\" , informacion adicional sera enviada para apoyar el resultado de esta decision. Instrucciones para el Paciente Bacterial Vaginosis: Care Instructions Your Care Instructions Bacterial vaginosis is a type of vaginal infection. It is caused by excess growth of certain bacteria that are normally found in the vagina. Symptoms can include itching, swelling, pain when you urinate or have sex, and a gray or yellow discharge with a \"fishy\" odor. It is not considered an infection that is spread through sexual contact. Although symptoms can be annoying and uncomfortable, bacterial vaginosis does not usually cause other health problems. However, if you have it while you are pregnant, it can cause complications. While the infection may go away on its own, most doctors use antibiotics to treat it. You may have been prescribed pills or vaginal cream. With treatment, bacterial vaginosis usually clears up in 5 to 7 days. Follow-up care is a key part of your treatment and safety. Be sure to make and go to all appointments, and call your doctor if you are having problems. It's also a good idea to know your test results and keep a list of the medicines you take. How can you care for yourself at home? · Take your antibiotics as directed. Do not stop taking them just because you feel better. You need to take the full course of antibiotics. · Do not eat or drink anything that contains alcohol if you are taking metronidazole (Flagyl). · Keep using your medicine if you start your period. Use pads instead of tampons while using a vaginal cream or suppository. Tampons can absorb the medicine. · Wear loose cotton clothing. Do not wear nylon and other materials that hold body heat and moisture close to the skin. · Do not scratch. Relieve itching with a cold pack or a cool bath. · Do not wash your vaginal area more than once a day. Use plain water or a mild, unscented soap. Do not douche. When should you call for help? Watch closely for changes in your health, and be sure to contact your doctor if:   · You have unexpected vaginal bleeding.  
  · You have a fever.  
  · You have new or increased pain in your vagina or pelvis.  
  · You are not getting better after 1 week.  
  · Your symptoms return after you finish the course of your medicine. Where can you learn more? Go to http://damion-david.info/. Sonny Danette in the search box to learn more about \"Bacterial Vaginosis: Care Instructions. \" Current as of: October 6, 2017 Content Version: 11.7 © 0804-4972 [employer]. Care instructions adapted under license by [employer] (which disclaims liability or warranty for this information). If you have questions about a medical condition or this instruction, always ask your healthcare professional. [address] any warranty or liability for your use of this information. Introducing [facility]! [facility] introduce portal paciente MyChart . Ahora se puede acceder a partes de foster expediente médico, enviar por correo electrónico la oficina de foster médico y solicitar renovaciones de medicamentos en línea. En foster navegador de Internet , Armida Newton a [email or web address] Cuca clic en el usuario por Saranya Zuluaga? Tanner Shaila clic aquí en la sesión Stefanie Favre. Verá la página de registro [place]. Ingrese foster código de [employer] michael y mohan aparece a continuación. Usted no tendrá que [employer] código después de deandra completado el proceso de registro . Si usted no se inscribe antes de la fecha de caducidad , debe solicitar un nuevo código. · MyChart Código de acceso : [id number] Expires: 9/17/2018  9:52 AM 
 
Ingresa los últimos cuatro dígitos de foster Número de Seguro Social ( xxxx ) y fecha de nacimiento ( dd / mm / aaaa ) mohan se indica y cuca clic en Enviar. Usted será llevado a la siguiente página de registro . Crear un ID MyChart .  Esta será foster ID de inicio de sesión de MyChart y no puede ser [place] , por lo que pensar en scott que es bucio y fácil de recordar . Crear scott contraseña MyChart . Usted puede cambiar foster contraseña en cualquier momento . Ingrese foster Password Reset de preguntas y [place] . [place] se puede utilizar en un momento posterior si usted olvida foster contraseña. Introduzca foster dirección de correo electrónico . Rosalina Holter recibirá scott notificación por correo electrónico cuando la nueva información está disponible en MyChart . Xu Lacks clic en Registrarse. Bola Gianna becki y descargar porciones de foster expediente médico. 
Alison clic en el enlace de descarga del menú Resumen para descargar scott copia portátil de foster información médica . Si tiene [employer] , por favor visite la sección de preguntas frecuentes del sitio web MyChart . Recuerde, MyChart NO es que se utilizará para las necesidades urgentes. Para emergencias médicas , llame al 911 . Ahora disponible en foster iPhone y Android ! Por favor proporcione raul resumen de la documentación de cuidado a foster próximo proveedor. Your primary care clinician is listed as Aneita Fee. If you have any questions after today's visit, please call [phone].

## 2018-08-18 DIAGNOSIS — E11.9 DM TYPE 2, NOT AT GOAL (HCC): Primary | ICD-10-CM

## 2018-08-18 RX ORDER — GLIPIZIDE 5 MG/1
5 TABLET ORAL
Qty: 60 TAB | Refills: 2 | Status: SHIPPED | OUTPATIENT
Start: 2018-08-18 | End: 2019-02-19

## 2018-08-18 NOTE — PROGRESS NOTES
Continued elevation of a1c, now average blood sugar 243. Please have her add glipizide 5 mg 30 min before breakfast and 30 min before dinner. Make sure she has f/up in 3 months please.  Limit sugary foods, get more physical activity

## 2018-10-18 ENCOUNTER — TELEPHONE (OUTPATIENT)
Dept: FAMILY MEDICINE CLINIC | Age: 38
End: 2018-10-18

## 2018-10-18 NOTE — TELEPHONE ENCOUNTER
Patient called  10/9 and it looks like Sheri Lindquist had originally seeen patient, then referred her to Ann Moscoso. Patient had no show for a f/u on 10/12. She called on 10/9 because someone called her? It was the company who reminds patients of their appts. At this time we tried to make a f/u contact with her - Voice mail box not set up.

## 2019-02-19 ENCOUNTER — OFFICE VISIT (OUTPATIENT)
Dept: FAMILY MEDICINE CLINIC | Age: 39
End: 2019-02-19

## 2019-02-19 VITALS
HEIGHT: 59 IN | WEIGHT: 192 LBS | DIASTOLIC BLOOD PRESSURE: 82 MMHG | SYSTOLIC BLOOD PRESSURE: 119 MMHG | BODY MASS INDEX: 38.71 KG/M2 | TEMPERATURE: 98 F | HEART RATE: 92 BPM

## 2019-02-19 DIAGNOSIS — Z13.9 ENCOUNTER FOR SCREENING: Primary | ICD-10-CM

## 2019-02-19 DIAGNOSIS — N93.8 DUB (DYSFUNCTIONAL UTERINE BLEEDING): ICD-10-CM

## 2019-02-19 DIAGNOSIS — E11.65 TYPE 2 DIABETES MELLITUS WITH HYPERGLYCEMIA, WITHOUT LONG-TERM CURRENT USE OF INSULIN (HCC): ICD-10-CM

## 2019-02-19 LAB
GLUCOSE POC: NORMAL MG/DL
HGB BLD-MCNC: 13.8 G/DL

## 2019-02-19 RX ORDER — NORGESTIMATE AND ETHINYL ESTRADIOL 0.25-0.035
1 KIT ORAL DAILY
Qty: 1 DOSE PACK | Refills: 5 | Status: SHIPPED | OUTPATIENT
Start: 2019-02-19 | End: 2019-04-23

## 2019-02-19 RX ORDER — METFORMIN HYDROCHLORIDE 500 MG/1
2000 TABLET, EXTENDED RELEASE ORAL
Qty: 180 TAB | Refills: 1 | Status: SHIPPED | OUTPATIENT
Start: 2019-02-19 | End: 2019-04-16

## 2019-02-19 NOTE — PROGRESS NOTES
Results for orders placed or performed in visit on 02/19/19   AMB POC GLUCOSE BLOOD, BY GLUCOSE MONITORING DEVICE   Result Value Ref Range    Glucose POC [facility] mg/dL     Results for orders placed or performed in visit on 02/19/19   AMB POC GLUCOSE BLOOD, BY GLUCOSE MONITORING DEVICE   Result Value Ref Range    Glucose POC [facility] mg/dL   AMB POC HEMOGLOBIN (HGB)   Result Value Ref Range    Hemoglobin (POC) 13.8

## 2019-02-21 NOTE — PROGRESS NOTES
Assessment/Plan:    Diagnoses and all orders for this visit:    1. Encounter for screening  -     AMB POC GLUCOSE BLOOD, BY GLUCOSE MONITORING DEVICE  -     AMB POC HEMOGLOBIN (HGB)    2. Type 2 diabetes mellitus with hyperglycemia, without long-term current use of insulin (HCC)  -     metFORMIN ER (GLUCOPHAGE XR) 500 mg tablet; Take 4 Tabs by mouth daily (with dinner). [place] 4 pastillas con hong    3. DUB (dysfunctional uterine bleeding)  -     norgestimate-ethinyl estradiol (ORTHO-CYCLEN, [facility]) 0.25-35 mg-mcg tab; Take 1 Tab by mouth daily. Pt DOES NOT take her metformin as directed and IS REFUSING any other meds  She didn't like the way she felt with glipizide so she stopped that after a few doses  She wants to restart her metformin and take it as directed, she agrees to close f/up  Not checking a1c today as she has not been taking her metformin so likely still elevated (was 10)  Discussed in depth the reason why uncontrolled BS can cause severe organ damage    Follow-up Disposition:  Return in about 4 weeks (around 3/19/2019). ROGERS Wilson expressed understanding of this plan. An AVS was printed and given to the patient.      ----------------------------------------------------------------------    Chief Complaint   Patient presents with    Diabetes     f/u    Irregular Menses       History of Present Illness:  Pt here for DM recheck. Her BS is high. She has not been taking her metformin and only took glipizide for a few days and then stopped it. She can't remember to take her meds. She has a very sporadic eating habit- she doesn't always eat breakfast and or dinner. She almost always eats lunch so I have asked her to please take her meds at work but she is not sure she will do that. She continues to drink lemonade from the fountain at work although now she cuts it in half with water.    She saw the dietician before and can't go back due to work at this time  She was taking her metformin about \"3 times a week\"  She denies blurry vision, vaginal itching, polyuria/polydipsia. She did have vaginal itching a few weeks ago and she remembered that I had told her that this is a side effect of uncontrolled high blood sugar so she started back on her metformin. Past Medical History:   Diagnosis Date    Diabetes ([facility] [address]     GDM (gestational diabetes mellitus)     IUD (intrauterine device) since 2013        Current Outpatient Medications   Medication Sig Dispense Refill    metFORMIN ER (GLUCOPHAGE XR) 500 mg tablet Take 4 Tabs by mouth daily (with dinner). [place] 4 pastillas con hong 180 Tab 1    norgestimate-ethinyl estradiol (ORTHO-CYCLEN, [employer]) 0.25-35 mg-mcg tab Take 1 Tab by mouth daily. 1 Dose Pack 5    metroNIDAZOLE (FLAGYL) 500 mg tablet Take 1 Tab by mouth two (2) times a day. 14 Tab 0    diclofenac EC (VOLTAREN) 50 mg EC tablet Take 1 Tab by mouth two (2) times a day. Indications: OSTEOARTHRITIS 28 Tab 0    prenatal vit-calcium-iron-fa (PRENATAL PLUS) 29 mg iron- 1 mg tab tablet Take 1 Tab by mouth daily. Please provide $4 formulary 90 Tab 1    Arm Brace (WRIST BRACE MEDIUM) misc Use PRN for wrist pain 1 Each 0    Blood-Glucose Meter monitoring kit Use prn to check blood glucose. Use cuando necesita para revisar foster azucar. 1 Kit 0       No Known Allergies    Social History     Tobacco Use    Smoking status: Never Smoker    Smokeless tobacco: Never Used   Substance Use Topics    Alcohol use: No     Alcohol/week: 0.0 oz    Drug use: No       No family history on file.     Physical Exam:     Visit Vitals  /82 (BP 1 Location: Left arm, BP Patient Position: Sitting)   Pulse 92   Temp 98 °F (36.7 °C) (Oral)   Ht 4' 11.15\" (1.502 m)   Wt 192 lb (87.1 kg)   LMP 02/12/2019   BMI 38.58 kg/m²       A&Ox3  WDWN NAD  Respirations normal and non labored  Lab Results   Component Value Date/Time    Hemoglobin A1c 10.1 (H) 08/14/2018 11:20 AM     Lab Results   Component Value Date/Time    Sodium 135 (L) 02/26/2018 11:10 AM    Potassium 4.4 02/26/2018 11:10 AM    Chloride 101 02/26/2018 11:10 AM    CO2 24 02/26/2018 11:10 AM    Anion gap 10 02/26/2018 11:10 AM    Glucose 369 (H) 02/26/2018 11:10 AM    BUN 15 02/26/2018 11:10 AM    Creatinine 0.80 02/26/2018 11:10 AM    BUN/Creatinine ratio 19 02/26/2018 11:10 AM    GFR est AA >60 02/26/2018 11:10 AM    GFR est non-AA >60 02/26/2018 11:10 AM    Calcium 9.1 02/26/2018 11:10 AM    Bilirubin, total 0.4 02/26/2018 11:10 AM    AST (SGOT) 43 (H) 02/26/2018 11:10 AM    Alk.  phosphatase 106 02/26/2018 11:10 AM    Protein, total 7.2 02/26/2018 11:10 AM    Albumin 3.6 02/26/2018 11:10 AM    Globulin 3.6 02/26/2018 11:10 AM    A-G Ratio 1.0 (L) 02/26/2018 11:10 AM    ALT (SGPT) 85 (H) 02/26/2018 11:10 AM     Lab Results   Component Value Date/Time    Cholesterol, total 191 03/12/2018 02:19 PM    HDL Cholesterol 56 03/12/2018 02:19 PM    LDL, calculated 82.8 03/12/2018 02:19 PM    VLDL, calculated 52.2 03/12/2018 02:19 PM    Triglyceride 261 (H) 03/12/2018 02:19 PM    CHOL/HDL Ratio 3.4 03/12/2018 02:19 PM

## 2019-03-20 ENCOUNTER — OFFICE VISIT (OUTPATIENT)
Dept: FAMILY MEDICINE CLINIC | Age: 39
End: 2019-03-20

## 2019-03-20 VITALS
HEART RATE: 82 BPM | BODY MASS INDEX: 38.22 KG/M2 | DIASTOLIC BLOOD PRESSURE: 82 MMHG | TEMPERATURE: 98.3 F | SYSTOLIC BLOOD PRESSURE: 124 MMHG | WEIGHT: 190.2 LBS

## 2019-03-20 DIAGNOSIS — E11.65 TYPE 2 DIABETES MELLITUS WITH HYPERGLYCEMIA, WITHOUT LONG-TERM CURRENT USE OF INSULIN (HCC): Primary | ICD-10-CM

## 2019-03-20 DIAGNOSIS — J06.9 ACUTE URI: ICD-10-CM

## 2019-03-20 LAB — GLUCOSE POC: NORMAL MG/DL

## 2019-03-20 RX ORDER — PSEUDOEPHEDRINE HCL 30 MG
30 TABLET ORAL
Qty: 20 TAB | Refills: 1 | Status: SHIPPED | OUTPATIENT
Start: 2019-03-20 | End: 2019-10-31

## 2019-03-20 RX ORDER — INSULIN PUMP SYRINGE, 3 ML
EACH MISCELLANEOUS
Qty: 1 KIT | Refills: 0 | Status: SHIPPED | OUTPATIENT
Start: 2019-03-20 | End: 2021-04-16 | Stop reason: SDUPTHER

## 2019-03-20 NOTE — PROGRESS NOTES
Coordination of Care  1. Have you been to the ER, urgent care clinic since your last visit? Hospitalized since your last visit? No    2. Have you seen or consulted any other health care providers outside of the [address] [place] [address] since your last visit? Include any pap smears or colon screening. No    Does the patient need refills? NO    Learning Assessment Complete?  yes

## 2019-03-20 NOTE — PROGRESS NOTES
Assessment/Plan:    Diagnoses and all orders for this visit:    1. Type 2 diabetes mellitus with hyperglycemia, without long-term current use of insulin ([facility])  -     AMB POC GLUCOSE BLOOD, BY GLUCOSE MONITORING DEVICE  -     insulin NPH (NOVOLIN N, HUMULIN N) 100 unit/mL injection; Sig: use 12 units qhs  -     Blood-Glucose Meter monitoring kit; Sig: check blood sugar bid, please provide relion brand for self pay patient    2. Acute URI  -     pseudoephedrine (SUDAFED) 30 mg tablet; Take 1 Tab by mouth every four (4) hours as needed for Congestion. Pt agreeable to going on insulin. She was last on insulin during her pregnancy 6 years ago so will start with nurse teaching, as if she in new to insulin  Continue making healthy dietary selections  Continue metformin. Will get labs once she has been back on meds for 12 weeks, now it has been 4 weeks back on metformin and she is feeling much better   [facility] referral for 6/19 when she is 35years old- needs pap and mammo same day      Follow-up Disposition: Not on File    [facility]  [address] expressed understanding of this plan. An AVS was printed and given to the patient.      ----------------------------------------------------------------------    Chief Complaint   Patient presents with    Diabetes     Diabetes recheck       History of Present Illness:    Here for short interval recheck on DM. She restarted rx last month and has been taking metformin as directed and following a healthier diet. She states that she is feeling much better and her vaginal complaint are now resolved. She has had cold sxs as well as her . Her last \"fever\" was 4 days ago. She had hoarse voice, nasal congestion, ear pressure.  The sxs are better but not resolved     Past Medical History:   Diagnosis Date    Diabetes ([address]     GDM (gestational diabetes mellitus)     IUD (intrauterine device) since 2013        Current Outpatient Medications   Medication Sig Dispense Refill    insulin NPH (NOVOLIN N, HUMULIN N) 100 unit/mL injection Sig: use 12 units qhs 1 Vial 2    Blood-Glucose Meter monitoring kit Sig: check blood sugar bid, please provide relion brand for self pay patient 1 Kit 0    pseudoephedrine (SUDAFED) 30 mg tablet Take 1 Tab by mouth every four (4) hours as needed for Congestion. 20 Tab 1    metFORMIN ER (GLUCOPHAGE XR) 500 mg tablet Take 4 Tabs by mouth daily (with dinner). [place] 4 pastillas con hong 180 Tab 1    norgestimate-ethinyl estradiol (ORTHO-CYCLEN, SPRINTEC) 0.25-35 mg-mcg tab Take 1 Tab by mouth daily. 1 Dose Pack 5    metroNIDAZOLE (FLAGYL) 500 mg tablet Take 1 Tab by mouth two (2) times a day. 14 Tab 0    diclofenac EC (VOLTAREN) 50 mg EC tablet Take 1 Tab by mouth two (2) times a day. Indications: OSTEOARTHRITIS 28 Tab 0    prenatal vit-calcium-iron-fa (PRENATAL PLUS) 29 mg iron- 1 mg tab tablet Take 1 Tab by mouth daily. Please provide $4 formulary 90 Tab 1    Arm Brace (WRIST BRACE MEDIUM) misc Use PRN for wrist pain 1 Each 0       No Known Allergies    Social History     Tobacco Use    Smoking status: Never Smoker    Smokeless tobacco: Never Used   Substance Use Topics    Alcohol use: No     Alcohol/week: 0.0 oz    Drug use: No       No family history on file.     Physical Exam:     Visit Vitals  /82 (BP 1 Location: Left arm, BP Patient Position: Sitting)   Pulse 82   Temp 98.3 °F (36.8 °C) (Oral)   Wt 190 lb 3.2 oz (86.3 kg)   LMP 03/13/2019   BMI 38.22 kg/m²     gen looks well  A&Ox3  WDWN NAD  Respirations normal and non labored  HEENT- bernadette TM full, bulging clear fluid  Nares swollen injected turbinates bernadette  OP + redness with post nasal drip  Neck supple  Lungs CTA bernadette

## 2019-03-20 NOTE — PATIENT INSTRUCTIONS
Infección de las vías respiratorias altas ([place]): Instrucciones de cuidado - [ Upper Respiratory Infection (Cold): Care Instructions ]  Instrucciones de cuidado    La infección de las vías respiratorias altas (o URI, por jerilyn siglas en inglés), es scott infección de la [place], los senos paranasales o la garganta. Las URI se transmiten por la tos, los estornudos y el contacto directo. El resfriado común es el tipo más frecuente de URI. La gripe y las infecciones de los senos paranasales son otros tipos de URI. Madhavi todas las URI son causadas por virus. Los antibióticos no las [place]. Sin embargo, usted puede tratar la mayoría de estas infecciones con cuidados en el hogar. [place] puede implicar beber muchos líquidos y suhail analgésicos (medicamentos para el dolor) de venta subha. Es probable que se sienta mejor al cabo de 4 a 10 días. El médico lo wheat revisado minuciosamente, reno se pueden presentar problemas más tarde. Si nota algún problema o nuevos síntomas, busque tratamiento médico inmediatamente. La atención de seguimiento es scott parte clave de foster tratamiento y seguridad. Asegúrese de hacer y acudir a todas las citas, y llame a foster médico si está teniendo problemas. También es scott buena idea saber los resultados de jerilyn exámenes y mantener scott lista de los medicamentos que jessica. ¿Cómo puede cuidarse en el hogar? · Para prevenir la deshidratación, marie abundantes líquidos, los suficientes mohan para que foster orina sea de color amarillo fela o transparente mohan el agua. Opte por beber agua y otros líquidos sharath sin cafeína hasta que se sienta mejor. Si tiene [facility] [employer], del corazón o del hígado y tiene que Holly's líquidos, hable con foster médico antes de aumentar foster consumo. · [place] un analgésico de venta subha, mohan acetaminofén (Tylenol), ibuprofeno (Advil, Motrin) o naproxeno (Aleve). Dana y siga todas las instrucciones de la [place].   · Antes de usar medicamentos para la tos y los resfriados, revise la etiqueta. Estos medicamentos podrían no ser seguros para los niños pequeños o las personas con ciertos problemas de [place]. · Tenga cuidado cuando tome medicamentos de venta subha para el resfriado común o la gripe y Tylenol al [employer]. Muchos de estos medicamentos contienen acetaminofén, o sea, Tylenol. Dana las etiquetas para asegurarse de que no está tomando scott dosis mayor que la recomendada. El exceso de acetaminofén (Tylenol) puede ser dañino. · Descanse lo suficiente. · No fume ni permita que otros fumen cerca de usted. Si necesita ayuda para dejar de fumar, hable con harris médico acerca de programas y medicamentos para dejar de fumar. Estos pueden aumentar jerilyn probabilidades de dejar el hábito para siempre. ¿Cuándo debe pedir ayuda? Llame al 911 en cualquier momento que considere que necesita atención de [place]. Por ejemplo, llame si:    · Tiene graves dificultades para respirar.    Llame a harris médico ahora mismo o busque atención médica inmediata si:    · Le parece que está mucho más enfermo.     · Tiene nueva o peor dificultad para respirar.     · Tiene fiebre nueva o más see.     · Tiene un salpullido nuevo.    Preste especial atención a los cambios en harris frederic y asegúrese de comunicarse con harris médico si:    · Tiene síntomas nuevos, mohan dolor de garganta, dolor de oídos o dolor de los senos paranasales.     · Harris tos es más profunda o más frecuente que antes, especialmente si nota más mucosidad o un cambio en el color de la mucosidad.     · No mejora mohan se esperaba. ¿Dónde puede encontrar más información en inglés? Tammy Ward a http://damion-david.info/. Emily RAND47 en la búsqueda para aprender más acerca de \"Infección de las vías respiratorias altas ([place]): Instrucciones de cuidado - [ Upper Respiratory Infection (Cold): Care Instructions ]. \"  Revisado: 5 septiembre, 2018  Versión del contenido: 11.9  © 7793-8778 [employer].  [facility] instrucciones de cuidado fueron adaptadas bajo licencia por [facility] (which disclaims liability or warranty for this information). Si usted tiene [place] afección médica o sobre estas instrucciones, siempre pregunte a foster profesional de frederic. [facility] niega toda garantía o responsabilidad por foster uso de esta información.

## 2019-03-20 NOTE — PROGRESS NOTES
At discharge station AVS was printed and reviewed with [facility] as . Reviewed and discussed the following as written in provider check out note copied below:Molly valdez     Check-out Note: Nurse visit for insulin teaching   ? PAP med versus self pay for insulin, first vial she will buy   F/up appt can be with Vidya Castanon as pt prefers tuesdays   Instrucciones para seguir     0 Return in about 6 weeks (around 5/1/2019). Provided pt with information and phone # for [facility]. Explained that they will do a financial screening before scheduling appt. Pt taken to registration to make return appointments as directed by provider today. Explained to pt that we can review glucometer and PAP application if she brings these to nurse visit. She says she can complete application and is working and will bring in paystubs with application. She has used a glucometer before and knows how to do this but will let us know if she needs help with this getting set up.  Fina Taylor, RN

## 2019-03-20 NOTE — PROGRESS NOTES
Results for orders placed or performed in visit on 03/20/19   AMB POC GLUCOSE BLOOD, BY GLUCOSE MONITORING DEVICE   Result Value Ref Range    Glucose   NF mg/dL

## 2019-03-26 ENCOUNTER — OFFICE VISIT (OUTPATIENT)
Dept: FAMILY MEDICINE CLINIC | Age: 39
End: 2019-03-26

## 2019-03-26 DIAGNOSIS — Z71.9 COUNSELED BY NURSE: Primary | ICD-10-CM

## 2019-03-26 NOTE — PROGRESS NOTES
Pt in clinic today for insulin teaching. States she has taken insulin in the past.  Advised pt to self demonstrate how to draw up insulin and how to inject. Pt was able to demonstrate correct technique. Went over the locations that she can inject. Pt has been educated on getting a sharps container or using an used laundry detergent bottle. Foot care was also discussed. sighs and symptoms of high and low glucose were also discussed. Pt verbalized that she is taking 12 units of insulin at night. Pt also dropped off her pap application along with her first 2 pages of the tax return.

## 2019-03-27 ENCOUNTER — TELEPHONE (OUTPATIENT)
Dept: FAMILY MEDICINE CLINIC | Age: 39
End: 2019-03-27

## 2019-03-27 NOTE — TELEPHONE ENCOUNTER
Telephone call made to the patient x2 today and each time the number was busy. Patient turned in a PAP application yesterday with a copy of her 2018 tax return, but no paycheck stubs or support letter. Need to clarify her income before sending in the application.  Gilbert Pickett RN

## 2019-03-29 ENCOUNTER — DOCUMENTATION ONLY (OUTPATIENT)
Dept: FAMILY MEDICINE CLINIC | Age: 39
End: 2019-03-29

## 2019-03-29 NOTE — TELEPHONE ENCOUNTER
T/C made to Heather Joce to ask whether she needs additional income information from the patient. She stated to send the application as is (only 2018 1040 provided by patient) and she will review and let me know.  Gilbert Pickett RN

## 2019-03-29 NOTE — PROGRESS NOTES
Patient's new PAP application for NPH insulin, max daily dose of 20 units, was faxed to Neyda Khan, [facility], today and fax confirmation received.  Mandeep Lincoln RN

## 2019-04-10 ENCOUNTER — DOCUMENTATION ONLY (OUTPATIENT)
Dept: FAMILY MEDICINE CLINIC | Age: 39
End: 2019-04-10

## 2019-04-10 NOTE — PROGRESS NOTES
Patient's PAP prescription for Humulin N, 20 units daily, has been signed by Tracie Alcala and faxed today to Ev Cosme, [facility]. Fax confirmation received.  Fawn Gruber RN

## 2019-04-16 ENCOUNTER — OFFICE VISIT (OUTPATIENT)
Dept: FAMILY MEDICINE CLINIC | Age: 39
End: 2019-04-16

## 2019-04-16 ENCOUNTER — HOSPITAL ENCOUNTER (OUTPATIENT)
Dept: LAB | Age: 39
Discharge: HOME | End: 2019-04-16

## 2019-04-16 VITALS
SYSTOLIC BLOOD PRESSURE: 111 MMHG | WEIGHT: 193.6 LBS | TEMPERATURE: 98.5 F | BODY MASS INDEX: 38.9 KG/M2 | OXYGEN SATURATION: 96 % | DIASTOLIC BLOOD PRESSURE: 72 MMHG | HEART RATE: 82 BPM

## 2019-04-16 DIAGNOSIS — E11.65 TYPE 2 DIABETES MELLITUS WITH HYPERGLYCEMIA, WITHOUT LONG-TERM CURRENT USE OF INSULIN (HCC): Primary | ICD-10-CM

## 2019-04-16 DIAGNOSIS — E11.65 TYPE 2 DIABETES MELLITUS WITH HYPERGLYCEMIA, WITHOUT LONG-TERM CURRENT USE OF INSULIN (HCC): ICD-10-CM

## 2019-04-16 DIAGNOSIS — Z13.9 ENCOUNTER FOR SCREENING: ICD-10-CM

## 2019-04-16 LAB
CREAT UR-MCNC: 37.2 MG/DL
EST. AVERAGE GLUCOSE BLD GHB EST-MCNC: 214 MG/DL
GLUCOSE POC: NORMAL MG/DL
HBA1C MFR BLD: 9.1 % (ref 4.2–6.3)
MICROALBUMIN UR-MCNC: 66.8 MG/DL
MICROALBUMIN/CREAT UR-RTO: 1796 MG/G (ref 0–30)
TSH SERPL DL<=0.05 MIU/L-ACNC: 4 UIU/ML (ref 0.36–3.74)

## 2019-04-16 PROCEDURE — 83036 HEMOGLOBIN GLYCOSYLATED A1C: CPT

## 2019-04-16 PROCEDURE — 84443 ASSAY THYROID STIM HORMONE: CPT

## 2019-04-16 PROCEDURE — 82570 ASSAY OF URINE CREATININE: CPT

## 2019-04-16 RX ORDER — METFORMIN HYDROCHLORIDE 500 MG/1
2000 TABLET, EXTENDED RELEASE ORAL
Qty: 360 TAB | Refills: 1 | Status: SHIPPED | OUTPATIENT
Start: 2019-04-16 | End: 2019-07-23 | Stop reason: SDUPTHER

## 2019-04-16 NOTE — PROGRESS NOTES
Results for orders placed or performed in visit on 04/16/19   AMB POC GLUCOSE BLOOD, BY GLUCOSE MONITORING DEVICE   Result Value Ref Range    Glucose POC nf 206 mg/dL     Coordination of Care  1. Have you been to the ER, urgent care clinic since your last visit? Hospitalized since your last visit? No    2. Have you seen or consulted any other health care providers outside of the [address] [place] [address] since your last visit? Include any pap smears or colon screening. No    Does the patient need refills? YES    Learning Assessment Complete?  yes  Depression Screening complete in the past 12 months? yes

## 2019-04-16 NOTE — PROGRESS NOTES
Assessment/Plan:       ICD-10-CM ICD-9-CM    1. Type 2 diabetes mellitus with hyperglycemia, without long-term current use of insulin ([facility]) E11.65 250.00 HEMOGLOBIN A1C WITH EAG     790.29 MICROALBUMIN, UR, RAND W/ MICROALB/CREAT RATIO       DIABETES FOOT EXAM      insulin NPH (NOVOLIN N, HUMULIN N) 100 unit/mL injection   2. Encounter for screening Z13.9 V82.9 AMB POC GLUCOSE BLOOD, BY GLUCOSE MONITORING DEVICE    She will have been back on insulin for 8 weeks. Will check A1c today for trending. Had problems with her thyroid in [place]. Will check that tooday. During pregnancy she took 22 units insulin a day. Given reported fasting glucoses on 12 units, we increased qhs insulin a day to 20 units. [id number] [id number]  Phone: [phone] Fax: [phone] [address] [facility] [address], [address] [facility]  [address] [facility] Thad Luna  Phone: [phone] Fax: [phone]      [address]  [address]   Subjective:     Chief Complaint   Patient presents with    Diabetes    Ismael Melton is a 43 y.o. OTHER female who speaks Irish.  used? yes Before insulin, 346 in the morning. Ate 8 am - fried plantain, egg, coffee with milk; before her appointment had small banana and coffee with milk at 12:40. ROS:   No increased frequency of urination, no increased thirst; no chest pain, dyspnea or TIA's; no numbness, tingling or pain in extremities; no reports of hypoglycemia. Diabetes   Brought in medications? no   Last took medications: today Taking medications as prescribed? yes    Last ate: today   Working: yes   Physical Activity? no  Measuring glucoses? yes  Used 12 units last night, 11 pm.  200, 296 fasting in the morning. Last toom metformin yesterday, 4 pills.   Medications:    Current Outpatient Medications:     insulin NPH (NOVOLIN N, HUMULIN N) 100 unit/mL injection, Sig: use 20 units qhs, Disp: 1 Vial, Rfl: 2    Blood-Glucose Meter monitoring kit, Sig: check blood sugar bid, please provide relion brand for self pay patient, Disp: 1 Kit, Rfl: 0    pseudoephedrine (SUDAFED) 30 mg tablet, Take 1 Tab by mouth every four (4) hours as needed for Congestion. , Disp: 20 Tab, Rfl: 1    metFORMIN ER (GLUCOPHAGE XR) 500 mg tablet, Take 4 Tabs by mouth daily (with dinner). [place] 4 pastillas con hong, Disp: 180 Tab, Rfl: 1    norgestimate-ethinyl estradiol (ORTHO-CYCLEN, SPRINTEC) 0.25-35 mg-mcg tab, Take 1 Tab by mouth daily. , Disp: 1 Dose Pack, Rfl: 5    metroNIDAZOLE (FLAGYL) 500 mg tablet, Take 1 Tab by mouth two (2) times a day., Disp: 14 Tab, Rfl: 0    diclofenac EC (VOLTAREN) 50 mg EC tablet, Take 1 Tab by mouth two (2) times a day. Indications: OSTEOARTHRITIS, Disp: 28 Tab, Rfl: 0    prenatal vit-calcium-iron-fa (PRENATAL PLUS) 29 mg iron- 1 mg tab tablet, Take 1 Tab by mouth daily. Please provide $4 formulary, Disp: 90 Tab, Rfl: 1    Arm Brace (WRIST BRACE MEDIUM) misc, Use PRN for wrist pain, Disp: 1 Each, Rfl: 0  Social History: She reports that she has never smoked. She has never used smokeless tobacco. She reports that she does not drink alcohol or use drugs. Family History: Her family history is not on file. Objective:    Foot:  Vitals:    04/16/19 1324   BP: 111/72   Pulse: 82   Temp: 98.5 °F (36.9 °C)   TempSrc: Oral   SpO2: 96%   Weight: 193 lb 9.6 oz (87.8 kg)    Patient's last menstrual period was 04/15/2019.     Results for orders placed or performed in visit on 04/16/19   AMB POC GLUCOSE BLOOD, BY GLUCOSE MONITORING DEVICE   Result Value Ref Range    Glucose POC nf 206 mg/dL      Wt Readings from Last 2 Encounters:   04/16/19 193 lb 9.6 oz (87.8 kg)   03/20/19 190 lb 3.2 oz (86.3 kg)    Weight increased  Hemoglobin A1c   Date Value Ref Range Status   08/14/2018 10.1 (H) 4.2 - 6.3 % Final   02/26/2018 9.1 (H) 4.2 - 6.3 % Final    A1C increased;   Lab Results   Component Value Date/Time    Microalbumin/Creat ratio (mg/g creat) 37 (H) 03/12/2018 02:19 PM    Microalbumin,urine random 3.84 03/12/2018 02:19 PM    Creatinine 0.80 02/26/2018 11:10 AM      Lab Results   Component Value Date/Time    GFR est AA >60 02/26/2018 11:10 AM    GFR est non-AA >60 02/26/2018 11:10 AM       Lab Results   Component Value Date/Time    Cholesterol, total 191 03/12/2018 02:19 PM    HDL Cholesterol 56 03/12/2018 02:19 PM    LDL, calculated 82.8 03/12/2018 02:19 PM    Triglyceride 261 (H) 03/12/2018 02:19 PM    CHOL/HDL Ratio 3.4 03/12/2018 02:19 PM      Lab Results   Component Value Date/Time    ALT (SGPT) 85 (H) 02/26/2018 11:10 AM    AST (SGOT) 43 (H) 02/26/2018 11:10 AM    Alk. phosphatase 106 02/26/2018 11:10 AM    Bilirubin, total 0.4 02/26/2018 11:10 AM     Constitutional: She appears well-developed. Eyes: EOM are normal. Pupils are equal, round, and reactive to light. Neck: Neck supple. No thyromegaly present. Cardiovascular: Normal rate, regular rhythm, normal heart sounds and intact distal pulses. No murmur heard. Pulmonary/Chest: Effort normal and breath sounds normal.   Musculoskeletal: She exhibits no edema. No ulcers of the lower extremities. Assessment/Plan:   Diagnoses and all orders for this visit:    1. Type 2 diabetes mellitus with hyperglycemia, without long-term current use of insulin ([facility])  -     HEMOGLOBIN A1C WITH EAG; Future  -     MICROALBUMIN, UR, RAND W/ MICROALB/CREAT RATIO; Future  -     HM DIABETES FOOT EXAM  -     insulin NPH (NOVOLIN N, HUMULIN N) 100 unit/mL injection; Sig: use 20 units qhs    2. 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 Eats cheese bread. Fried rice. Diabetes Mellitus type 2, under Poor control. Blood pressure under Good control.     Greater than 50% of this 25 minute visit was spent in face-to-face counseling/coordination of care regarding diabetes management. Alex Echavarria DNP, FNP-BC, BC-ADM  Ebony Yuri expressed understanding of this plan.

## 2019-04-16 NOTE — PROGRESS NOTES
Avs discussed with Mel Perales by Discharge Nurse Ofe Eldridge LPN. Discussed medications prescribed today. Pt has been advised that her PAP order has been sent to [facility] and is being processed, it takes about 4-5 wks. Someone will call her when it is available for . Patient verbalized understanding and has no further questions.  AVS printed and given to patient ROLY Mckay : Rosalia Kumar.

## 2019-04-22 ENCOUNTER — DOCUMENTATION ONLY (OUTPATIENT)
Dept: FAMILY MEDICINE CLINIC | Age: 39
End: 2019-04-22

## 2019-04-23 ENCOUNTER — HOSPITAL ENCOUNTER (OUTPATIENT)
Dept: LAB | Age: 39
Discharge: HOME | End: 2019-04-23

## 2019-04-23 ENCOUNTER — OFFICE VISIT (OUTPATIENT)
Dept: FAMILY MEDICINE CLINIC | Age: 39
End: 2019-04-23

## 2019-04-23 VITALS
HEART RATE: 98 BPM | SYSTOLIC BLOOD PRESSURE: 115 MMHG | DIASTOLIC BLOOD PRESSURE: 75 MMHG | TEMPERATURE: 98.5 F | BODY MASS INDEX: 39.38 KG/M2 | WEIGHT: 196 LBS

## 2019-04-23 DIAGNOSIS — E11.65 TYPE 2 DIABETES MELLITUS WITH HYPERGLYCEMIA, WITH LONG-TERM CURRENT USE OF INSULIN (HCC): ICD-10-CM

## 2019-04-23 DIAGNOSIS — R80.8 OTHER PROTEINURIA: Primary | ICD-10-CM

## 2019-04-23 DIAGNOSIS — R80.8 OTHER PROTEINURIA: ICD-10-CM

## 2019-04-23 DIAGNOSIS — E11.65 TYPE 2 DIABETES MELLITUS WITH HYPERGLYCEMIA, WITHOUT LONG-TERM CURRENT USE OF INSULIN (HCC): ICD-10-CM

## 2019-04-23 DIAGNOSIS — Z13.9 ENCOUNTER FOR SCREENING: ICD-10-CM

## 2019-04-23 DIAGNOSIS — Z79.4 TYPE 2 DIABETES MELLITUS WITH HYPERGLYCEMIA, WITH LONG-TERM CURRENT USE OF INSULIN (HCC): ICD-10-CM

## 2019-04-23 DIAGNOSIS — E03.9 ACQUIRED HYPOTHYROIDISM: ICD-10-CM

## 2019-04-23 LAB — GLUCOSE POC: NORMAL MG/DL

## 2019-04-23 PROCEDURE — 80048 BASIC METABOLIC PNL TOTAL CA: CPT

## 2019-04-23 RX ORDER — LEVOTHYROXINE SODIUM 88 UG/1
88 TABLET ORAL
Qty: 90 TAB | Refills: 1 | Status: SHIPPED | OUTPATIENT
Start: 2019-04-23 | End: 2019-07-23 | Stop reason: SDUPTHER

## 2019-04-23 RX ORDER — GLIPIZIDE 5 MG/1
TABLET ORAL
Qty: 90 TAB | Refills: 1 | Status: SHIPPED | OUTPATIENT
Start: 2019-04-23 | End: 2019-07-23

## 2019-04-23 RX ORDER — LISINOPRIL 5 MG/1
5 TABLET ORAL DAILY
Qty: 90 TAB | Refills: 1 | Status: SHIPPED | OUTPATIENT
Start: 2019-04-23 | End: 2019-07-23 | Stop reason: SDUPTHER

## 2019-04-23 NOTE — PROGRESS NOTES
Coordination of Care  1. Have you been to the ER, urgent care clinic since your last visit? Hospitalized since your last visit? No    2. Have you seen or consulted any other health care providers outside of the [address] [place] [address] since your last visit? Include any pap smears or colon screening. No    Does the patient need refills? YES    Learning Assessment Complete?  yes  Depression Screening complete in the past 12 months? yes     Results for orders placed or performed in visit on 04/23/19   AMB POC GLUCOSE BLOOD, BY GLUCOSE MONITORING DEVICE   Result Value Ref Range    Glucose POC nf 277 mg/dL

## 2019-04-23 NOTE — PROGRESS NOTES
Assessment/Plan: At follow up (measure at 2 months), measure TSH, A1c, BMP, urine microalbumin. These have been ordered. Lab results will be available for follow up in 3 months. ICD-10-CM ICD-9-CM    1. Acquired hypothyroidism K24.1 938.3 METABOLIC PANEL, BASIC      levothyroxine (SYNTHROID) 88 mcg tablet   2. Encounter for screening Z13.9 V82.9 AMB POC GLUCOSE BLOOD, BY GLUCOSE MONITORING DEVICE   3. Other proteinuria R80.8 791.0    4. Type 2 diabetes mellitus with hyperglycemia, with long-term current use of insulin ([facility]) E11.65 250.00 glipiZIDE (GLUCOTROL) 5 mg tablet    Z79.4 790.29      V58.67    5. Type 2 diabetes mellitus with hyperglycemia, without long-term current use of insulin ([facility]) E11.65 250.00 insulin NPH (NOVOLIN N, HUMULIN N) 100 unit/mL injection     790.29       Follow-up and Dispositions    · Return in about 3 months (around 7/23/2019). Lisinopril started today due to albuminuria 1,700+. TSH 4, gaining weight, has been treated for hypothyroidism previously - will start levothyroxine and recheck at follow up. A1c showing improvement on 20 units NPH qhs. Will increase to 22 units sc qhs. Add glipizide 5mg before dinner. Continue metformin. May need to use NPH bid, will assess at follow up. [address] [address]  Phone: [phone] Fax: [phone]    [facility]9 [facility] 157, [address] Thad Luna  Phone: [phone] Fax: [phone]      AN-  [address]  Subjective:     Chief Complaint   Patient presents with    Results     lab    Diabetes    Mel Perales is a 43 y.o. OTHER female who speaks Icelandic.  used? yes She has an IUD that is permanent - Essure - cannot get pregnant. HPI: 20 units at night. 150 to 157 fasting.   ROS:   No increased frequency of urination, no increased thirst; no chest pain, dyspnea or TIA's; no numbness, tingling or pain in extremities; no reports of hypoglycemia. Diabetes   Brought in medications? no   Last took medications: today Taking medications as prescribed? yes    Last ate: today, fish and 2 pupusas (not her usual breakfast, she was eating on the run)  Measuring glucoses? yes    Medications:    Current Outpatient Medications:     levothyroxine (SYNTHROID) 88 mcg tablet, Take 1 Tab by mouth Daily (before breakfast). For thyroid. Hungarian sig, Disp: 90 Tab, Rfl: 1    glipiZIDE (GLUCOTROL) 5 mg tablet, 1 po qday 30 min ac dinner for diabetes; [place] 1 por boca 30 min antes de la hnog para la diabetes, Disp: 90 Tab, Rfl: 1    lisinopril (PRINIVIL, ZESTRIL) 5 mg tablet, Take 1 Tab by mouth daily. For your kidneys. Hungarian sig, Disp: 90 Tab, Rfl: 1    insulin NPH (NOVOLIN N, HUMULIN N) 100 unit/mL injection, Sig: use 22 units qhs, Disp: 1 Vial, Rfl: 2    metFORMIN ER (GLUCOPHAGE XR) 500 mg tablet, Take 4 Tabs by mouth daily (with dinner). [place] 4 pastillas con hong, Disp: 360 Tab, Rfl: 1    Blood-Glucose Meter monitoring kit, Sig: check blood sugar bid, please provide relion brand for self pay patient, Disp: 1 Kit, Rfl: 0    pseudoephedrine (SUDAFED) 30 mg tablet, Take 1 Tab by mouth every four (4) hours as needed for Congestion. , Disp: 20 Tab, Rfl: 1    metroNIDAZOLE (FLAGYL) 500 mg tablet, Take 1 Tab by mouth two (2) times a day., Disp: 14 Tab, Rfl: 0    diclofenac EC (VOLTAREN) 50 mg EC tablet, Take 1 Tab by mouth two (2) times a day. Indications: OSTEOARTHRITIS, Disp: 28 Tab, Rfl: 0    prenatal vit-calcium-iron-fa (PRENATAL PLUS) 29 mg iron- 1 mg tab tablet, Take 1 Tab by mouth daily. Please provide $4 formulary, Disp: 90 Tab, Rfl: 1    Arm Brace (WRIST BRACE MEDIUM) misc, Use PRN for wrist pain, Disp: 1 Each, Rfl: 0  Social History: She reports that she has never smoked.  She has never used smokeless tobacco. She reports that she does not drink alcohol or use drugs. Family History: Her family history is not on file. Objective:     Vitals:    04/23/19 1404   BP: 115/75   Pulse: 98   Temp: 98.5 °F (36.9 °C)   TempSrc: Oral   Weight: 196 lb (88.9 kg)    Patient's last menstrual period was 04/15/2019. Results for orders placed or performed in visit on 04/23/19   AMB POC GLUCOSE BLOOD, BY GLUCOSE MONITORING DEVICE   Result Value Ref Range    Glucose POC nf 277 mg/dL      Wt Readings from Last 2 Encounters:   04/23/19 196 lb (88.9 kg)   04/16/19 193 lb 9.6 oz (87.8 kg)    Weight increased; Hemoglobin A1c   Date Value Ref Range Status   04/16/2019 9.1 (H) 4.2 - 6.3 % Final   08/14/2018 10.1 (H) 4.2 - 6.3 % Final    A1C decreased;   Lab Results   Component Value Date/Time    Microalbumin/Creat ratio (mg/g creat) 1,796 (H) 04/16/2019 02:47 PM    Microalbumin,urine random 66.80 04/16/2019 02:47 PM    Creatinine 0.80 02/26/2018 11:10 AM      Lab Results   Component Value Date/Time    GFR est AA >60 02/26/2018 11:10 AM    GFR est non-AA >60 02/26/2018 11:10 AM       Lab Results   Component Value Date/Time    Cholesterol, total 191 03/12/2018 02:19 PM    HDL Cholesterol 56 03/12/2018 02:19 PM    LDL, calculated 82.8 03/12/2018 02:19 PM    Triglyceride 261 (H) 03/12/2018 02:19 PM    CHOL/HDL Ratio 3.4 03/12/2018 02:19 PM      Lab Results   Component Value Date/Time    ALT (SGPT) 85 (H) 02/26/2018 11:10 AM    AST (SGOT) 43 (H) 02/26/2018 11:10 AM    Alk. phosphatase 106 02/26/2018 11:10 AM    Bilirubin, total 0.4 02/26/2018 11:10 AM     Constitutional: She appears well-developed. Eyes: EOM are normal. Pupils are equal, round, and reactive to light. Neck: Neck supple. No thyromegaly present. Cardiovascular: Normal rate, regular rhythm, normal heart sounds and intact distal pulses. No murmur heard. Pulmonary/Chest: Effort normal and breath sounds normal.   Musculoskeletal: She exhibits no edema. No ulcers of the lower extremities.   Assessment/Plan:   Diagnoses and all orders for this visit:    1. Acquired hypothyroidism  -     METABOLIC PANEL, BASIC; Future  -     levothyroxine (SYNTHROID) 88 mcg tablet; Take 1 Tab by mouth Daily (before breakfast). For thyroid. [place]rugu[place] sig    2. Encounter for screening  -     AMB POC GLUCOSE BLOOD, BY GLUCOSE MONITORING DEVICE    3. Other proteinuria    4. Type 2 diabetes mellitus with hyperglycemia, with long-term current use of insulin (HCC)  -     glipiZIDE (GLUCOTROL) 5 mg tablet; 1 po qday 30 min ac dinner for diabetes; Gina 1 por boca 30 min antes de la hong para la diabetes    5. Type 2 diabetes mellitus with hyperglycemia, without long-term current use of insulin (HCC)  -     insulin NPH (NOVOLIN N, HUMULIN N) 100 unit/mL injection; Sig: use 22 units qhs    Other orders  -     lisinopril (PRINIVIL, ZESTRIL) 5 mg tablet; Take 1 Tab by mouth daily. For your kidneys. [place] sig      Diabetes Mellitus type 2, under Poor control. Blood pressure under Good control. Greater than 50% of this 25 minute visit was spent in face-to-face counseling/coordination of care regarding diabetes management. Follow-up and Dispositions    · Return in about 3 months (around 7/23/2019). Nanda Land DNP, FNP-BC, BC-ADM  Carmen Garcia expressed understanding of this plan.

## 2019-04-23 NOTE — PROGRESS NOTES
Notes recorded by Caitlin Gonzales NP on 4/20/2019 at 2:15 PM EDT  I have asked patient to return to discuss results.  Will discuss 9.1, 1796 microalb, TSH 4 x 3    Lab Results   Component Value Date/Time    Hemoglobin A1c 9.1 (H) 04/16/2019 02:47 PM    Hemoglobin A1c 10.1 (H) 08/14/2018 11:20 AM    Hemoglobin A1c 9.1 (H) 02/26/2018 11:10 AM     Current Outpatient Medications   Medication Sig Dispense Refill    insulin NPH (NOVOLIN N, HUMULIN N) 100 unit/mL injection Sig: use 20 units qhs 1 Vial 2    metFORMIN ER (GLUCOPHAGE XR) 500 mg tablet Take 4 Tabs by mouth daily (with dinner). [place] 4 pastillas con hong 360 Tab 1    Blood-Glucose Meter monitoring kit Sig: check blood sugar bid, please provide relion brand for self pay patient 1 Kit 0    pseudoephedrine (SUDAFED) 30 mg tablet Take 1 Tab by mouth every four (4) hours as needed for Congestion. 20 Tab 1    norgestimate-ethinyl estradiol (ORTHO-CYCLEN, SPRINTEC) 0.25-35 mg-mcg tab Take 1 Tab by mouth daily. 1 Dose Pack 5    metroNIDAZOLE (FLAGYL) 500 mg tablet Take 1 Tab by mouth two (2) times a day. 14 Tab 0    diclofenac EC (VOLTAREN) 50 mg EC tablet Take 1 Tab by mouth two (2) times a day. Indications: OSTEOARTHRITIS 28 Tab 0    prenatal vit-calcium-iron-fa (PRENATAL PLUS) 29 mg iron- 1 mg tab tablet Take 1 Tab by mouth daily. Please provide $4 formulary 90 Tab 1    Arm Brace (WRIST BRACE MEDIUM) misc Use PRN for wrist pain 1 Each 0     Needs treatment with lisinopril for high microalbumin. Try NPH twice a day. Recheck TSH 6 months unless symptomatic, consider treatment.

## 2019-04-24 LAB
ANION GAP SERPL CALC-SCNC: 6 MMOL/L (ref 5–15)
BUN SERPL-MCNC: 15 MG/DL (ref 6–20)
BUN/CREAT SERPL: 18 (ref 12–20)
CALCIUM SERPL-MCNC: 9.2 MG/DL (ref 8.5–10.1)
CHLORIDE SERPL-SCNC: 104 MMOL/L (ref 97–108)
CO2 SERPL-SCNC: 29 MMOL/L (ref 21–32)
CREAT SERPL-MCNC: 0.84 MG/DL (ref 0.55–1.02)
GLUCOSE SERPL-MCNC: 236 MG/DL (ref 65–100)
POTASSIUM SERPL-SCNC: 4.1 MMOL/L (ref 3.5–5.1)
SODIUM SERPL-SCNC: 139 MMOL/L (ref 136–145)

## 2019-05-28 ENCOUNTER — CLINICAL SUPPORT (OUTPATIENT)
Dept: FAMILY MEDICINE CLINIC | Age: 39
End: 2019-05-28

## 2019-05-28 ENCOUNTER — OFFICE VISIT (OUTPATIENT)
Dept: FAMILY MEDICINE CLINIC | Age: 39
End: 2019-05-28

## 2019-05-28 DIAGNOSIS — Z71.9 COUNSELED BY NURSE: Primary | ICD-10-CM

## 2019-05-28 DIAGNOSIS — E11.65 TYPE 2 DIABETES MELLITUS WITH HYPERGLYCEMIA, WITHOUT LONG-TERM CURRENT USE OF INSULIN (HCC): ICD-10-CM

## 2019-05-28 DIAGNOSIS — Z71.3 DIETARY COUNSELING AND SURVEILLANCE: Primary | ICD-10-CM

## 2019-05-28 NOTE — PROGRESS NOTES
Ofe Parikh was referred for T2DM nutrition education. Insulin shots but has run out x 1 week, wants to speak with  about applying for Medicaid or getting free insulin  Metformin, tolerating well  Not checking BG everyday, never has  Working FT at [employer]  Changes to diet: Has decreased CHO intake    Coffee  Milk    Peach  protein yogurt drinkable  V8 juice    Chicken  Rice  Avocado    Salad (1-2x/week)  Red meat  Rice     Water, mineral water, diet soda,     RD introduced CHO foods and how they affect BG. Discussed that some foods have added sugar and some have naturally occurring sugars. Using food models, RD displayed common CHO foods and their appropriate portion sizes. Emphasized that these foods need to be limited, portioned and always eaten in combination with PRO. Introduced MyPlate and discussed how it can help to ensure both balance and variety. Reviewed the food groups and what each group contributes to our bodies. RD provided pt with measuring cups for more accurate portion tracking.    Handouts provided: MyPlate placemat and Y2CN nutrition therapy

## 2019-05-28 NOTE — PROGRESS NOTES
The pt was here for NV for insulin. The pt stated she has been out of insulin for 1 week and she was asking about the insulin. The chart was reviewed and the PAP application had been faxed to [facility] 04/10/19. The PAP nurse coordinator had been called and she stated the insulin was coming form [facility] and it was very behind in delivery. The pt was asked if she could buy a vial of insulin until the PAP insulin came in, and the pt stated she could. The pt was told the PAP coordinator nurse would contact her as soon as the insuliin was received in the [facility] office. Bubba Bailey was the .  David Baron RN

## 2019-05-29 NOTE — PROGRESS NOTES
Vivia Cranker is able to help with PAP for a little while longer until the CAV program is fully operational. Email sent to [facility] today about the patient's NPH dose increase per LK to a max daily dose of 50 units.  Felicia Hardy RN    Patient Call     Michelle Loza NP  You 21 hours ago (4:27 PM)      Max daily dose 50 units, rx corrected

## 2019-06-25 ENCOUNTER — LAB ONLY (OUTPATIENT)
Dept: FAMILY MEDICINE CLINIC | Age: 39
End: 2019-06-25

## 2019-06-25 ENCOUNTER — HOSPITAL ENCOUNTER (OUTPATIENT)
Dept: LAB | Age: 39
Discharge: HOME | End: 2019-06-25

## 2019-06-25 DIAGNOSIS — Z79.4 TYPE 2 DIABETES MELLITUS WITH HYPERGLYCEMIA, WITH LONG-TERM CURRENT USE OF INSULIN (HCC): ICD-10-CM

## 2019-06-25 DIAGNOSIS — R80.8 OTHER PROTEINURIA: ICD-10-CM

## 2019-06-25 DIAGNOSIS — E11.65 TYPE 2 DIABETES MELLITUS WITH HYPERGLYCEMIA, WITH LONG-TERM CURRENT USE OF INSULIN (HCC): ICD-10-CM

## 2019-06-25 DIAGNOSIS — E03.9 ACQUIRED HYPOTHYROIDISM: ICD-10-CM

## 2019-06-25 DIAGNOSIS — Z13.9 ENCOUNTER FOR SCREENING: Primary | ICD-10-CM

## 2019-06-25 DIAGNOSIS — Z13.9 ENCOUNTER FOR SCREENING: ICD-10-CM

## 2019-06-25 PROCEDURE — 80048 BASIC METABOLIC PNL TOTAL CA: CPT

## 2019-06-25 PROCEDURE — 82043 UR ALBUMIN QUANTITATIVE: CPT

## 2019-06-25 PROCEDURE — 83036 HEMOGLOBIN GLYCOSYLATED A1C: CPT

## 2019-06-25 PROCEDURE — 84443 ASSAY THYROID STIM HORMONE: CPT

## 2019-06-25 PROCEDURE — 87086 URINE CULTURE/COLONY COUNT: CPT

## 2019-06-25 PROCEDURE — 81003 URINALYSIS AUTO W/O SCOPE: CPT

## 2019-06-25 NOTE — PROGRESS NOTES
Pt was here today for labs only. A1C, TSH,BMP, and microalbumin & urine culture was collected.  Lisa Burns

## 2019-06-25 NOTE — PROGRESS NOTES
Patient seen for a Nurse Visit with assistance from alex Tierney. Patient stated she is almost out of Metformin. We reviewed her April prescription, pharmacy location and the process for requesting the available refill at the pharmacy. Patient then asked about the status of her PAP application for insulin. Per chart review, her signed prescription was faxed to Dhiraj [facility], [facility], on 04-10-19. Explained to the patient that [facility] is running behind on processing and shipping PAP orders. Patient confirmed that she is able to purchase vials of insulin at her [address] [place] [address] Pharmacy for about $25 per vial. Patient then stated that on Sunday, 06-23-19, she experienced an episode of malodorous white vaginal discharge and has had intermittent lower abdominal pain with urination since. She denies fever or vaginal bleeding. Patient had labs drawn today and I added a UA and urine culture. Lab confirmed that they had enough urine and did not need the patient to return to them. Patient understands that her report of symptoms and today's lab results will be reviewed by the provider and the patient will be called with any instructions. Patient is also aware that our clinic is full today and that she may seek care at an Urgent [facility], or try to make the line at another [facility] clinic. Patient expressed understanding.   Hailey Lee RN

## 2019-06-26 LAB
ANION GAP SERPL CALC-SCNC: 6 MMOL/L (ref 5–15)
APPEARANCE UR: CLEAR
BILIRUB UR QL: NEGATIVE
BUN SERPL-MCNC: 18 MG/DL (ref 6–20)
BUN/CREAT SERPL: 25 (ref 12–20)
CALCIUM SERPL-MCNC: 8.6 MG/DL (ref 8.5–10.1)
CHLORIDE SERPL-SCNC: 108 MMOL/L (ref 97–108)
CO2 SERPL-SCNC: 25 MMOL/L (ref 21–32)
COLOR UR: NORMAL
CREAT SERPL-MCNC: 0.71 MG/DL (ref 0.55–1.02)
CREAT UR-MCNC: 62.5 MG/DL
EST. AVERAGE GLUCOSE BLD GHB EST-MCNC: 180 MG/DL
GLUCOSE SERPL-MCNC: 143 MG/DL (ref 65–100)
GLUCOSE UR STRIP.AUTO-MCNC: NEGATIVE MG/DL
HBA1C MFR BLD: 7.9 % (ref 4.2–6.3)
HGB UR QL STRIP: NEGATIVE
KETONES UR QL STRIP.AUTO: NEGATIVE MG/DL
LEUKOCYTE ESTERASE UR QL STRIP.AUTO: NEGATIVE
MICROALBUMIN UR-MCNC: 0.66 MG/DL
MICROALBUMIN/CREAT UR-RTO: 11 MG/G (ref 0–30)
NITRITE UR QL STRIP.AUTO: NEGATIVE
PH UR STRIP: 5.5 [PH] (ref 5–8)
POTASSIUM SERPL-SCNC: 4.5 MMOL/L (ref 3.5–5.1)
PROT UR STRIP-MCNC: NEGATIVE MG/DL
SODIUM SERPL-SCNC: 139 MMOL/L (ref 136–145)
SP GR UR REFRACTOMETRY: 1.02 (ref 1–1.03)
TSH SERPL DL<=0.05 MIU/L-ACNC: 1.56 UIU/ML (ref 0.36–3.74)
UROBILINOGEN UR QL STRIP.AUTO: 0.2 EU/DL (ref 0.2–1)

## 2019-06-26 PROCEDURE — 87086 URINE CULTURE/COLONY COUNT: CPT

## 2019-06-27 ENCOUNTER — DOCUMENTATION ONLY (OUTPATIENT)
Dept: FAMILY MEDICINE CLINIC | Age: 39
End: 2019-06-27

## 2019-06-27 LAB
BACTERIA SPEC CULT: NORMAL
CC UR VC: NORMAL
SERVICE CMNT-IMP: NORMAL

## 2019-06-27 NOTE — PROGRESS NOTES
Note from Felicia:  Mendel Listen, RN Abe Agee, NP Cc: Mendel Listen, RN Jenette Crosby,     Patient had labs drawn today and then a NV to discuss some concerns. My note is below. I could not cc the chart to you because of the way the patient was registered. Progress Notes     Signed   Encounter Date:  6/25/2019     Patient seen for a Nurse Visit with assistance from alex Tierney. Patient stated she is almost out of Metformin. We reviewed her April prescription, pharmacy location and the process for requesting the available refill at the pharmacy. Patient then asked about the status of her PAP application for insulin. Per chart review, her signed prescription was faxed to Manuelito Ansari, [facility], on 04-10-19. Explained to the patient that [facility] is running behind on processing and shipping PAP orders. Patient confirmed that she is able to purchase vials of insulin at her [facility] for about $25 per vial. Patient then stated that on Sunday, 06-23-19, she experienced an episode of malodorous white vaginal discharge and has had intermittent lower abdominal pain with urination since. She denies fever or vaginal bleeding. Patient had labs drawn today and I added a UA and urine culture. Lab confirmed that they had enough urine and did not need the patient to return to them. Patient understands that her report of symptoms and today's lab results will be reviewed by the provider and the patient will be called with any instructions. Patient is also aware that our clinic is full today and that she may seek care at an Urgent [facility], or try to make the line at another [facility] clinic. Patient expressed understanding. Stella Cristobal RN          Lab results: all negative/normal.  No change to plan. To return if not improving.   Sheri Razo, BARBRA, FNP-BC, BC-ADM  Board Certified in Advanced Diabetes Management

## 2019-06-28 LAB
BACTERIA SPEC CULT: ABNORMAL
CC UR VC: ABNORMAL
SERVICE CMNT-IMP: ABNORMAL

## 2019-07-12 ENCOUNTER — DOCUMENTATION ONLY (OUTPATIENT)
Dept: FAMILY MEDICINE CLINIC | Age: 39
End: 2019-07-12

## 2019-07-12 NOTE — PROGRESS NOTES
Receipt of patient's PAP insulin order of 4 vials of Humulin N, 100u/ml, 10ml/vial, has been logged into the log book. Routing to [facility] for dose confirmation and permission to deliver to the patient.  Felicia Norton RN    insulin NPH (NOVOLIN N, HUMULIN N) 100 unit/mL injection [[id number]]     Order Details   Dose, Route, Frequency: As Directed    Dispense Quantity: 1 Vial Refills: 2 Fills remaining: --           Sig: Sig: use 22 units qhs; max daily dose 50 units for TPC          Written Date: 05/28/19 Expiration Date: --     Start Date: 05/28/19 End Date: --            Ordering Provider:  -- PRESTON #:  [id number] NPI:  --    Authorizing Provider:  Fabiola Serna NP PRESTON #:  [id number] NPI:  [id number]

## 2019-07-14 NOTE — PROGRESS NOTES
Patient must attend her appointment to see LK on 7/22/19. After the appointment, I will confirm this preliminary confirmation of current dose and permission to deliver.   Current Outpatient Medications   Medication Sig Dispense Refill    insulin NPH (NOVOLIN N, HUMULIN N) 100 unit/mL injection Sig: use 22 units qhs; max daily dose 50 units for TPC 4 Vial From [facility]

## 2019-07-15 NOTE — PROGRESS NOTES
Telephone call made to the patient with assistance from [employer]  #[phone] to let her know that per the provider's direction, her PAP insulin order will be available at her 07-23-19 provider appointment at [facility] and that it is very important that she keep this appointment. Patient expressed understanding.  Kris Khalil RN

## 2019-07-23 ENCOUNTER — OFFICE VISIT (OUTPATIENT)
Dept: FAMILY MEDICINE CLINIC | Age: 39
End: 2019-07-23

## 2019-07-23 VITALS
WEIGHT: 195 LBS | HEART RATE: 80 BPM | TEMPERATURE: 98.1 F | SYSTOLIC BLOOD PRESSURE: 114 MMHG | DIASTOLIC BLOOD PRESSURE: 85 MMHG | BODY MASS INDEX: 39.18 KG/M2 | OXYGEN SATURATION: 98 %

## 2019-07-23 DIAGNOSIS — E03.9 ACQUIRED HYPOTHYROIDISM: ICD-10-CM

## 2019-07-23 DIAGNOSIS — E11.65 TYPE 2 DIABETES MELLITUS WITH HYPERGLYCEMIA, WITHOUT LONG-TERM CURRENT USE OF INSULIN (HCC): Primary | ICD-10-CM

## 2019-07-23 LAB — GLUCOSE POC: NORMAL MG/DL

## 2019-07-23 RX ORDER — LISINOPRIL 5 MG/1
5 TABLET ORAL DAILY
Qty: 90 TAB | Refills: 1 | Status: SHIPPED | OUTPATIENT
Start: 2019-07-23 | End: 2019-10-29 | Stop reason: SINTOL

## 2019-07-23 RX ORDER — LEVOTHYROXINE SODIUM 88 UG/1
88 TABLET ORAL
Qty: 90 TAB | Refills: 1 | Status: SHIPPED | OUTPATIENT
Start: 2019-07-23 | End: 2020-05-19 | Stop reason: SDUPTHER

## 2019-07-23 RX ORDER — METFORMIN HYDROCHLORIDE 500 MG/1
2000 TABLET, EXTENDED RELEASE ORAL
Qty: 360 TAB | Refills: 1 | Status: SHIPPED | OUTPATIENT
Start: 2019-07-23 | End: 2020-04-21 | Stop reason: SDUPTHER

## 2019-07-23 NOTE — PROGRESS NOTES
AVS printed and reviewed. PAP insulin NPH Humulin x 4 vials given and reviewed dose of 22 Units sq at bedtime. Correctly showed 22 units on insulin syringe. Given sample BD insulin syringes x 40 size is 100 units/1cc. E scripts refills sent today discussed. Instructed to schedule f/u appts per provider notes- fasting labs 2 months, 3 month w/ provider. Discussion assisted by CAV , Marcella Craig.

## 2019-07-23 NOTE — PROGRESS NOTES
Assessment/Plan:       ICD-10-CM ICD-9-CM    1. Type 2 diabetes mellitus with hyperglycemia, without long-term current use of insulin (HCC) E11.65 250.00 AMB POC GLUCOSE BLOOD, BY GLUCOSE MONITORING DEVICE     790.29 insulin NPH (NOVOLIN N, HUMULIN N) 100 unit/mL injection      metFORMIN ER (GLUCOPHAGE XR) 500 mg tablet      lisinopril (PRINIVIL, ZESTRIL) 5 mg tablet   2. Acquired hypothyroidism E03.9 244.9 levothyroxine (SYNTHROID) 88 mcg tablet    was not taking insulin every night depending on what she ate. [address] [address]  Phone: [phone] Fax: [phone]    [address] [facility] 157, 86 [facility]  10 [facility] Jeremy  Phone: [phone] Fax: [phone]      [facility]  Subjective:     Chief Complaint   Patient presents with    Follow-up     diabetes    Results     Lab    Yenifer Alberto is a [address] y.o[address] OTHER female who speaks Kuwaiti.  used? yes   Uses insulin 22 units before bed. Also depends on what she has eaten. Fasting 140, 137, 126. HPI: 4/16/19 proteinuria 1800; in June, NORMAL. ROS:   No increased frequency of urination, no increased thirst; no chest pain, dyspnea or TIA's; no numbness, tingling or pain in extremities; no reports of hypoglycemia. Medications:    Current Outpatient Medications:     insulin NPH (NOVOLIN N, HUMULIN N) 100 unit/mL injection, Sig: use 22 units qhs; max daily dose 50 units for TPC, Disp: 1 Vial, Rfl: 2    metFORMIN ER (GLUCOPHAGE XR) 500 mg tablet, Take 4 Tabs by mouth daily (with dinner). [place] 4 pastillas con hong, Disp: 360 Tab, Rfl: 1    lisinopril (PRINIVIL, ZESTRIL) 5 mg tablet, Take 1 Tab by mouth daily. For your kidneys. Kuwaiti sig, Disp: 90 Tab, Rfl: 1    levothyroxine (SYNTHROID) 88 mcg tablet, Take 1 Tab by mouth Daily (before breakfast). For thyroid.   [place]i sig, Disp: 90 Tab, Rfl: 1    Blood-Glucose Meter monitoring kit, Sig: check blood sugar bid, please provide relion brand for self pay patient, Disp: 1 Kit, Rfl: 0    pseudoephedrine (SUDAFED) 30 mg tablet, Take 1 Tab by mouth every four (4) hours as needed for Congestion. , Disp: 20 Tab, Rfl: 1    metroNIDAZOLE (FLAGYL) 500 mg tablet, Take 1 Tab by mouth two (2) times a day., Disp: 14 Tab, Rfl: 0    diclofenac EC (VOLTAREN) 50 mg EC tablet, Take 1 Tab by mouth two (2) times a day. Indications: OSTEOARTHRITIS, Disp: 28 Tab, Rfl: 0    prenatal vit-calcium-iron-fa (PRENATAL PLUS) 29 mg iron- 1 mg tab tablet, Take 1 Tab by mouth daily. Please provide $4 formulary, Disp: 90 Tab, Rfl: 1    Arm Brace (WRIST BRACE MEDIUM) misc, Use PRN for wrist pain, Disp: 1 Each, Rfl: 0  Social History: She reports that she has never smoked. She has never used smokeless tobacco. She reports that she does not drink alcohol or use drugs. Family History: Her family history is not on file. Objective:     Vitals:    07/23/19 1315   BP: 114/85   Pulse: 80   Temp: 98.1 °F (36.7 °C)   TempSrc: Oral   SpO2: 98%   Weight: 195 lb (88.5 kg)    Patient's last menstrual period was 06/28/2019. Results for orders placed or performed in visit on 07/23/19   AMB POC GLUCOSE BLOOD, BY GLUCOSE MONITORING DEVICE   Result Value Ref Range    Glucose POC  mg/dL    She was more sad and more angry, now she is less angry. Wt Readings from Last 2 Encounters:   07/23/19 195 lb (88.5 kg)   04/23/19 196 lb (88.9 kg)    Weight decreased;    Hemoglobin A1c   Date Value Ref Range Status   06/25/2019 7.9 (H) 4.2 - 6.3 % Final   04/16/2019 9.1 (H) 4.2 - 6.3 % Final    A1C decreased;   Lab Results   Component Value Date/Time    Microalbumin/Creat ratio (mg/g creat) 11 06/25/2019 09:24 AM    Microalbumin,urine random 0.66 06/25/2019 09:24 AM    Creatinine 0.71 06/25/2019 09:24 AM      Lab Results   Component Value Date/Time    GFR est AA >60 06/25/2019 09:24 AM    GFR est non-AA >60 06/25/2019 09:24 AM       Lab Results   Component Value Date/Time    Cholesterol, total 191 03/12/2018 02:19 PM    HDL Cholesterol 56 03/12/2018 02:19 PM    LDL, calculated 82.8 03/12/2018 02:19 PM    Triglyceride 261 (H) 03/12/2018 02:19 PM    CHOL/HDL Ratio 3.4 03/12/2018 02:19 PM      Lab Results   Component Value Date/Time    ALT (SGPT) 85 (H) 02/26/2018 11:10 AM    AST (SGOT) 43 (H) 02/26/2018 11:10 AM    Alk. phosphatase 106 02/26/2018 11:10 AM    Bilirubin, total 0.4 02/26/2018 11:10 AM     Constitutional: She appears well-developed. Eyes: EOM are normal. Pupils are equal, round, and reactive to light. Neck: Neck supple. No thyromegaly present. Cardiovascular: Normal rate, regular rhythm, normal heart sounds and intact distal pulses. No murmur heard. Pulmonary/Chest: Effort normal and breath sounds normal.   Musculoskeletal: She exhibits no edema. No ulcers of the lower extremities. Assessment/Plan:   Diagnoses and all orders for this visit:    1. Type 2 diabetes mellitus with hyperglycemia, without long-term current use of insulin (HCC)  -     AMB POC GLUCOSE BLOOD, BY GLUCOSE MONITORING DEVICE  -     insulin NPH (NOVOLIN N, HUMULIN N) 100 unit/mL injection; Sig: use 22 units qhs; max daily dose 50 units for TPC  -     metFORMIN ER (GLUCOPHAGE XR) 500 mg tablet; Take 4 Tabs by mouth daily (with dinner). [place] 4 pastillas con hong  -     lisinopril (PRINIVIL, ZESTRIL) 5 mg tablet; Take 1 Tab by mouth daily. For your kidneys. [place] sig    2. Acquired hypothyroidism  -     levothyroxine (SYNTHROID) 88 mcg tablet; Take 1 Tab by mouth Daily (before breakfast). For thyroid. [place] sig      Diabetes Mellitus type 2, under Fair control. Blood pressure under Fair control. Greater than 50% of this 25 minute visit was spent in face-to-face counseling/coordination of care regarding diabetes management. Return 3 months.        Arielle Degroot DNP, FNP-BC, BC-ADM [place] expressed understanding of this plan.

## 2019-07-23 NOTE — PROGRESS NOTES
Coordination of Care  1. Have you been to the ER, urgent care clinic since your last visit? Hospitalized since your last visit? No    2. Have you seen or consulted any other health care providers outside of the [address] [place] [address] since your last visit? Include any pap smears or colon screening. No    Does the patient need refills? YES    Learning Assessment Complete?  yes  Depression Screening complete in the past 12 months? yes     Results for orders placed or performed in visit on 07/23/19   AMB POC GLUCOSE BLOOD, BY GLUCOSE MONITORING DEVICE   Result Value Ref Range    Glucose POC  mg/dL

## 2019-07-23 NOTE — PATIENT INSTRUCTIONS
Stop glipizide 5mg. Take NPH insulin every night before bed 22 units. Continue lisinopril 5 mg. Continue metformin 500mg ER 4 tablets. Return 2 months fasting labs. Return 3 months LK appointment.

## 2019-07-26 ENCOUNTER — DOCUMENTATION ONLY (OUTPATIENT)
Dept: FAMILY MEDICINE CLINIC | Age: 39
End: 2019-07-26

## 2019-07-26 DIAGNOSIS — E11.65 TYPE 2 DIABETES MELLITUS WITH HYPERGLYCEMIA, WITHOUT LONG-TERM CURRENT USE OF INSULIN (HCC): Primary | ICD-10-CM

## 2019-07-26 NOTE — PROGRESS NOTES
Receipt of patient's PAP order of 2 vials of Humulin N, 100u/ml, 10ml/vial, has been logged into the log book. This is a supplemental shipment to the 4 vials that we received on 07-12-19. Routing to Jovani Hernandez for dose confirmation and permission to deliver to the patient.  Felicia Norton RN    insulin NPH (NOVOLIN N, HUMULIN N) 100 unit/mL injection [[id number]]     Order Details   Dose, Route, Frequency: As Directed    Dispense Quantity: 1 Vial Refills: 2 Fills remaining: --           Sig: Sig: use 22 units qhs; max daily dose 50 units for TPC          Written Date: 07/23/19 Expiration Date: --     Start Date: 07/23/19 End Date: --            Ordering Provider: -- PRESTON #:  -- NPI:  --

## 2019-07-26 NOTE — PROGRESS NOTES
Patient will return fasting in 2 months for labs as ordered. Diagnoses and all orders for this visit:    1. Type 2 diabetes mellitus with hyperglycemia, without long-term current use of insulin (HCC)  -     LIPID PANEL; Future  -     HEMOGLOBIN A1C WITH EAG; Future      Has follow up appointment scheduled for 3 months.

## 2019-07-26 NOTE — PROGRESS NOTES
OK to deliver as ordered.   Keren Malik, DNP, FNP-BC, BC-ADM  Board Certified in Advanced Diabetes Management

## 2019-08-05 NOTE — PROGRESS NOTES
Telephone call made to the patient with assistance from [employer]  #[phone] to schedule delivery of her supplemental PAP insulin order. Patient stated she will come to [facility], 08-06-19, as a NV between 9am and 3pm to  her insulin.  Mounika Ragland RN

## 2019-08-06 ENCOUNTER — CLINICAL SUPPORT (OUTPATIENT)
Dept: FAMILY MEDICINE CLINIC | Age: 39
End: 2019-08-06

## 2019-08-06 DIAGNOSIS — Z71.9 COUNSELED BY NURSE: Primary | ICD-10-CM

## 2019-08-06 NOTE — PROGRESS NOTES
Nurse called patient at [phone] from the waiting area, no answer. 1:50 PM patient was called from waiting area, no answer. Per chart reviewed, patient was here to  her PAP insulin, unfortunately patient left clinic before nurse was able to give her the PAP medication. [facility] PAP coordinator contacted and she will try to reach patient to reschedule PAP . No other actions needed for this nurse.   Alonso Crook RN

## 2019-08-14 ENCOUNTER — TELEPHONE (OUTPATIENT)
Dept: FAMILY MEDICINE CLINIC | Age: 39
End: 2019-08-14

## 2019-08-14 NOTE — TELEPHONE ENCOUNTER
Telephone call made to the patient with assistance from [employer]  #[phone] to reschedule delivery of her PAP insulin (supplemental) order. Patient stated she had another appointment last week and that's why she left the clinic without receiving her insulin. She stated that she waited for over one hour. Patient works on Saturdays. She will come to [facility] on Tuesday, 08-20-19, as a NV between 9am and 3pm and will let the registrar know if she needs to leave by a certain time. Patient stated that she also has to take her child to an appointment that same day.  Camden Kamara RN

## 2019-08-22 ENCOUNTER — TELEPHONE (OUTPATIENT)
Dept: FAMILY MEDICINE CLINIC | Age: 39
End: 2019-08-22

## 2019-08-22 NOTE — TELEPHONE ENCOUNTER
Patient did not  her PAP insulin order at [facility] on 08-20-19 as scheduled. Telephone call made to the patient today with assistance from [employer]  #[phone] to let her know that her insulin order is at our [facility] clinic today if she is available to pick it up, or to reschedule the delivery for another day. There was no answer, so a message was left asking for a return call to nurse Duarte at the [facility] [place] office, [phone], option #4.  Diana Nelson RN

## 2019-08-30 ENCOUNTER — DOCUMENTATION ONLY (OUTPATIENT)
Dept: FAMILY MEDICINE CLINIC | Age: 39
End: 2019-08-30

## 2019-08-30 NOTE — PROGRESS NOTES
Several attempts made in August to deliver the rest of patient's PAP insulin order, 2 vials of Humulin N, 100u/ml, 10 ml/vial. Patient did not come to clinic on 08-06-19, 08-08-19, or 08-20-19 to  her medication. No telephone call to reschedule delivery received from the patient. Planning to deliver the 2 vials to patient's appointment with [facility] on 10-29-19, [place], 1:30pm unless I hear from her before.  Patrizia Sen RN

## 2019-09-30 ENCOUNTER — LAB ONLY (OUTPATIENT)
Dept: FAMILY MEDICINE CLINIC | Age: 39
End: 2019-09-30

## 2019-09-30 ENCOUNTER — HOSPITAL ENCOUNTER (OUTPATIENT)
Dept: LAB | Age: 39
Discharge: HOME | End: 2019-09-30

## 2019-09-30 DIAGNOSIS — E11.65 TYPE 2 DIABETES MELLITUS WITH HYPERGLYCEMIA, WITHOUT LONG-TERM CURRENT USE OF INSULIN (HCC): ICD-10-CM

## 2019-09-30 LAB
CHOLEST SERPL-MCNC: 212 MG/DL
EST. AVERAGE GLUCOSE BLD GHB EST-MCNC: 183 MG/DL
HBA1C MFR BLD: 8 % (ref 4.2–6.3)
HDLC SERPL-MCNC: 51 MG/DL
HDLC SERPL: 4.2 {RATIO} (ref 0–5)
LDLC SERPL CALC-MCNC: 127.2 MG/DL (ref 0–100)
LIPID PROFILE,FLP: ABNORMAL
TRIGL SERPL-MCNC: 169 MG/DL (ref ?–150)
VLDLC SERPL CALC-MCNC: 33.8 MG/DL

## 2019-09-30 PROCEDURE — 83036 HEMOGLOBIN GLYCOSYLATED A1C: CPT

## 2019-09-30 PROCEDURE — 80061 LIPID PANEL: CPT

## 2019-10-28 ENCOUNTER — TELEPHONE (OUTPATIENT)
Dept: FAMILY MEDICINE CLINIC | Age: 39
End: 2019-10-28

## 2019-10-28 NOTE — TELEPHONE ENCOUNTER
Patient was a No-Show x2  In August to  her supplemental PAP insulin order. She has an appointment with Francisco Cardenas tomorrow, 10-29-19, at [facility]. The supplemental order of 2 vials of Humulin N will be delivered to the clinic for the patient's appointment. Refill needs can be discussed after the patient's provider visit.  Melvin Negron RN

## 2019-10-28 NOTE — TELEPHONE ENCOUNTER
Marcella Garay  Call main office 10/28/19 saying that provider told her that is she needs more insulin when she didn't have any more she could just call the office and ask to see if a nurse can order more insulin .     Thank you

## 2019-10-29 ENCOUNTER — OFFICE VISIT (OUTPATIENT)
Dept: FAMILY MEDICINE CLINIC | Age: 39
End: 2019-10-29

## 2019-10-29 ENCOUNTER — TELEPHONE (OUTPATIENT)
Dept: FAMILY MEDICINE CLINIC | Age: 39
End: 2019-10-29

## 2019-10-29 VITALS
WEIGHT: 193.2 LBS | OXYGEN SATURATION: 98 % | HEART RATE: 82 BPM | BODY MASS INDEX: 38.95 KG/M2 | HEIGHT: 59 IN | TEMPERATURE: 98.4 F | DIASTOLIC BLOOD PRESSURE: 68 MMHG | SYSTOLIC BLOOD PRESSURE: 107 MMHG

## 2019-10-29 DIAGNOSIS — E03.9 ACQUIRED HYPOTHYROIDISM: ICD-10-CM

## 2019-10-29 DIAGNOSIS — E11.65 TYPE 2 DIABETES MELLITUS WITH HYPERGLYCEMIA, WITHOUT LONG-TERM CURRENT USE OF INSULIN (HCC): ICD-10-CM

## 2019-10-29 DIAGNOSIS — R05.9 COUGH: Primary | ICD-10-CM

## 2019-10-29 DIAGNOSIS — M25.522 LEFT ELBOW PAIN: ICD-10-CM

## 2019-10-29 LAB — GLUCOSE POC: NORMAL MG/DL

## 2019-10-29 RX ORDER — LORATADINE 10 MG/1
10 TABLET ORAL DAILY
Qty: 30 TAB | Refills: 0 | Status: SHIPPED | OUTPATIENT
Start: 2019-10-29 | End: 2020-05-19 | Stop reason: SDUPTHER
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
## 2019-10-29 NOTE — PROGRESS NOTES
Coordination of Care  1. Have you been to the ER, urgent care clinic since your last visit? Hospitalized since your last visit? No    2. Have you seen or consulted any other health care providers outside of the [address] [place] [address] since your last visit? Include any pap smears or colon screening. No    Does the patient need refills? NO    Learning Assessment Complete?  yes  Depression Screening complete in the past 12 months? yes   Results for orders placed or performed in visit on 10/29/19   AMB POC GLUCOSE BLOOD, BY GLUCOSE MONITORING DEVICE   Result Value Ref Range    Glucose POC  mg/dL

## 2019-10-29 NOTE — PROGRESS NOTES
Assessment/Plan:       ICD-10-CM ICD-9-CM    1. Cough R05 786.2 loratadine (CLARITIN) 10 mg tablet   2. Type 2 diabetes mellitus with hyperglycemia, without long-term current use of insulin ([facility]) E11.65 250.00 AMB POC GLUCOSE BLOOD, BY GLUCOSE MONITORING DEVICE     790.29    3. Left elbow pain M25.522 719.42 XR ELBOW LT MIN 3 V   4. Acquired hypothyroidism E03.9 244.9    Has been having dry cough. She was on 5 mg lisinopril; this was stopped as blood pressure was at lower end of normal, to re-evaluate if dry cough has improved with stopping lisinopril and if blood pressure remains controlled. Follow-up and Dispositions    · Return in about 2 months (around 12/29/2019) for 1-2 months to check on stopping lisinopril and see if cough has subsided. She has 2 girls, age 9 and 8. One of her girls has urinary retention along with frequent UTIs, on miralax for probable constipation. [address] [place] [address]  Phone: [phone] Fax: [phone]      [facility]- [address]  Subjective:     Chief Complaint   Patient presents with    Follow-up     diabetes    Arm Injury     pt c/o fell 1 month ago. left arm    Nicolás Marie is a 35 y.o. OTHER female who speaks Lithuanian.  used? yes   HPI: taking aleve which helps. Diabetes   Brought in medications? no   Regarding picking up insulin, she states that she was here once to  the insulin, was here 1 hour and then left. Last took medications: last took meds yesterday. Taking medications as prescribed? Not sure, not clear how often she takes insulin. Last ate: today   Working: did not discuss   Physical Activity? Did not discuss  Measuring glucoses? No  Arm Injury  She was dizzy and she fell. She had outstretched arm. This was 6 weeks ago. She does not check her blood glucoses.   ROS:     No increased frequency of urination,   no increased thirst;   no chest pain, dyspnea or TIA's;   no numbness, tingling or pain in extremities;   no reports of hypoglycemia. Medications:    Current Outpatient Medications:     loratadine (CLARITIN) 10 mg tablet, Take 1 Tab by mouth daily. For allergies; Gina 1 tab diario para alergias, Disp: 30 Tab, Rfl: 0    insulin syringe-needle U-100 1/2 mL 31 gauge x 15/64\" syrg, 1 Syringe by Does Not Apply route daily. , Disp: 90 Pen Needle, Rfl: 3    insulin NPH (NOVOLIN N, HUMULIN N) 100 unit/mL injection, Sig: use 22 units qhs; max daily dose 50 units for TPC, Disp: 1 Vial, Rfl: 2    metFORMIN ER (GLUCOPHAGE XR) 500 mg tablet, Take 4 Tabs by mouth daily (with dinner). [place] 4 pastillas con hong, Disp: 360 Tab, Rfl: 1    levothyroxine (SYNTHROID) 88 mcg tablet, Take 1 Tab by mouth Daily (before breakfast). For thyroid. Upper sorbian sig, Disp: 90 Tab, Rfl: 1    Blood-Glucose Meter monitoring kit, Sig: check blood sugar bid, please provide relion brand for self pay patient, Disp: 1 Kit, Rfl: 0    prenatal vit-calcium-iron-fa (PRENATAL PLUS) 29 mg iron- 1 mg tab tablet, Take 1 Tab by mouth daily. Please provide $4 formulary, Disp: 90 Tab, Rfl: 1    Arm Brace (WRIST BRACE MEDIUM) misc, Use PRN for wrist pain, Disp: 1 Each, Rfl: 0  Social History: She reports that she has never smoked. She has never used smokeless tobacco. She reports that she does not drink alcohol or use drugs. Family History: Her family history is not on file. Objective:     Vitals:    10/29/19 1326   BP: 107/68   Pulse: 82   Temp: 98.4 °F (36.9 °C)   TempSrc: Temporal   SpO2: 98%   Weight: 193 lb 3.2 oz (87.6 kg)   Height: 4' 11.13\" (1.502 m)    Patient's last menstrual period was 10/18/2019.     Results for orders placed or performed in visit on 10/29/19   AMB POC GLUCOSE BLOOD, BY GLUCOSE MONITORING DEVICE   Result Value Ref Range    Glucose POC  mg/dL      Wt Readings from Last 2 Encounters:   10/29/19 193 lb 3.2 oz (87.6 kg)   07/23/19 195 lb (88.5 kg)    Weight decreased; Hemoglobin A1c   Date Value Ref Range Status   09/30/2019 8.0 (H) 4.2 - 6.3 % Final   06/25/2019 7.9 (H) 4.2 - 6.3 % Final    A1C increased;   Lab Results   Component Value Date/Time    Microalbumin/Creat ratio (mg/g creat) 11 06/25/2019 09:24 AM    Microalbumin,urine random 0.66 06/25/2019 09:24 AM    Creatinine 0.71 06/25/2019 09:24 AM      Lab Results   Component Value Date/Time    GFR est AA >60 06/25/2019 09:24 AM    GFR est non-AA >60 06/25/2019 09:24 AM       Lab Results   Component Value Date/Time    Cholesterol, total 212 (H) 09/30/2019 10:42 PM    HDL Cholesterol 51 09/30/2019 10:42 PM    LDL, calculated 127.2 (H) 09/30/2019 10:42 PM    Triglyceride 169 (H) 09/30/2019 10:42 PM    CHOL/HDL Ratio 4.2 09/30/2019 10:42 PM      Lab Results   Component Value Date/Time    ALT (SGPT) 85 (H) 02/26/2018 11:10 AM    AST (SGOT) 43 (H) 02/26/2018 11:10 AM    Alk. phosphatase 106 02/26/2018 11:10 AM    Bilirubin, total 0.4 02/26/2018 11:10 AM     Physical Exam:  Constitutional: She appears well-developed. Soft tissue swelling noted. Eyes: EOM are normal. Pupils are equal, round, and reactive to light. Neck: Neck supple. No thyromegaly present. Cardiovascular: Normal rate, regular rhythm, normal heart sounds and intact distal pulses. No murmur heard. Pulmonary/Chest: Effort normal and breath sounds normal. Lungs CTAB. Musculoskeletal: She exhibits no edema. No ulcers of the lower extremities. There is tenderness in the proximal forearm with no crepitus or disfigurement. Distal sensation intact, pulses intact. FROM including flexion and extension of elbow. Assessment/Plan:   Diagnoses and all orders for this visit:    1. Cough  -     loratadine (CLARITIN) 10 mg tablet; Take 1 Tab by mouth daily. For allergies; Gina 1 tab diario para alergias    2. Type 2 diabetes mellitus with hyperglycemia, without long-term current use of insulin ([facility])  -     AMB POC GLUCOSE BLOOD, BY GLUCOSE MONITORING DEVICE    3. Left elbow pain  -     XR ELBOW LT MIN 3 V; Future    4. Acquired hypothyroidism      Diabetes Mellitus type 2, under Poor control. Blood pressure under Good control. Follow-up and Dispositions    · Return in about 2 months (around 12/29/2019) for 1-2 months to check on stopping lisinopril and see if cough has subsided. Aurora Herrera, BARBRA, FNP-BC, BC-ADM  Michelle Ricardo expressed understanding of this plan.

## 2019-10-29 NOTE — PROGRESS NOTES
Check-out Note: To see Lance Zuñiga ASAP regarding her daughter with chronic UTI.       2 vials of Humulin N from PAP/TPC given to patient per policy and protocol and provider order, insulin dose reviewed and patient verbalizes right dose. PAP receipt paper was completed, reviewed and signed by patient and will send to main office to Xochitl Lao PAP coordinator. Patient verbalizes that she will be needing a refill on her insulin in about 2 months- instructed patient to go ahead and call for refills as shipment of PAP/TPC can take up to 6 weeks. Felicia biggs RN Contact information given and while in the clinic nurse demonstrated to patient how to call our office and Felicia was contacted with patient present and refill requested for Humulin N.  Nurse also reinforce with patient that it is very important to show up when it has been arranged for her to come for insulin . 2 bags of syringes given per patient's request and a prescription request has also been routed to the provider for insulin syringes. Reviewed AVS, prescription and pharmacy location with patient. AVS printed and given. Patient aware that provider would like to follow up about today's visit in 1-2 months with an appointment walk in x-ray and financial assistance process explained and  Care card application given. Patient reqeusting medication for inflation and pain  This has been fully explained to the patient, who indicates understanding and agrees with plan. No further questions at this time.  Kamran Cheung RN

## 2019-10-30 ENCOUNTER — PATIENT OUTREACH (OUTPATIENT)
Dept: FAMILY MEDICINE CLINIC | Age: 39
End: 2019-10-30

## 2019-10-30 DIAGNOSIS — E11.65 TYPE 2 DIABETES MELLITUS WITH HYPERGLYCEMIA, WITHOUT LONG-TERM CURRENT USE OF INSULIN (HCC): Primary | ICD-10-CM

## 2019-10-30 RX ORDER — SYRINGE AND NEEDLE,INSULIN,1ML 31GX15/64"
1 SYRINGE, EMPTY DISPOSABLE MISCELLANEOUS DAILY
Qty: 90 PEN NEEDLE | Refills: 3 | Status: SHIPPED | OUTPATIENT
Start: 2019-10-30 | End: 2021-01-14 | Stop reason: SDUPTHER

## 2019-10-30 NOTE — PROGRESS NOTES
Note received that patient needs insulin syringes sent to her pharmacy. Order placed. Diagnoses and all orders for this visit:    1. Type 2 diabetes mellitus with hyperglycemia, without long-term current use of insulin (HCC)  -     insulin syringe-needle U-100 1/2 mL 31 gauge x 15/64\" syrg; 1 Syringe by Does Not Apply route daily.

## 2019-11-04 ENCOUNTER — DOCUMENTATION ONLY (OUTPATIENT)
Dept: FAMILY MEDICINE CLINIC | Age: 39
End: 2019-11-04

## 2019-11-04 NOTE — PROGRESS NOTES
Refill request for Humulin N vials, 50 units daily, was faxed to [facility] today. Fax confirmation received.  Rmoa Mao RN

## 2019-11-15 ENCOUNTER — DOCUMENTATION ONLY (OUTPATIENT)
Dept: FAMILY MEDICINE CLINIC | Age: 39
End: 2019-11-15

## 2019-11-15 NOTE — PROGRESS NOTES
Receipt of patient's PAP order of 4 vials of Humulin N, 100units/ml, 10 ml/vial, has been entered into the logbook. Per the prescription on file with [facility], they should have sent 6 vials. I will call them next week about this. Routing to [employer] for dose confirmation and permission to deliver to the patient.  Felicia Norton RN    insulin NPH (NOVOLIN N, HUMULIN N) 100 unit/mL injection [[id number]]     Order Details   Dose, Route, Frequency: As Directed    Dispense Quantity: 1 Vial Refills: 2 Fills remaining: --           Sig: Sig: use 22 units qhs; max daily dose 50 units for TPC          Written Date: 07/23/19 Expiration Date: --     Start Date: 07/23/19 End Date: --            Ordering Provider: -- PRESTON #:  -- NPI:  --    Authorizing Provider: Yanique Addison NP PRESTON #:  [id number] NPI:  [id number]    Ordering User:  Yanique Addison NP

## 2019-11-19 NOTE — PROGRESS NOTES
Telephone call made to the patient with assistance from [employer]  #[id number] to schedule delivery of her PAP order of insulin.  The patient stated that she has enough insulin at home to wait until her provider appointment on 12-17-19 at [facility] to  her PAP order. (appointment is at 1:30pm with [facility] on 12-17-19)Felicia Vargas RN

## 2019-12-03 ENCOUNTER — OFFICE VISIT (OUTPATIENT)
Dept: FAMILY MEDICINE CLINIC | Age: 39
End: 2019-12-03

## 2019-12-03 DIAGNOSIS — Z78.9 NEEDS PARENTING SUPPORT AND EDUCATION: Primary | ICD-10-CM

## 2019-12-03 NOTE — PROGRESS NOTES
INITIAL SESSION    Client presenting for difficulties with parenting effectively with 8year old daughter when daughter is non-compliant. Client's agitation then interferes with her being able to maintain healthy blood sugars for her diabetes. Clinician provided opportunity for emotional expression; completed bio-psychosocial spiritual assessment. Brynn Lockhart lives with two daughters(the younger one is [de-identified]) and current  (who is father of younger daughter, but not of older one. 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 Angela able to identify things she already does to relax. Clinician facilitated exploration into other things things she might try such as intentional breathing, getting outside and walking more with kids and  She stated that she and  do not do anything alone as a couple; they are always with the girls. The reasons for this are not having family to babysit and Brynn Lockhart not wanting to leave her children with anyone she does not know. Clinician also worked with Brynn Lockhart to be able to take a minute upon arriving home from work to shift her focus and go into the house calmly. Angela expressed understanding and gratitude for the support. She identified feeling better after the session. She did not need a follow up appointment, but agreed to call as needed. Clinician reviewed with her to be able to tell when it might be time to call for follow up appointment.

## 2019-12-06 ENCOUNTER — DOCUMENTATION ONLY (OUTPATIENT)
Dept: FAMILY MEDICINE CLINIC | Age: 39
End: 2019-12-06

## 2019-12-06 NOTE — PROGRESS NOTES
Receipt of patient's supplemental order from PAP of 2 vials of Humulin N, 100 units/ml, 10 ml/vial, has been entered into the logbook. (Her PAP shipment last month was 2 vials short based on her current prescription.) Routing to [employer] for dose confirmation and permission to deliver to the patient.  Felicia Norton RN    insulin NPH (NOVOLIN N, HUMULIN N) 100 unit/mL injection [[id number]]     Order Details   Dose, Route, Frequency: As Directed    Dispense Quantity: 1 Vial Refills: 2 Fills remaining: --           Sig: Sig: use 22 units qhs; max daily dose 50 units for TPC          Written Date: 07/23/19 Expiration Date: --     Start Date: 07/23/19 End Date: --            Ordering Provider: -- PRESTON #:  -- NPI:  --    Authorizing Provider: Alanis Carias NP PRESTON #:  [id number] NPI:  [id number]

## 2019-12-16 ENCOUNTER — HOSPITAL ENCOUNTER (OUTPATIENT)
Dept: MAMMOGRAPHY | Age: 39
Discharge: HOME | End: 2019-12-16

## 2019-12-16 ENCOUNTER — HOSPITAL ENCOUNTER (OUTPATIENT)
Dept: LAB | Age: 39
Discharge: HOME | End: 2019-12-16

## 2019-12-16 ENCOUNTER — OFFICE VISIT (OUTPATIENT)
Dept: FAMILY PLANNING/WOMEN'S HEALTH CLINIC | Age: 39
End: 2019-12-16

## 2019-12-16 DIAGNOSIS — Z12.31 VISIT FOR SCREENING MAMMOGRAM: ICD-10-CM

## 2019-12-16 DIAGNOSIS — Z01.419 ENCOUNTER FOR WELL WOMAN EXAM WITH ROUTINE GYNECOLOGICAL EXAM: Primary | ICD-10-CM

## 2019-12-16 PROCEDURE — 77067 SCR MAMMO BI INCL CAD: CPT

## 2019-12-16 PROCEDURE — 88175 CYTOPATH C/V AUTO FLUID REDO: CPT

## 2019-12-16 NOTE — PROGRESS NOTES
EVERY WOMANS LIFE HISTORY QUESTIONNAIRE       No Yes Comments   Has a doctor ever seen or felt anything wrong with your breast? [x]                                  []                                     Have you ever had a breast biopsy? [x]                                  []                                          When and where was last mammogram performed? 1st one    Have you ever been told that there was a problem on your mammogram?   No Yes Comments   []                                  []                                  n/a     Do you have breast implants? No Yes Comments   [x]                                  []                                       When was your last Pap test performed? 6/7/2016    Have you ever had an abnormal Pap test?   No Yes Comments   [x]                                  []                                       Have you had a hysterectomy? No Yes Comments (why)   [x]                                  []                                       Have you been through menopause? No Yes Date of LMP   [x]                                  []                                  12/14/19     Did your mother take HOWARD? No Yes Unknown   [x]                                  []                                       Do you have a history of HIV exposure? No Yes    [x]                                  []                                       Have you ever been diagnosed with any type of Cancer   No Yes Comments (type,when,where,type of treatment   [x]                                  []                                          Has a family member been diagnosed with breast or ovarian cancer?    No Yes Comments (which family members, and type   []                                  [x]                                  Dad's sister had breast - he has 3 sisters - aunt was dx at 61 and is now 59 doing well     Are you taking hormone replacement therapy (HRT)     No Yes Comments   [x] []                                       How many times have you been pregnant? 3      Number of live births ? 3    Are you experiencing any of the following? No Yes Comments   Nipple Discharge [x]                                  []                                     Breast Lump/Masses [x]                                  []                                     Breast Skin Changes [x]                                  []                                          No Yes Comments   Vaginal Discharge [x]                                  []                                     Abnormal/unusual vaginal bleeding [x]                                  []                                         Are you experiencing any other health problems? Age at first period?  14  Age at first birth?  21    Ht-- 5-1    Wt-- 194

## 2019-12-16 NOTE — PROGRESS NOTES
Assessment/Plan:    Diagnoses and all orders for this visit:    1. Encounter for well woman exam with routine gynecological exam  -     PAP IG, RFX APTIMA HPV ASCUS ([id number])); Future            Salbador Rushing McFerren, PA-C Elwanda Skiff expressed understanding of this plan. An AVS was printed and given to the patient.      ----------------------------------------------------------------------    Chief Complaint   Patient presents with    Well Woman     EWL visit       History of Present Illness:  , states has had sterilization procedure in 2013  , no DV  No problem with       Past Medical History:   Diagnosis Date    Diabetes ([facility] [address]     GDM (gestational diabetes mellitus)     IUD (intrauterine device) since         Current Outpatient Medications   Medication Sig Dispense Refill    insulin syringe-needle U-100 1/2 mL 31 gauge x 15/64\" syrg 1 Syringe by Does Not Apply route daily. 90 Pen Needle 3    loratadine (CLARITIN) 10 mg tablet Take 1 Tab by mouth daily. For allergies; Gina 1 tab diario para alergias 30 Tab 0    insulin NPH (NOVOLIN N, HUMULIN N) 100 unit/mL injection Sig: use 22 units qhs; max daily dose 50 units for TPC 1 Vial 2    metFORMIN ER (GLUCOPHAGE XR) 500 mg tablet Take 4 Tabs by mouth daily (with dinner). [place] 4 pastillas con hong 360 Tab 1    levothyroxine (SYNTHROID) 88 mcg tablet Take 1 Tab by mouth Daily (before breakfast). For thyroid. [place]ish sig 90 Tab 1    Blood-Glucose Meter monitoring kit Sig: check blood sugar bid, please provide relion brand for self pay patient 1 Kit 0    prenatal vit-calcium-iron-fa (PRENATAL PLUS) 29 mg iron- 1 mg tab tablet Take 1 Tab by mouth daily.  Please provide $4 formulary 90 Tab 1    Arm Brace (WRIST BRACE MEDIUM) misc Use PRN for wrist pain 1 Each 0       No Known Allergies    Social History     Tobacco Use    Smoking status: Never Smoker    Smokeless tobacco: Never Used   Substance Use Topics    Alcohol use: No     Alcohol/week: 0.0 standard drinks    Drug use: No       No family history on file. Physical Exam:     There were no vitals taken for this visit.     A&Ox3  WDWN NAD  Respirations normal and non labored

## 2019-12-17 ENCOUNTER — OFFICE VISIT (OUTPATIENT)
Dept: FAMILY MEDICINE CLINIC | Age: 39
End: 2019-12-17

## 2019-12-17 VITALS
SYSTOLIC BLOOD PRESSURE: 137 MMHG | DIASTOLIC BLOOD PRESSURE: 89 MMHG | TEMPERATURE: 97.6 F | HEART RATE: 84 BPM | OXYGEN SATURATION: 98 % | BODY MASS INDEX: 38.48 KG/M2 | WEIGHT: 191.4 LBS

## 2019-12-17 DIAGNOSIS — Z23 ENCOUNTER FOR IMMUNIZATION: ICD-10-CM

## 2019-12-17 DIAGNOSIS — E11.65 TYPE 2 DIABETES MELLITUS WITH HYPERGLYCEMIA, WITHOUT LONG-TERM CURRENT USE OF INSULIN (HCC): Primary | ICD-10-CM

## 2019-12-17 LAB — GLUCOSE POC: NORMAL MG/DL

## 2019-12-17 NOTE — PROGRESS NOTES
Humulin N insulin from PAP/TPC given to patient per provider's order and slips signed, will send to PAP/TPC CVAN coordinator. Patient verbalizes the right dose. AVS printed, given and reviewed. Patient aware that provider would like to follow up about today's visit in 3 months with an appt. Ans 2 weeks prior for labs. This has been fully explained to the patient, who indicates understanding and agrees with plan. No further questions at this time.  Kelton Sims RN

## 2019-12-17 NOTE — PROGRESS NOTES
Assessment/Plan:       ICD-10-CM ICD-9-CM    1. Type 2 diabetes mellitus with hyperglycemia, without long-term current use of insulin (HCC) E11.65 250.00 AMB POC GLUCOSE BLOOD, BY GLUCOSE MONITORING DEVICE     790.29    2. Encounter for immunization Z23 V03.89 INFLUENZA VIRUS VAC QUAD,SPLIT,PRESV FREE SYRINGE IM      Follow-up and Dispositions    · Return in about 4 months (around 4/17/2020) for and labs 2 weeks before. Follow up appointment: 3 months  Follow up for labs: 2 weeks before  Changes made today: none    [address] [facility], [address] [place] [address]  Phone: [phone] Fax: [phone]      CV[address]  Subjective:     Chief Complaint   Patient presents with    Diabetes     f/u    Immunization/Injection    Jessie Flores is a 35 y.o. OTHER female who speaks Filipino.  used? yes   HPI:   Results for orders placed or performed in visit on 12/17/19   AMB POC GLUCOSE BLOOD, BY GLUCOSE MONITORING DEVICE   Result Value Ref Range    Glucose POC  mg/dL   Last took metformin yesterday. Last put insulin 2 days ago. She forgot. Wt Readings from Last 2 Encounters:   12/17/19 191 lb 6.4 oz (86.8 kg)   10/29/19 193 lb 3.2 oz (87.6 kg)    Weight not significantly changed  Hemoglobin A1c   Date Value Ref Range Status   09/30/2019 8.0 (H) 4.2 - 6.3 % Final   06/25/2019 7.9 (H) 4.2 - 6.3 % Final    A1C not significantly changed  Diabetes   Last took medications: yesterday  Last ate: today  Eating healthy?  no  Lifestyle  Working: yes   Physical Activity? going up and down stairs while at work  Measuring glucoses? 137, 140 fasting; after eating 197, 200. Other symptoms and concerns: None    Medications:    Current Outpatient Medications   Medication Sig Dispense    insulin syringe-needle U-100 1/2 mL 31 gauge x 15/64\" syrg 1 Syringe by Does Not Apply route daily.  90 Pen Needle    loratadine (CLARITIN) 10 mg tablet Take 1 Tab by mouth daily. For allergies; Gina 1 tab diario para alergias 30 Tab    insulin NPH (NOVOLIN N, HUMULIN N) 100 unit/mL injection Sig: use 22 units qhs; max daily dose 50 units for TPC 1 Vial    metFORMIN ER (GLUCOPHAGE XR) 500 mg tablet Take 4 Tabs by mouth daily (with dinner). [place] 4 pastillas con hong 360 Tab    levothyroxine (SYNTHROID) 88 mcg tablet Take 1 Tab by mouth Daily (before breakfast). For thyroid. [place] sig 90 Tab    Blood-Glucose Meter monitoring kit Sig: check blood sugar bid, please provide relion brand for self pay patient 1 Kit    prenatal vit-calcium-iron-fa (PRENATAL PLUS) 29 mg iron- 1 mg tab tablet Take 1 Tab by mouth daily. Please provide $4 formulary 90 Tab    Arm Brace (WRIST BRACE MEDIUM) misc Use PRN for wrist pain 1 Each     No current facility-administered medications for this visit. Taking medications as prescribed? Except that she forgets insulin at times   Social History: She reports that she has never smoked. She has never used smokeless tobacco. She reports that she does not drink alcohol or use drugs. Family History: Her family history is not on file. ROS:     No increased frequency of urination,   no increased thirst;   no chest pain, dyspnea or TIA's;   no numbness, tingling or pain in extremities;   no reports of hypoglycemia. Social History: She reports that she has never smoked. She has never used smokeless tobacco. She reports that she does not drink alcohol or use drugs. Family History: Her family history is not on file. Objective:     Vitals:    12/17/19 1323   BP: 137/89   Pulse: 84   Temp: 97.6 °F (36.4 °C)   TempSrc: Oral   SpO2: 98%   Weight: 191 lb 6.4 oz (86.8 kg)    Patient's last menstrual period was 12/13/2019 (exact date).     Results for orders placed or performed in visit on 12/17/19   AMB POC GLUCOSE BLOOD, BY GLUCOSE MONITORING DEVICE   Result Value Ref Range    Glucose POC  mg/dL    [place] and coffee at 12 pm.  Before that coffee and milk and regular sugar. Physical Exam:  Constitutional: She appears well-developed. Eyes: EOM are normal. Pupils are equal, round, and reactive to light. Neck: Neck supple. No thyromegaly present. Cardiovascular: Normal rate, regular rhythm, normal heart sounds and intact distal pulses. No murmur heard. Pulmonary/Chest: Effort normal and breath sounds normal.   Musculoskeletal: She exhibits no edema. No ulcers of the lower extremities. Lab Results   Component Value Date/Time    Cholesterol, total 212 (H) 09/30/2019 10:42 PM    HDL Cholesterol 51 09/30/2019 10:42 PM    LDL, calculated 127.2 (H) 09/30/2019 10:42 PM    Triglyceride 169 (H) 09/30/2019 10:42 PM    CHOL/HDL Ratio 4.2 09/30/2019 10:42 PM     Lab Results   Component Value Date/Time    ALT (SGPT) 85 (H) 02/26/2018 11:10 AM    AST (SGOT) 43 (H) 02/26/2018 11:10 AM    Alk. phosphatase 106 02/26/2018 11:10 AM    Bilirubin, total 0.4 02/26/2018 11:10 AM     Lab Results   Component Value Date/Time    Microalbumin/Creat ratio (mg/g creat) 11 06/25/2019 09:24 AM    Microalbumin,urine random 0.66 06/25/2019 09:24 AM    Creatinine 0.71 06/25/2019 09:24 AM     Lab Results   Component Value Date/Time    GFR est AA >60 06/25/2019 09:24 AM    GFR est non-AA >60 06/25/2019 09:24 AM     Assessment/Plan:   Diagnoses and all orders for this visit:    1. Type 2 diabetes mellitus with hyperglycemia, without long-term current use of insulin (HCC)  -     AMB POC GLUCOSE BLOOD, BY GLUCOSE MONITORING DEVICE    2. Encounter for immunization  -     INFLUENZA VIRUS VAC QUAD,SPLIT,PRESV FREE SYRINGE IM      Diabetes Mellitus type 2, under Fair control. Blood pressure under Good control. Greater than 50% of this 25 minute visit was spent in face-to-face counseling/coordination of care regarding diabetes management. Follow-up and Dispositions    · Return in about 4 months (around 4/17/2020) for and labs 2 weeks before.         Sean Dickerson, BARBRA, FNP-BC, BC-ADM  Avel Lopez DANIELLE Kearney expressed understanding of this plan.

## 2019-12-17 NOTE — PROGRESS NOTES
Lilly Nixon  Requests flu vaccine. Denies fever and egg allergy. VIS information sheet given to patient. Explained possible s/e. Reviewed s/sx indicating need to be seen in ER. Patient had no adverse reaction at time of discharge. Entered into VIIS. GIVEN BY RIMMA FRANKEL, [facility], ROLY.  Julia Cruz RN

## 2019-12-17 NOTE — PROGRESS NOTES
Coordination of Care  1. Have you been to the ER, urgent care clinic since your last visit? Hospitalized since your last visit? No    2. Have you seen or consulted any other health care providers outside of the [address] [place] [address] since your last visit? Include any pap smears or colon screening. No    Does the patient need refills? YES    Learning Assessment Complete?  yes  Depression Screening complete in the past 12 months? yes     Results for orders placed or performed in visit on 12/17/19   AMB POC GLUCOSE BLOOD, BY GLUCOSE MONITORING DEVICE   Result Value Ref Range    Glucose POC  mg/dL

## 2020-03-27 ENCOUNTER — LAB ONLY (OUTPATIENT)
Dept: FAMILY MEDICINE CLINIC | Age: 40
End: 2020-03-27

## 2020-03-27 ENCOUNTER — HOSPITAL ENCOUNTER (OUTPATIENT)
Dept: LAB | Age: 40
Discharge: HOME | End: 2020-03-27

## 2020-03-27 DIAGNOSIS — E11.65 TYPE 2 DIABETES MELLITUS WITH HYPERGLYCEMIA, WITHOUT LONG-TERM CURRENT USE OF INSULIN (HCC): Primary | ICD-10-CM

## 2020-03-27 DIAGNOSIS — E11.65 TYPE 2 DIABETES MELLITUS WITH HYPERGLYCEMIA, WITHOUT LONG-TERM CURRENT USE OF INSULIN (HCC): ICD-10-CM

## 2020-03-27 LAB
EST. AVERAGE GLUCOSE BLD GHB EST-MCNC: 194 MG/DL
HBA1C MFR BLD: 8.4 % (ref 4–5.6)

## 2020-03-27 PROCEDURE — 83036 HEMOGLOBIN GLYCOSYLATED A1C: CPT

## 2020-04-21 ENCOUNTER — PATIENT OUTREACH (OUTPATIENT)
Dept: FAMILY MEDICINE CLINIC | Age: 40
End: 2020-04-21

## 2020-04-21 ENCOUNTER — OFFICE VISIT (OUTPATIENT)
Dept: FAMILY MEDICINE CLINIC | Age: 40
End: 2020-04-21

## 2020-04-21 VITALS — WEIGHT: 191 LBS | BODY MASS INDEX: 38.4 KG/M2

## 2020-04-21 DIAGNOSIS — Z59.41 FOOD INSECURITY: Primary | ICD-10-CM

## 2020-04-21 DIAGNOSIS — E11.65 TYPE 2 DIABETES MELLITUS WITH HYPERGLYCEMIA, WITHOUT LONG-TERM CURRENT USE OF INSULIN (HCC): ICD-10-CM

## 2020-04-21 RX ORDER — METFORMIN HYDROCHLORIDE 500 MG/1
2000 TABLET, EXTENDED RELEASE ORAL
Qty: 360 TAB | Refills: 1 | Status: SHIPPED | OUTPATIENT
Start: 2020-04-21 | End: 2020-05-19 | Stop reason: SDUPTHER

## 2020-04-21 SDOH — ECONOMIC STABILITY - FOOD INSECURITY: FOOD INSECURITY: Z59.41

## 2020-04-21 NOTE — PROGRESS NOTES
Coordination of Care  1. Have you been to the ER, urgent care clinic since your last visit? Hospitalized since your last visit? No    2. Have you seen or consulted any other health care providers outside of the [address] [place] [address] since your last visit? Include any pap smears or colon screening. No    Does the patient need refills? YES    Learning Assessment Complete?  yes  Depression Screening complete in the past 12 months? yes

## 2020-04-21 NOTE — PROGRESS NOTES
4/21/20      [facility]   Chronic Disease Management Nurse Navigator Note    Diagnosis: Diabetes Mellitus, Thyroid    Adherence - PCP: NO  Adherence - Medications: not compliant with most or all meds  Barriers: None    ED visit with reachout: NO  If ED visit with reachout, plan: na  If ED visit without reachout, details: na      DM  Neuropathic pain: no  Vision disturbance: no  Polyuria:no  Polydipsia: no  24 hour dietary recall:  TBD on next NN call. Time in discussion: 45 minutes        Goals      Patient verbalizes understanding of self -management goals of living with Diabetes. 4/21/20  Nichole Bobby will satrt checking fasting BG and reporting BG greater than 200 and less than 80 to NN, taking all medications as ordered, Ana Mccracken will be npotofied regarding refills and Deepthi regarding FU appt for labs and appt with Debora Nj. Nichole Bobby is not aking syntroid and alst lab was June last year. FU in 2 weeks.  im

## 2020-04-21 NOTE — PROGRESS NOTES
Assessment/Plan:    Diagnoses and all orders for this visit:    1. Food insecurity    2. Type 2 diabetes mellitus with hyperglycemia, without long-term current use of insulin (HCC)  -     metFORMIN ER (GLUCOPHAGE XR) 500 mg tablet; Take 4 Tabs by mouth daily (with dinner). [place] 4 pastillas con hong    Get resources to her for food pantry today  F/up with her diabetic provider in 3 months, message sent to [place] Viri in case she wants to add anything to the order     Follow-up and Dispositions    · Return in about 3 months (around 2020). ROGERS Griffin expressed understanding of this plan. An AVS was printed and given to the patient.      ----------------------------------------------------------------------    Chief Complaint   Patient presents with    Follow-up     Diabetes       History of Present Illness:  Telephone call to the pt, virtual visit, she had declined video visit but agreed to phone  She was identified by name and   She is being seen today for \"refill of metfomin\" and DM f/up  She does occasionally check her bs at home and it is never over 200. She did not have the numbers written down  She is taking her meds as rx'd, including her metformin and insulin qhs  She denies the following: polyuria/polydipsia, unexplained weight loss, vaginal itching or blurry vision  She is very low on food in her house, she has been afraid to seek food at the pantry's available. .. Past Medical History:   Diagnosis Date    Diabetes ([facility] [address]     GDM (gestational diabetes mellitus)     IUD (intrauterine device) since         Current Outpatient Medications   Medication Sig Dispense Refill    metFORMIN ER (GLUCOPHAGE XR) 500 mg tablet Take 4 Tabs by mouth daily (with dinner). [place] 4 pastillas con hong 360 Tab 1    insulin syringe-needle U-100 1/2 mL 31 gauge x 15/64\" syrg 1 Syringe by Does Not Apply route daily.  90 Pen Needle 3    insulin NPH (NOVOLIN N, HUMULIN N) 100 unit/mL injection Sig: use 22 units qhs; max daily dose 50 units for TPC 1 Vial 2    levothyroxine (SYNTHROID) 88 mcg tablet Take 1 Tab by mouth Daily (before breakfast). For thyroid. Greek sig 90 Tab 1    loratadine (CLARITIN) 10 mg tablet Take 1 Tab by mouth daily. For allergies; Gina 1 tab diario para alergias 30 Tab 0    Blood-Glucose Meter monitoring kit Sig: check blood sugar bid, please provide relion brand for self pay patient 1 Kit 0    prenatal vit-calcium-iron-fa (PRENATAL PLUS) 29 mg iron- 1 mg tab tablet Take 1 Tab by mouth daily. Please provide $4 formulary 90 Tab 1    Arm Brace (WRIST BRACE MEDIUM) misc Use PRN for wrist pain 1 Each 0       No Known Allergies    Social History     Tobacco Use    Smoking status: Never Smoker    Smokeless tobacco: Never Used   Substance Use Topics    Alcohol use: No     Alcohol/week: 0.0 standard drinks    Drug use: No       No family history on file.     Physical Exam:     Visit Vitals  Wt 191 lb (86.6 kg)   LMP 03/21/2020   BMI 38.40 kg/m²       A&Ox3  WDWN NAD  Respirations normal and non labored

## 2020-04-21 NOTE — PROGRESS NOTES
Sent staff message to OW to call pt and discuss food pantry options near her address.  Jony Stern, RN

## 2020-04-21 NOTE — Clinical Note
Needs refills and lab work for DM and thyroid level, she is not taking her synthroid for a while. Last level was in June last year. She also needs alab appt and a FU in 3 months with jorge. Thank you ladies.

## 2020-04-22 DIAGNOSIS — E11.65 TYPE 2 DIABETES MELLITUS WITH HYPERGLYCEMIA, WITHOUT LONG-TERM CURRENT USE OF INSULIN (HCC): Primary | ICD-10-CM

## 2020-04-22 NOTE — PROGRESS NOTES
On or after 7/14/2020 patient may return for nonfasting labs. Diagnoses and all orders for this visit:    1. Type 2 diabetes mellitus with hyperglycemia, without long-term current use of insulin (HCC)  -     HEMOGLOBIN A1C WITH EAG;  Future      Chandana Irene DNP, FNP-BC, BC-ADM  Board Certified in Advanced Diabetes Management

## 2020-04-23 DIAGNOSIS — E03.9 ACQUIRED HYPOTHYROIDISM: Primary | ICD-10-CM

## 2020-04-23 NOTE — PROGRESS NOTES
Patient will also need nonfasting TSH lab. Last TSH was in 6/25/2019. She is not currently taking any thyroid medication. Diagnoses and all orders for this visit:    1. Acquired hypothyroidism  -     TSH 3RD GENERATION;  Future

## 2020-04-29 ENCOUNTER — OFFICE VISIT (OUTPATIENT)
Dept: FAMILY MEDICINE CLINIC | Age: 40
End: 2020-04-29

## 2020-04-29 DIAGNOSIS — Z71.89 COUNSELING AND COORDINATION OF CARE: Primary | ICD-10-CM

## 2020-04-29 NOTE — PROGRESS NOTES
(Telephone encounter due to closed clinics because of COVID19) OW called pt to assist her with food resources. OW informed the pt about free meals distribution in local schools and provided information on 3 different food resources. Pt verbalized understanding.

## 2020-05-12 ENCOUNTER — LAB ONLY (OUTPATIENT)
Dept: FAMILY MEDICINE CLINIC | Age: 40
End: 2020-05-12

## 2020-05-12 ENCOUNTER — HOSPITAL ENCOUNTER (OUTPATIENT)
Dept: LAB | Age: 40
Discharge: HOME | End: 2020-05-12

## 2020-05-12 DIAGNOSIS — E03.9 ACQUIRED HYPOTHYROIDISM: ICD-10-CM

## 2020-05-12 DIAGNOSIS — E11.65 TYPE 2 DIABETES MELLITUS WITH HYPERGLYCEMIA, WITHOUT LONG-TERM CURRENT USE OF INSULIN (HCC): ICD-10-CM

## 2020-05-12 PROCEDURE — 83036 HEMOGLOBIN GLYCOSYLATED A1C: CPT

## 2020-05-12 PROCEDURE — 84443 ASSAY THYROID STIM HORMONE: CPT

## 2020-05-12 NOTE — PROGRESS NOTES
Patient came to the lab per Niya Higginbotham. TSH and A1C were drawn from [facility] without complications. Results pending.

## 2020-05-13 LAB
EST. AVERAGE GLUCOSE BLD GHB EST-MCNC: 189 MG/DL
HBA1C MFR BLD: 8.2 % (ref 4–5.6)
TSH SERPL DL<=0.05 MIU/L-ACNC: 2.93 UIU/ML (ref 0.36–3.74)

## 2020-05-13 NOTE — PROGRESS NOTES
A1c 8.2, down from 8.4. TSH 2.93, normal.  The lowest has been 7.9 on 6/25/19.   Has follow up 5/19/20

## 2020-05-19 ENCOUNTER — OFFICE VISIT (OUTPATIENT)
Dept: FAMILY MEDICINE CLINIC | Age: 40
End: 2020-05-19

## 2020-05-19 ENCOUNTER — TELEPHONE (OUTPATIENT)
Dept: FAMILY MEDICINE CLINIC | Age: 40
End: 2020-05-19

## 2020-05-19 DIAGNOSIS — R05.9 COUGH: ICD-10-CM

## 2020-05-19 DIAGNOSIS — N91.2 ABSENT MENSES: ICD-10-CM

## 2020-05-19 DIAGNOSIS — E11.65 TYPE 2 DIABETES MELLITUS WITH HYPERGLYCEMIA, WITHOUT LONG-TERM CURRENT USE OF INSULIN (HCC): Primary | ICD-10-CM

## 2020-05-19 DIAGNOSIS — E03.9 ACQUIRED HYPOTHYROIDISM: ICD-10-CM

## 2020-05-19 LAB — GLUCOSE POC: NORMAL MG/DL

## 2020-05-19 RX ORDER — METFORMIN HYDROCHLORIDE 500 MG/1
2000 TABLET, EXTENDED RELEASE ORAL
Qty: 360 TAB | Refills: 1 | Status: SHIPPED | OUTPATIENT
Start: 2020-05-19 | End: 2021-01-14 | Stop reason: SDUPTHER

## 2020-05-19 RX ORDER — LEVOTHYROXINE SODIUM 88 UG/1
88 TABLET ORAL
Qty: 90 TAB | Refills: 3 | Status: SHIPPED | OUTPATIENT
Start: 2020-05-19 | End: 2021-08-12 | Stop reason: ALTCHOICE

## 2020-05-19 RX ORDER — LORATADINE 10 MG/1
10 TABLET ORAL DAILY
Qty: 30 TAB | Refills: 5 | Status: SHIPPED | OUTPATIENT
Start: 2020-05-19 | End: 2021-04-22

## 2020-05-19 NOTE — PROGRESS NOTES
Sophia Lowery is a 35 y.o. female evaluated via telephone at [phone] on 5/19/2020. Patient identification verified with 2 identifiers. Consent: She and/or health care decision maker has provided verbal consent to proceed: Yes Pursuant to the emergency declaration under the [facility], [address] [place] [address] authority and the [place] and [employer] Act, this Virtual Telephone Visit was conducted to reduce the patient's risk of exposure to COVID-19. : Dominick Armstrong  Chief Complaint   Patient presents with    Diabetes     f/u   Needs refills; Also mentions she has the Essure device IUD which I note was taken off the [employer] in 2018. She was contacted about this but did not reach out at that time. This is the first time she has missed a period in many years, says it feels like she is going to have the period but isn't. Results for orders placed or performed in visit on 05/19/20   AMB POC GLUCOSE BLOOD, BY GLUCOSE MONITORING DEVICE   Result Value Ref Range    Glucose  f mg/dL     Lab Results   Component Value Date/Time    Hemoglobin A1c 8.2 (H) 05/12/2020 12:20 PM    Hemoglobin A1c 8.4 (H) 03/27/2020 09:34 AM    Hemoglobin A1c 8.0 (H) 09/30/2019 10:46 PM     Irregular Menses   The history is provided by the patient. This is a new problem. The current episode started more than 1 week ago. Associated symptoms include abdominal pain. Cold sweats \"on and off\" over the last 3 weeks. Daytime or nighttime. Associated symptoms include absent menses. Review of Systems   Constitutional: Positive for chills. Negative for fever. Gastrointestinal: Positive for abdominal pain. Negative for nausea and vomiting. Genitourinary: Negative for dysuria, frequency and urgency.         No vaginal discharge     Vitals:    05/19/20 1404   Temp: 97.7 °F (36.5 °C)   Weight: 190 lb (86.2 kg)   Patient's last menstrual period was 04/02/2020 (exact date). Has the Essure IUD. Documentation:  I communicated with the patient and/or health care decision maker about:  A1c 8.2, down from 8.4. The lowest has been 7.9 on 6/25/19. TSH 2.93, normal.   Diagnoses and all orders for this visit:    1. Type 2 diabetes mellitus with hyperglycemia, without long-term current use of insulin (HCC)  -     AMB POC GLUCOSE BLOOD, BY GLUCOSE MONITORING DEVICE  -     insulin NPH (NOVOLIN N, HUMULIN N) 100 unit/mL injection; Sig: use 24 units qhs; max daily dose 50 units for TPC  -     metFORMIN ER (GLUCOPHAGE XR) 500 mg tablet; Take 4 Tabs by mouth daily (with dinner). [place] 4 pastillas con hong    2. Acquired hypothyroidism  -     levothyroxine (SYNTHROID) 88 mcg tablet; Take 1 Tab by mouth Daily (before breakfast). For thyroid. Urdu sig    3. Cough  -     loratadine (CLARITIN) 10 mg tablet; Take 1 Tab by mouth daily. For allergies; Gina 1 tab diario para alergias    4. Absent menses      Details of this discussion including any medical advice provided: Increase NPH to 24 units sc qhs given fasting glucose 200 and A1c 8.2. Purchase pregnancy test and contact us with results. If not pregnant, will need follow up regarding abdominal pain. Total Time: minutes: 21-30 minutes  I affirm this is a Patient Initiated Episode with a Patient who has not had a related appointment within my department in the past 7 days or scheduled within the next 24 hours.   Francisca Velasco NP

## 2020-05-19 NOTE — PROGRESS NOTES
Results for orders placed or performed in visit on 05/19/20   AMB POC GLUCOSE BLOOD, BY GLUCOSE MONITORING DEVICE   Result Value Ref Range    Glucose  f mg/dL     Patient report

## 2020-05-19 NOTE — PROGRESS NOTES
Coordination of Care  1. Have you been to the ER, urgent care clinic since your last visit? Hospitalized since your last visit? No    2. Have you seen or consulted any other health care providers outside of the [address] [place] [address] since your last visit? Include any pap smears or colon screening. No    Does the patient need refills? YES    Learning Assessment Complete?  yes

## 2020-05-20 VITALS — BODY MASS INDEX: 38.2 KG/M2 | TEMPERATURE: 97.7 F | WEIGHT: 190 LBS

## 2020-06-11 ENCOUNTER — TELEPHONE (OUTPATIENT)
Dept: FAMILY MEDICINE CLINIC | Age: 40
End: 2020-06-11

## 2020-06-11 NOTE — TELEPHONE ENCOUNTER
Fabrizio Jones  Call main 6/11/2020  Call main office stated that she is not pregnat , patient want to know if  She can talk to provider .     Thank you   Bernadine Moss

## 2020-06-11 NOTE — TELEPHONE ENCOUNTER
Per chart review, the patient had a recent appointment with Jose Guzmán, and was asked to take a home pregnancy test and call the [facility] office with the results. Routing patient's call to Girard for review.  Bharti Johnson RN

## 2020-06-12 NOTE — TELEPHONE ENCOUNTER
Per Felicia, After reviewing [facility]'s note from 06-19-18, it seems the Essure is a permanent IUD and the referral to GYN through [facility] was cancelled. Patient then had an [facility] visit with Lucretia Ellsworth on 12-16-19 and all was well. Will need a workup at this time to determine source of abdominal pain, could start with imaging.

## 2020-06-12 NOTE — TELEPHONE ENCOUNTER
I note patient was referred to GYN in 2018 for removal of IUD due to threads lost.  I will reach out to Felicia Norton regarding any parameters and further information about doing another referral to GYN for this. Message sent to registrars to schedule patient on 6/15/20 to see LK to discuss.

## 2020-06-15 ENCOUNTER — OFFICE VISIT (OUTPATIENT)
Dept: FAMILY MEDICINE CLINIC | Age: 40
End: 2020-06-15

## 2020-06-15 DIAGNOSIS — R10.30 LOWER ABDOMINAL PAIN: Primary | ICD-10-CM

## 2020-06-15 NOTE — PROGRESS NOTES
Coordination of Care  1. Have you been to the ER, urgent care clinic since your last visit? Hospitalized since your last visit? No    2. Have you seen or consulted any other health care providers outside of the [address] [place] [address] [place] since your last visit? Include any pap smears or colon screening. No    Does the patient need refills? NO    Learning Assessment Complete? yes  Depression Screening complete in the past 12 months? yes       3:50 PM  Check-out Note: Please order transvaginal ultrasound and non-ob abd ultrasound (I may not have entered orders correctly) and schedule for next week.  I am hopeful that this will provide the information we need to do another [facility] to GYN. Pelvic and transvaginal Ultrasound were scheduled for patient as ordered by provider; date, time and place of test was approved/agreed by patient. Patient aware to arrive 30 minutes prior to outpatient registration. Take a picture ID, drink 32-46oz of clear liquids starting 1 hour prior the test, try not to void, and no cardonated drinks or milk the day of the test.Care card/financial assistance process explained to patient. Discussed that if she is experiencing any s/s or COVID-19 before the [place] appt. To call the central scheduling number to reschedule appt. Patient requested resources with assistance in applying for medicare discussed that she can call either the [address] [place] [address] office to schedule appt. With GABRIEL BANKS or to call Maulik Garcia with the [facility] phone numbers provided. This has been fully explained to the patient, who indicates understanding and agrees with plan. No further questions at this time.         total time spent assisting patient 17 minutes

## 2020-06-15 NOTE — PROGRESS NOTES
Henrik Tejeda is a 38 y.o. female evaluated via telephone at [phone] on 6/15/2020. Patient identification verified with 2 identifiers. Voice mail x 1. Consent: She and/or health care decision maker has provided verbal consent to proceed: Yes Pursuant to the emergency declaration under the [facility] and the [employer] and [employer] Act, this Virtual Telephone Visit was conducted to reduce the patient's risk of exposure to COVID-19. : Monique Sage  Chief Complaint   Patient presents with    Follow-up     abdominal pain    Sweats     during the day on and off, hot flashes     HPI we need to establish that the abdominal pain is due to the IUD. Probably need ultrasounds for this. No n/v. Not related to food. No change in defecation. All of a sudden she will feel the pain in the abdomen and in the back. It is beneath the belly button. It is on both sides. It does a whole [place] around - front to the back. LMP was April 2. It is happening every day. It hurts more since she has not had her period. Last had a negative pregnancy test 2 weeks ago. Documentation:  I communicated with the patient and/or health care decision maker about:  Diagnoses and all orders for this visit:    1. Lower abdominal pain  -     US TRANSVAGINAL; Future  -     US PELV NON OBS; Future        Details of this discussion including any medical advice provided: if we can establish the cause of the abdominal pain, that will direct us toward the appropriate referral.  Total Time: minutes: 21-30 minutes  I affirm this is a Patient Initiated Episode with a Patient who has not had a related appointment within my department in the past 7 days or scheduled within the next 24 hours. Rm Rico NP   Henrik Tejeda expressed understanding and agreed to this plan.

## 2020-06-23 ENCOUNTER — HOSPITAL ENCOUNTER (OUTPATIENT)
Dept: ULTRASOUND IMAGING | Age: 40
Discharge: HOME | End: 2020-06-23
Attending: NURSE PRACTITIONER
Payer: SUBSIDIZED

## 2020-06-23 DIAGNOSIS — R10.30 LOWER ABDOMINAL PAIN: ICD-10-CM

## 2020-06-23 PROCEDURE — 76830 TRANSVAGINAL US NON-OB: CPT

## 2020-06-23 PROCEDURE — 76856 US EXAM PELVIC COMPLETE: CPT

## 2020-06-24 ENCOUNTER — TELEPHONE (OUTPATIENT)
Dept: FAMILY MEDICINE CLINIC | Age: 40
End: 2020-06-24

## 2020-06-24 ENCOUNTER — OFFICE VISIT (OUTPATIENT)
Dept: FAMILY MEDICINE CLINIC | Age: 40
End: 2020-06-24

## 2020-06-24 DIAGNOSIS — R10.2 PELVIC PAIN: Primary | ICD-10-CM

## 2020-06-24 RX ORDER — IBUPROFEN 600 MG/1
600 TABLET ORAL
Qty: 60 TAB | Refills: 0 | Status: SHIPPED | OUTPATIENT
Start: 2020-06-24 | End: 2021-05-24

## 2020-06-24 NOTE — PROGRESS NOTES
Assessment/Plan:    Diagnoses and all orders for this visit:    1. Pelvic pain  -     REFERRAL TO GYNECOLOGY  -     ibuprofen (MOTRIN) 600 mg tablet; Take 1 Tab by mouth every six (6) hours as needed for Pain. Follow-up and Dispositions    · Return in about 3 months (around 2020), or if symptoms worsen or fail to improve. Marko Goldmann McFerren, PA-C Raynette Muta expressed understanding of this plan. An AVS was printed and given to the patient.      ----------------------------------------------------------------------    Chief Complaint   Patient presents with    Results     pelvic and transvaginal ultrasound- this was done due to lower abdominal pain        History of Present Illness:  Pt called and consented to virtual telephone visit, she declined video  She was identified by name and   She is being seen today for f/up on lower abdominal/pelvic pain, she was seen by MAYRA Baires last week and pelvic ultrasounds were ordered  I have reviewed the imaging results before the visit today  The pt tells me that she has had this pain for about 6 weeks that comes and goes. It lasts a few minutes when it is present, and the last time she felt it was about 20 minutes ago and lasted 5 minutes. She had permanent sterilization done and has had a neg pregnancy test, her last period was 20. No evidence of pregnancy on her ultrasound yesterday    She states that the pain is like \"labor pains, contractions\". She has not had any diarrhea or change in bowel movements.   Her last gyn exam was in  and this problem was not present then  Her pap was normal in  and exam at that time did not reveal any concerning findings      Past Medical History:   Diagnosis Date    Diabetes ([facility] [address]     GDM (gestational diabetes mellitus)     IUD (intrauterine device) since      Essure       Current Outpatient Medications   Medication Sig Dispense Refill    ibuprofen (MOTRIN) 600 mg tablet Take 1 Tab by mouth every six (6) hours as needed for Pain. 60 Tab 0    insulin NPH (NOVOLIN N, HUMULIN N) 100 unit/mL injection Sig: use 24 units qhs; max daily dose 50 units for TPC 1 Vial 11    metFORMIN ER (GLUCOPHAGE XR) 500 mg tablet Take 4 Tabs by mouth daily (with dinner). Louis[place] 4 pastillas con hong 360 Tab 1    levothyroxine (SYNTHROID) 88 mcg tablet Take 1 Tab by mouth Daily (before breakfast). For thyroid. Greenlandic sig 90 Tab 3    loratadine (CLARITIN) 10 mg tablet Take 1 Tab by mouth daily. For allergies; Gina 1 tab diario para alergias 30 Tab 5    insulin syringe-needle U-100 1/2 mL 31 gauge x 15/64\" syrg 1 Syringe by Does Not Apply route daily. 90 Pen Needle 3    Blood-Glucose Meter monitoring kit Sig: check blood sugar bid, please provide relion brand for self pay patient 1 Kit 0    prenatal vit-calcium-iron-fa (PRENATAL PLUS) 29 mg iron- 1 mg tab tablet Take 1 Tab by mouth daily. Please provide $4 formulary 90 Tab 1    Arm Brace (WRIST BRACE MEDIUM) misc Use PRN for wrist pain 1 Each 0       No Known Allergies    Social History     Tobacco Use    Smoking status: Never Smoker    Smokeless tobacco: Never Used   Substance Use Topics    Alcohol use: No     Alcohol/week: 0.0 standard drinks    Drug use: No       No family history on file.     Physical Exam:     Visit Vitals  [facility] 04/02/2020 (Exact Date)       A&Ox3  WDWN NAD  Respirations normal and non labored

## 2020-06-24 NOTE — PROGRESS NOTES
Coordination of Care  1. Have you been to the ER, urgent care clinic since your last visit? Hospitalized since your last visit? No    2. Have you seen or consulted any other health care providers outside of the [address] [place] [address] since your last visit? Include any pap smears or colon screening. No    Does the patient need refills? NO    Learning Assessment Complete? yes  Depression Screening complete in the past 12 months? yes     3:41 PM Discharge instructions:    Discharge nurse encounter completed via telephoned call. Name and  verified. AVS, prescription and pharmacy location reviewed . Access Now program explained, patient aware that she has to complete financial screening with [employer], to let us know in case she does not hear about it in two weeks. Patient to follow up about her diabetes with provider Ne Schmitt NP- message has been sent to this provider about when f/u appt. Is needed for her chronic condition. This has been fully explained to the patient, who indicates understanding and agrees with plan. No further questions at this time.  Aaron Huber RN

## 2020-06-24 NOTE — TELEPHONE ENCOUNTER
Patient was seen for her Diabetes follow up back in 05/19/2020, at this time she does not have a follow up appt. Tickler or appt. Scheduled to follow up about the above mention chronic condition. Will you like for patient to have an appt. With you in about 3 months? Thanks, please let registration know.

## 2020-06-24 NOTE — TELEPHONE ENCOUNTER
I provided 6 months of medication. If fasting glucoses are > 200 still, follow up in 3 months. Otherwise, follow up 6 months with me and refer to [facility].

## 2020-06-29 ENCOUNTER — OFFICE VISIT (OUTPATIENT)
Dept: FAMILY MEDICINE CLINIC | Age: 40
End: 2020-06-29

## 2020-06-29 DIAGNOSIS — Z71.89 COUNSELING AND COORDINATION OF CARE: Primary | ICD-10-CM

## 2020-06-29 NOTE — PROGRESS NOTES
Chaitanyaa Flack Forms for AN financial screening have been mailed to patient. Patient understands she needs to sign forms and mail back along with POI.

## 2020-07-01 NOTE — TELEPHONE ENCOUNTER
Nurse telephoned and spoke with patient. Message by provider Jael Rizzo NP reviewed with patient. Tickler was placed in [place] for 6 months f/u with REFUGIO NP, patient aware to call us for same day appt. If blood sugars are greater than 200. This has been fully explained to the patient, who indicates understanding and agrees with plan. No further questions at this time.  Richar Pruitt, RN

## 2020-07-14 ENCOUNTER — OFFICE VISIT (OUTPATIENT)
Dept: FAMILY MEDICINE CLINIC | Age: 40
End: 2020-07-14

## 2020-07-14 DIAGNOSIS — Z71.89 COUNSELING AND COORDINATION OF CARE: Primary | ICD-10-CM

## 2020-07-14 NOTE — PROGRESS NOTES
V. V. Completed AN financial screening forms and POI were received from patient. Completed financial screening has been submitted to Felicia via e-mail. Patient was instructed to call on or after 7/28/2020.

## 2020-08-12 ENCOUNTER — PATIENT OUTREACH (OUTPATIENT)
Dept: FAMILY MEDICINE CLINIC | Age: 40
End: 2020-08-12

## 2020-08-12 NOTE — PROGRESS NOTES
8/12/20    [address] [facility]   Chronic Disease Management Nurse Navigator Note    Diagnosis: Diabetes Mellitus    Adherence - PCP: YES  Adherence - Medications: compliant with all meds  Barriers: None    ED visit with reachout: NO  If ED visit with reachout, plan: na  If ED visit without reachout, details: na      DM  Neuropathic pain: no  Vision disturbance: no  Polyuria:no  Polydipsia: no  24 hour dietary recall:  Declined. Goals      Patient verbalizes understanding of self -management goals of living with Diabetes. 8/12/20  Darel Rubinstein will continue to take medications as ordered, monitor Bg at home, report BG greater than 220 and less than 80 to NN. BG results in the last 3 days fasting were [id number] FU in 1 month. Im      4/21/20  Sharif Rubinstein will satrt checking fasting BG and reporting BG greater than 200 and less than 80 to NN, taking all medications as ordered, Davidson Ignacio will be npotofied regarding refills and Deepthi regarding FU appt for labs and appt with Rosenda Joshua. Sharif Rubinstein is not aking syntroid and alst lab was June last year. FU in 2 weeks.  im              Time in discussion: 30 minutes

## 2020-08-31 ENCOUNTER — OFFICE VISIT (OUTPATIENT)
Dept: OBGYN CLINIC | Age: 40
End: 2020-08-31
Payer: SUBSIDIZED

## 2020-08-31 ENCOUNTER — HOSPITAL ENCOUNTER (OUTPATIENT)
Dept: LAB | Age: 40
Discharge: HOME | End: 2020-08-31
Payer: SUBSIDIZED

## 2020-08-31 VITALS — SYSTOLIC BLOOD PRESSURE: 118 MMHG | BODY MASS INDEX: 38.4 KG/M2 | WEIGHT: 191 LBS | DIASTOLIC BLOOD PRESSURE: 78 MMHG

## 2020-08-31 DIAGNOSIS — N93.9 ABNORMAL UTERINE BLEEDING (AUB): Primary | ICD-10-CM

## 2020-08-31 DIAGNOSIS — N92.6 MISSED MENSES: ICD-10-CM

## 2020-08-31 DIAGNOSIS — Z11.3 SCREENING FOR VENEREAL DISEASE: ICD-10-CM

## 2020-08-31 LAB
HCG URINE, QL. (POC): NEGATIVE
VALID INTERNAL CONTROL?: YES

## 2020-08-31 PROCEDURE — 81025 URINE PREGNANCY TEST: CPT | Performed by: OBSTETRICS & GYNECOLOGY

## 2020-08-31 PROCEDURE — 87591 N.GONORRHOEAE DNA AMP PROB: CPT

## 2020-08-31 PROCEDURE — 99203 OFFICE O/P NEW LOW 30 MIN: CPT | Performed by: OBSTETRICS & GYNECOLOGY

## 2020-08-31 NOTE — PROGRESS NOTES
Problem Visit    Nicolás Marie is a 38 y.o. G0 presenting for problem visit. Pt with previously regular monthly menses, but amenorrhea for the past 5 months (since 2020), until began spotting this past week. UPT neg in office today, pt is s/p ESSURE. Had U/S , normal. Pt with PMH of thyroid dz, but most recent TSH wnl. Reports weight has been stable, but has noted increased abdominal bloating. Denies VMS, vaginal dryness, or other signs of menopause. Pt is sexually active only with , accepts STI testing today. Pt has 3 children (ages 6-17, 2 girls, 1 boy). Kyrgyz speaking only, M87  used for entire visit. Ob/Gyn Hx:   A0   LMP-2020, then 19  Menses- previously regular, monthly, most recently with amenorrhea x 5 months  Contraception-ESSURE  STI- yes  ? SA-yes    Health maintenance:  [id number] normal   Mammo-    Past Medical History:   Diagnosis Date    Diabetes ([facility] [address]     GDM (gestational diabetes mellitus)     IUD (intrauterine device) since      Essure       Past Surgical History:   Procedure Laterality Date    HX APPENDECTOMY         History reviewed. No pertinent family history.     Social History     Socioeconomic History    Marital status:      Spouse name: Not on file    Number of children: Not on file    Years of education: Not on file    Highest education level: Not on file   Occupational History    Not on file   Social Needs    Financial resource strain: Not on file    Food insecurity     Worry: Not on file     Inability: Not on file    Transportation needs     Medical: Not on file     Non-medical: Not on file   Tobacco Use    Smoking status: Never Smoker    Smokeless tobacco: Never Used   Substance and Sexual Activity    Alcohol use: No     Alcohol/week: 0.0 standard drinks    Drug use: No    Sexual activity: Yes     Partners: Male     Birth control/protection: None   Lifestyle    Physical activity     Days per week: Not on file     Minutes per session: Not on file    Stress: Not on file   Relationships    Social connections     Talks on phone: Not on file     Gets together: Not on file     Attends Gnosticist service: Not on file     Active member of club or organization: Not on file     Attends meetings of clubs or organizations: Not on file     Relationship status: Not on file    Intimate partner violence     Fear of current or ex partner: Not on file     Emotionally abused: Not on file     Physically abused: Not on file     Forced sexual activity: Not on file   Other Topics Concern    Not on file   Social History Narrative    She has 2 girls, age 9 and 8. One of her girls has urinary retention along with frequent UTIs, on miralax for probable constipation. Mom expressed interest in seeing Ruy regarding coping with her child's condition. Current Outpatient Medications   Medication Sig Dispense Refill    insulin NPH (NOVOLIN N, HUMULIN N) 100 unit/mL injection Sig: use 24 units qhs; max daily dose 50 units for TPC 1 Vial 11    metFORMIN ER (GLUCOPHAGE XR) 500 mg tablet Take 4 Tabs by mouth daily (with dinner). [place] 4 pastillas con hong 360 Tab 1    levothyroxine (SYNTHROID) 88 mcg tablet Take 1 Tab by mouth Daily (before breakfast). For thyroid. Danish sig 90 Tab 3    insulin syringe-needle U-100 1/2 mL 31 gauge x 15/64\" syrg 1 Syringe by Does Not Apply route daily. 90 Pen Needle 3    ibuprofen (MOTRIN) 600 mg tablet Take 1 Tab by mouth every six (6) hours as needed for Pain. 60 Tab 0    loratadine (CLARITIN) 10 mg tablet Take 1 Tab by mouth daily. For allergies; Gina 1 tab diario para alergias 30 Tab 5    Blood-Glucose Meter monitoring kit Sig: check blood sugar bid, please provide relion brand for self pay patient 1 Kit 0    prenatal vit-calcium-iron-fa (PRENATAL PLUS) 29 mg iron- 1 mg tab tablet Take 1 Tab by mouth daily.  Please provide $4 formulary 90 Tab 1    Arm Brace (WRIST BRACE MEDIUM) misc Use PRN for wrist pain 1 Each 0       No Known Allergies    Review of Systems - History obtained from the patient  Constitutional: negative for weight loss, fever, night sweats  HEENT: negative for hearing loss, earache, congestion, snoring, sorethroat  CV: negative for chest pain, palpitations, edema  Resp: negative for cough, shortness of breath, wheezing  GI: negative for change in bowel habits, abdominal pain, black or bloody stools  : negative for frequency, dysuria, hematuria, vaginal discharge, +amenorrhea  MSK: negative for back pain, joint pain, muscle pain  Breast: negative for breast lumps, nipple discharge, galactorrhea  Skin :negative for itching, rash, hives  Neuro: negative for dizziness, headache, confusion, weakness  Psych: negative for anxiety, depression, change in mood  Heme/lymph: negative for bleeding, bruising, pallor    Physical Exam    Visit Vitals  /78 (BP 1 Location: Left arm, BP Patient Position: Sitting)   Wt 191 lb (86.6 kg)   BMI 38.40 kg/m²       Constitutional  · Appearance: well-nourished, well developed, alert, in no acute distress, +centripital obesity    HENT  · Head and Face: appears normal    Neck  · Inspection/Palpation: normal appearance, no masses or tenderness  · Lymph Nodes: no lymphadenopathy present  · Thyroid: gland size normal, nontender, no nodules or masses present on palpation    Chest  · Respiratory Effort: non-labored breathing  · Auscultation: CTAB, normal breath sounds    Cardiovascular  · Heart:  · Auscultation: regular rate and rhythm without murmur  · Extremities: no peripheral edema    Gastrointestinal  · Abdominal Examination: abdomen non-tender to palpation, normal bowel sounds, no masses present  · Liver and spleen: no hepatomegaly present, spleen not palpable  · Hernias: no hernias identified    Genitourinary  · External Genitalia: normal appearance for age, no discharge present, no tenderness present, no inflammatory lesions present, no masses present, no atrophy present  · Vagina: normal vaginal vault without central or paravaginal defects, no discharge present, no inflammatory lesions present, no masses present  · Bladder: non-tender to palpation  · Urethra: appears normal  · Cervix: normal   · Uterus: normal size, shape and consistency  · Adnexa: no adnexal tenderness present, no adnexal masses present  · Perineum: perineum within normal limits, no evidence of trauma, no rashes or skin lesions present    Skin  · General Inspection: no rash, no lesions identified    Neurologic/Psychiatric  · Mental Status:  · Orientation: grossly oriented to person, place and time  · Mood and Affect: mood normal, affect appropriate    UPT neg    Assessment/Plan:  38 y.o. with AUB, amenorrhea x 5 months, now with spotting, previously regular menses. Unclear etiology, possibly anovulatory cycles d/t obesity.  +hypothyroidism, obesity, DM.     -labs: TSH, PRL, FSH, LH, E2  -menstrual calendar  -STI testing  -encouraged weight loss, diet, exercise  -if menses remain irregular, consider hormonal options to regulate menses    RTC 1 month to follow up results and for Niurka Ruggiero MD  8/31/2020  3:16 PM

## 2020-08-31 NOTE — PATIENT INSTRUCTIONS
Abnormal Uterine Bleeding: Care Instructions  Your Care Instructions     Abnormal uterine bleeding is irregular bleeding from the uterus that is longer or heavier than usual or does not occur at your regular time. Sometimes it is caused by changes in hormone levels. It can also be caused by growths in the uterus, such as fibroids or polyps. Sometimes a cause cannot be found. You may have heavy bleeding when you are not expecting your period. Your doctor may suggest a pregnancy test, if you think you are pregnant. Follow-up care is a key part of your treatment and safety. Be sure to make and go to all appointments, and call your doctor if you are having problems. It's also a good idea to know your test results and keep a list of the medicines you take. How can you care for yourself at home? · Be safe with medicines. Take pain medicines exactly as directed. ? If the doctor gave you a prescription medicine for pain, take it as prescribed. ? If you are not taking a prescription pain medicine, ask your doctor if you can take an over-the-counter medicine. · You may be low in iron because of blood loss. Eat a balanced diet that is high in iron and vitamin C. Foods rich in iron include red meat, shellfish, eggs, beans, and leafy green vegetables. Talk to your doctor about whether you need to take iron pills or a multivitamin. When should you call for help? [id number] anytime you think you may need emergency care. For example, call if:  · You passed out (lost consciousness). Call your doctor now or seek immediate medical care if:  · You have new or worse belly or pelvic pain. · You have severe vaginal bleeding. · You feel dizzy or lightheaded, or you feel like you may faint. Watch closely for changes in your health, and be sure to contact your doctor if:  · You think you may be pregnant. · Your bleeding gets worse. · You do not get better as expected. Where can you learn more?   Go to http://www.gray.com/  Enter [id number] in the search box to learn more about \"Abnormal Uterine Bleeding: Care Instructions. \"  Current as of: November 8, 2019               Content Version: 12.5  © 9102-4413 Healthwise, Incorporated. Care instructions adapted under license by [employer] (which disclaims liability or warranty for this information). If you have questions about a medical condition or this instruction, always ask your healthcare professional. [address] any warranty or liability for your use of this information.

## 2020-09-01 LAB
ESTRADIOL SERPL-MCNC: 119 PG/ML
FSH SERPL-ACNC: 6.8 MIU/ML
LH SERPL-ACNC: 11.2 MIU/ML
PROLACTIN SERPL-MCNC: 7.6 NG/ML (ref 4.8–23.3)
TSH SERPL DL<=0.005 MIU/L-ACNC: 2.03 UIU/ML (ref 0.45–4.5)

## 2020-09-03 LAB
C TRACH RRNA SPEC QL NAA+PROBE: NEGATIVE
N GONORRHOEA RRNA SPEC QL NAA+PROBE: NEGATIVE
T VAGINALIS DNA SPEC QL NAA+PROBE: NEGATIVE

## 2020-09-28 ENCOUNTER — OFFICE VISIT (OUTPATIENT)
Dept: OBGYN CLINIC | Age: 40
End: 2020-09-28
Payer: SUBSIDIZED

## 2020-09-28 VITALS
HEIGHT: 59 IN | BODY MASS INDEX: 38.51 KG/M2 | WEIGHT: 191 LBS | SYSTOLIC BLOOD PRESSURE: 122 MMHG | DIASTOLIC BLOOD PRESSURE: 88 MMHG

## 2020-09-28 DIAGNOSIS — E66.01 SEVERE OBESITY (BMI 35.0-39.9) WITH COMORBIDITY (HCC): ICD-10-CM

## 2020-09-28 DIAGNOSIS — N93.8 DUB (DYSFUNCTIONAL UTERINE BLEEDING): ICD-10-CM

## 2020-09-28 DIAGNOSIS — N91.2 AMENORRHEA: Primary | ICD-10-CM

## 2020-09-28 PROCEDURE — 99213 OFFICE O/P EST LOW 20 MIN: CPT | Performed by: OBSTETRICS & GYNECOLOGY

## 2020-09-28 RX ORDER — MEDROXYPROGESTERONE ACETATE 10 MG/1
TABLET ORAL
Qty: 10 TAB | Refills: 11 | Status: SHIPPED | OUTPATIENT
Start: 2020-09-28 | End: 2021-02-23 | Stop reason: ALTCHOICE

## 2020-09-28 NOTE — PATIENT INSTRUCTIONS
Sangrado uterino anormal: Instrucciones de cuidado  Abnormal Uterine Bleeding: Care Instructions  Instrucciones de cuidado    El sangrado uterino anormal (AUB, por jerilyn siglas en inglés) es un sangrado irregular del útero que dura más o es más intenso de lo normal o que no ocurre en el momento habitual para usted. A veces, se debe a cambios en los niveles hormonales. También puede estar causado por crecimientos en el útero, tales mohan fibromas o pólipos. A veces no se puede encontrar la causa. Podría tener mucho sangrado cuando no está esperando foster período. Foster médico puede sugerirle scott prueba de embarazo si savi que está [place]. La atención de seguimiento es scott parte clave de foster tratamiento y seguridad. Asegúrese de hacer y acudir a todas las citas, y llame a foster médico si está teniendo problemas. También es scott buena idea saber los resultados de jerilyn exámenes y mantener scott lista de los medicamentos que jessica. ¿Cómo puede cuidarse en el hogar? · Sea liza con los medicamentos. [place] los analgésicos (medicamentos para el dolor) exactamente mohan le fueron indicados. ? Si el médico le recetó un analgésico, tómelo según las indicaciones. ? Si no está tomando un analgésico recetado, pregúntele a foster médico si puede suhail jonelle de [employer]. · Podría faltarle nilesh por la pérdida de mae. Coma scott dieta equilibrada con alto contenido de nilesh y vitamina C. Entre los alimentos ricos en nilesh se encuentran las yasir moody, los [place], los [place], los frijoles (habichuelas) y las verduras de hojas verdes. Pregúntele a foster médico si necesita suhail pastillas de nliesh o un multivitamínico.  ¿Cuándo debe pedir ayuda? Llame al 911 en cualquier momento que considere que necesita atención de [place]. Por ejemplo, llame si:    · Se desmayó (perdió el conocimiento).    Llame a foster médico ahora mismo o busque atención médica inmediata si:    · Tiene dolor repentino e intenso en el abdomen o la pelvis.     · Tiene sangrado vaginal intenso. Empapa jerilyn toallas sanitarias o tampones habituales cada hora fatemeh 2 o más horas.     · Siente mareos o aturdimiento, o que está a punto de desmayarse. Preste especial atención a los cambios en foster frederic y asegúrese de comunicarse con foster médico si:    · Tiene un dolor nuevo en el abdomen o la pelvis.     · Tiene fiebre.     · El sangrado empeora o dura más de 1 semana.     · Piensa que podría estar embarazada. ¿Dónde puede encontrar más información en inglés? Vaya a http://damion-david.info/  Ceci Pollen [id number] en la búsqueda para aprender más acerca de \"Sangrado uterino anormal: Instrucciones de cuidado. \"  Revisado: 8 noviembre, 3154               [facility] del contenido: 12.6  © 4778-1394 Healthwise, Incorporated. Las instrucciones de cuidado fueron adaptadas bajo licencia por [employer] (which disclaims liability or warranty for this information). Si usted tiene [place] afección médica o sobre estas instrucciones, siempre pregunte a foster profesional de frederic. Healthwise, Incorporated niega toda garantía o responsabilidad por foster uso de esta información.

## 2020-09-28 NOTE — PROGRESS NOTES
Problem Visit    Jesus Adames is a 38 y.o. G0 presenting for follow up of amenorrhea for the past 5 months (since 2020). Began spotting 1 week prior to last office visit. Pt is s/p ESSURE. Had U/S , normal. Pt with PMH of thyroid dz, but most recent TSH wnl. Reports weight has been stable, but has noted increased abdominal bloating. Denies VMS, vaginal dryness, or other signs of menopause. Pt is sexually active only with , STI testing last office visit negative. TSH, PRL, FSH, LH, E2 wnl at piror visit. No clinical signs of hyperandrogenism. Pt has 3 children (ages 6-17, 2 girls, 1 boy). Croatian speaking only,  used for entire visit. Ob/Gyn Hx:   A0   LMP-2020, then 20  Menses- previously regular, monthly, most recently with amenorrhea x 5 months  Contraception-ESSURE  STI- yes  ? SA-yes    Health maintenance:  [id number] normal   Mammo-    Past Medical History:   Diagnosis Date    Diabetes ([address]     GDM (gestational diabetes mellitus)     IUD (intrauterine device) since      Essure       Past Surgical History:   Procedure Laterality Date    HX APPENDECTOMY         History reviewed. No pertinent family history.     Social History     Socioeconomic History    Marital status:      Spouse name: Not on file    Number of children: Not on file    Years of education: Not on file    Highest education level: Not on file   Occupational History    Not on file   Social Needs    Financial resource strain: Not on file    Food insecurity     Worry: Not on file     Inability: Not on file    Transportation needs     Medical: Not on file     Non-medical: Not on file   Tobacco Use    Smoking status: Never Smoker    Smokeless tobacco: Never Used   Substance and Sexual Activity    Alcohol use: No     Alcohol/week: 0.0 standard drinks    Drug use: No    Sexual activity: Yes     Partners: Male     Birth control/protection: None   Lifestyle    Physical activity     Days per week: Not on file     Minutes per session: Not on file    Stress: Not on file   Relationships    Social connections     Talks on phone: Not on file     Gets together: Not on file     Attends Sikhism service: Not on file     Active member of club or organization: Not on file     Attends meetings of clubs or organizations: Not on file     Relationship status: Not on file    Intimate partner violence     Fear of current or ex partner: Not on file     Emotionally abused: Not on file     Physically abused: Not on file     Forced sexual activity: Not on file   Other Topics Concern    Not on file   Social History Narrative    She has 2 girls, age 9 and 8. One of her girls has urinary retention along with frequent UTIs, on miralax for probable constipation. Mom expressed interest in seeing Katlyn Chand regarding coping with her child's condition. Current Outpatient Medications   Medication Sig Dispense Refill    insulin NPH (NOVOLIN N, HUMULIN N) 100 unit/mL injection Sig: use 24 units qhs; max daily dose 50 units for TPC 1 Vial 11    metFORMIN ER (GLUCOPHAGE XR) 500 mg tablet Take 4 Tabs by mouth daily (with dinner). [place] 4 pastillas con hong 360 Tab 1    levothyroxine (SYNTHROID) 88 mcg tablet Take 1 Tab by mouth Daily (before breakfast). For thyroid. Croatian sig 90 Tab 3    insulin syringe-needle U-100 1/2 mL 31 gauge x 15/64\" syrg 1 Syringe by Does Not Apply route daily. 90 Pen Needle 3    ibuprofen (MOTRIN) 600 mg tablet Take 1 Tab by mouth every six (6) hours as needed for Pain. 60 Tab 0    loratadine (CLARITIN) 10 mg tablet Take 1 Tab by mouth daily. For allergies; Gian 1 tab diario para alergias 30 Tab 5    Blood-Glucose Meter monitoring kit Sig: check blood sugar bid, please provide relion brand for self pay patient 1 Kit 0    prenatal vit-calcium-iron-fa (PRENATAL PLUS) 29 mg iron- 1 mg tab tablet Take 1 Tab by mouth daily.  Please provide $4 formulary 90 Tab 1    Arm Brace (WRIST BRACE MEDIUM) misc Use PRN for wrist pain 1 Each 0       No Known Allergies    Review of Systems - History obtained from the patient  Constitutional: negative for weight loss, fever, night sweats  HEENT: negative for hearing loss, earache, congestion, snoring, sorethroat  CV: negative for chest pain, palpitations, edema  Resp: negative for cough, shortness of breath, wheezing  GI: negative for change in bowel habits, abdominal pain, black or bloody stools  : negative for frequency, dysuria, hematuria, vaginal discharge, +amenorrhea  MSK: negative for back pain, joint pain, muscle pain  Breast: negative for breast lumps, nipple discharge, galactorrhea  Skin :negative for itching, rash, hives  Neuro: negative for dizziness, headache, confusion, weakness  Psych: negative for anxiety, depression, change in mood  Heme/lymph: negative for bleeding, bruising, pallor    Physical Exam    Visit Vitals  /78 (BP 1 Location: Left arm, BP Patient Position: Sitting)   Wt 191 lb (86.6 kg)   BMI 38.40 kg/m²       Constitutional  · Appearance: well-nourished, well developed, alert, in no acute distress, +centripital obesity    HENT  · Head and Face: appears normal    Neck  · Inspection/Palpation: normal appearance, no masses or tenderness  · Lymph Nodes: no lymphadenopathy present  · Thyroid: gland size normal, nontender, no nodules or masses present on palpation    Chest  · Respiratory Effort: non-labored breathing  · Auscultation: CTAB, normal breath sounds    Cardiovascular  · Heart:  · Auscultation: regular rate and rhythm without murmur  · Extremities: no peripheral edema    Gastrointestinal  · Abdominal Examination: abdomen non-tender to palpation, normal bowel sounds, no masses present  · Liver and spleen: no hepatomegaly present, spleen not palpable  · Hernias: no hernias identified    Skin  · General Inspection: no rash, no lesions identified    Neurologic/Psychiatric  · Mental Status:  · Orientation: grossly oriented to person, place and time  · Mood and Affect: mood normal, affect appropriate    UPT neg    Assessment/Plan:  38 y.o. with AUB, amenorrhea x 5 months, previously regular menses. Unclear etiology, possibly anovulatory cycles d/t obesity.  +hypothyroidism, obesity, DM.     -reviewed labs from prior visit: TSH, PRL, FSH, LH, E2, STI testing wnl  -menstrual calendar  -encouraged weight loss, diet, exercise  -discussed options for endometrial protection--> plan for cyclic provera 85VX x 83K per month    RTC 3 months for follow up    Xavierjos Grewal MD  9/28/2020  4:26 PM

## 2020-11-20 ENCOUNTER — DOCUMENTATION ONLY (OUTPATIENT)
Dept: FAMILY MEDICINE CLINIC | Age: 40
End: 2020-11-20

## 2020-11-20 NOTE — PROGRESS NOTES
The patient needs to renew her enrollment in PAP for Humulin N vials. The renewal paperwork and a postage paid, addressed envelope were mailed to the patient on 10- with instructions to please return the completed forms to me by mail, email, or fax as soon as possible.  Lisa Muñoz RN

## 2020-12-28 ENCOUNTER — OFFICE VISIT (OUTPATIENT)
Dept: OBGYN CLINIC | Age: 40
End: 2020-12-28
Payer: SUBSIDIZED

## 2020-12-28 VITALS
BODY MASS INDEX: 37.9 KG/M2 | HEIGHT: 59 IN | SYSTOLIC BLOOD PRESSURE: 116 MMHG | WEIGHT: 188 LBS | DIASTOLIC BLOOD PRESSURE: 80 MMHG

## 2020-12-28 DIAGNOSIS — E11.21 TYPE 2 DIABETES WITH NEPHROPATHY (HCC): ICD-10-CM

## 2020-12-28 DIAGNOSIS — N93.8 DUB (DYSFUNCTIONAL UTERINE BLEEDING): Primary | ICD-10-CM

## 2020-12-28 DIAGNOSIS — E66.01 SEVERE OBESITY (BMI 35.0-39.9) WITH COMORBIDITY (HCC): ICD-10-CM

## 2020-12-28 PROCEDURE — 99213 OFFICE O/P EST LOW 20 MIN: CPT | Performed by: OBSTETRICS & GYNECOLOGY

## 2020-12-28 PROCEDURE — 3052F HG A1C>EQUAL 8.0%<EQUAL 9.0%: CPT | Performed by: OBSTETRICS & GYNECOLOGY

## 2020-12-28 NOTE — PROGRESS NOTES
Problem Visit    Stevenson Patel is a 38 y.o. G0 presenting for follow up of AUB. At her last visit on  she reported amenorrhea since 2020. Pt is s/p ESSURE. Had U/S , normal. Pt with PMH of thyroid dz, but most recent TSH wnl. Reports weight has been stable, but has noted increased abdominal bloating. Denies VMS, vaginal dryness, or other signs of menopause. Pt is sexually active only with , STI testing negative. TSH, PRL, FSH, LH, E2 wnl. No clinical signs of hyperandrogenism. Plan made at last visit to start Provera x 10d on monthly schedule, which she has been taking as prescribed. She reports her periods have been very long, stating November cycle started on 11/10 and lasted for one month. Her most recent course of provera was completed on 12/10, with a period that started on 12/15-. Frustrated by prolonged bleeding    Ob/Gyn Hx:   A0   LMP-2020, then 19  Menses- previously regular, monthly, most recently with amenorrhea x 5 months followed by heavy prolonged menses with Provera  Contraception-ESSURE  STI- yes  ? SA-yes    Health maintenance:  [id number] normal   Mammo-2019    Past Medical History:   Diagnosis Date    Diabetes ([address]     GDM (gestational diabetes mellitus)     IUD (intrauterine device) since      Essure       Past Surgical History:   Procedure Laterality Date    HX APPENDECTOMY         History reviewed. No pertinent family history.     Social History     Socioeconomic History    Marital status:      Spouse name: Not on file    Number of children: Not on file    Years of education: Not on file    Highest education level: Not on file   Occupational History    Not on file   Social Needs    Financial resource strain: Not on file    Food insecurity     Worry: Not on file     Inability: Not on file    Transportation needs     Medical: Not on file     Non-medical: Not on file   Tobacco Use    Smoking status: Never Smoker    Smokeless tobacco: Never Used   Substance and Sexual Activity    Alcohol use: No     Alcohol/week: 0.0 standard drinks    Drug use: No    Sexual activity: Yes     Partners: Male     Birth control/protection: None   Lifestyle    Physical activity     Days per week: Not on file     Minutes per session: Not on file    Stress: Not on file   Relationships    Social connections     Talks on phone: Not on file     Gets together: Not on file     Attends Voodoo service: Not on file     Active member of club or organization: Not on file     Attends meetings of clubs or organizations: Not on file     Relationship status: Not on file    Intimate partner violence     Fear of current or ex partner: Not on file     Emotionally abused: Not on file     Physically abused: Not on file     Forced sexual activity: Not on file   Other Topics Concern    Not on file   Social History Narrative    She has 2 girls, age 9 and 8. One of her girls has urinary retention along with frequent UTIs, on miralax for probable constipation. Mom expressed interest in seeing Taryn Darlingod regarding coping with her child's condition. Current Outpatient Medications   Medication Sig Dispense Refill    insulin NPH (NOVOLIN N, HUMULIN N) 100 unit/mL injection Sig: use 24 units qhs; max daily dose 50 units for TPC 1 Vial 11    metFORMIN ER (GLUCOPHAGE XR) 500 mg tablet Take 4 Tabs by mouth daily (with dinner). [place] 4 pastillas con hong 360 Tab 1    levothyroxine (SYNTHROID) 88 mcg tablet Take 1 Tab by mouth Daily (before breakfast). For thyroid. British sig 90 Tab 3    insulin syringe-needle U-100 1/2 mL 31 gauge x 15/64\" syrg 1 Syringe by Does Not Apply route daily. 90 Pen Needle 3    ibuprofen (MOTRIN) 600 mg tablet Take 1 Tab by mouth every six (6) hours as needed for Pain. 60 Tab 0    loratadine (CLARITIN) 10 mg tablet Take 1 Tab by mouth daily. For allergies;  [place] 1 tab diario para alergias 30 Tab 5    Blood-Glucose Meter monitoring kit Sig: check blood sugar bid, please provide relion brand for self pay patient 1 Kit 0    prenatal vit-calcium-iron-fa (PRENATAL PLUS) 29 mg iron- 1 mg tab tablet Take 1 Tab by mouth daily.  Please provide $4 formulary 90 Tab 1    Arm Brace (WRIST BRACE MEDIUM) misc Use PRN for wrist pain 1 Each 0       No Known Allergies    Review of Systems - History obtained from the patient  Constitutional: negative for weight loss, fever, night sweats  HEENT: negative for hearing loss, earache, congestion, snoring, sorethroat  CV: negative for chest pain, palpitations, edema  Resp: negative for cough, shortness of breath, wheezing  GI: negative for change in bowel habits, abdominal pain, black or bloody stools  : negative for frequency, dysuria, hematuria, vaginal discharge, +prolonged menses on cyclic provera  MSK: negative for back pain, joint pain, muscle pain  Breast: negative for breast lumps, nipple discharge, galactorrhea  Skin :negative for itching, rash, hives  Neuro: negative for dizziness, headache, confusion, weakness  Psych: negative for anxiety, depression, change in mood  Heme/lymph: negative for bleeding, bruising, pallor    Physical Exam    Visit Vitals  /78 (BP 1 Location: Left arm, BP Patient Position: Sitting)   Wt 191 lb (86.6 kg)   BMI 38.40 kg/m²       Constitutional  · Appearance: well-nourished, well developed, alert, in no acute distress, +centripital obesity    HENT  · Head and Face: appears normal    Neck  · Inspection/Palpation: normal appearance, no masses or tenderness  · Lymph Nodes: no lymphadenopathy present  · Thyroid: gland size normal, nontender, no nodules or masses present on palpation    Chest  · Respiratory Effort: non-labored breathing  · Auscultation: CTAB, normal breath sounds    Cardiovascular  · Heart:  · Auscultation: regular rate and rhythm without murmur  · Extremities: no peripheral edema    Gastrointestinal  · Abdominal Examination: abdomen non-tender to palpation, normal bowel sounds, no masses present  · Liver and spleen: no hepatomegaly present, spleen not palpable  · Hernias: no hernias identified    Skin  · General Inspection: no rash, no lesions identified    Neurologic/Psychiatric  · Mental Status:  · Orientation: grossly oriented to person, place and time  · Mood and Affect: mood normal, affect appropriate    UPT neg    Assessment/Plan:  38 y.o. with AUB (previously with regular menses, then amenorrhea x 5 months, now with prolonged menses on cyclic provera). Unclear etiology, possibly anovulatory cycles d/t obesity.  +hypothyroidism, obesity, DM.     -menstrual calendar  -encouraged weight loss, diet, exercise  -continue cyclic provera for now, discussed that bleeding pattern may improve the longer she stays on medication   -also reviewed other options for endometrial protection including Mirena IUD  -TVUS referral    RTC 4 wks for US and follow up    Keli Austin MD  12/28/2020  2:53 PM

## 2020-12-28 NOTE — PATIENT INSTRUCTIONS
Sangrado uterino anormal: Instrucciones de cuidado Abnormal Uterine Bleeding: Care Instructions Instrucciones de cuidado El sangrado uterino anormal (AUB, por jerilyn siglas en inglés) es un sangrado irregular del útero que dura más o es más intenso de lo normal o que no ocurre en el momento habitual para usted. A veces, se debe a cambios en los niveles hormonales. También puede estar causado por crecimientos en el útero, tales mohan fibromas o pólipos. A veces no se puede encontrar la causa. Podría tener mucho sangrado cuando no está esperando foster período. Foster médico puede sugerirle scott prueba de embarazo si savi que está [place]. La atención de seguimiento es scott parte clave de foster tratamiento y seguridad. Asegúrese de hacer y acudir a todas las citas, y llame a foster médico si está teniendo problemas. También es scott buena idea saber los resultados de jerilyn exámenes y mantener scott lista de los medicamentos que jessica. Cómo puede cuidarse en el hogar? · Sea liza con los medicamentos. [place] los analgésicos (medicamentos para el dolor) exactamente mohan le fueron indicados. ? Si el médico le recetó un analgésico, tómelo según las indicaciones. ? Si no está tomando un analgésico recetado, pregúntele a foster médico si puede suhail jonelle de [employer]. · Podría faltarle nilesh por la pérdida de mae. Coma scott dieta equilibrada con alto contenido de nilesh y vitamina C. Entre los alimentos ricos en nilesh se encuentran las yasir moody, los [place], los [place], los frijoles (habichuelas) y las verduras de hojas verdes. Pregúntele a foster médico si necesita suhail pastillas de nilesh o un multivitamínico. 

Cuándo debe pedir ayuda? Llame al 911 en cualquier momento que considere que necesita atención de [place]. Por ejemplo, llame si: 
  · Se desmayó (perdió el conocimiento). Llame a foster médico ahora mismo o busque atención médica inmediata si: 
  · Tiene dolor repentino e intenso en el abdomen o la pelvis.   · Tiene sangrado vaginal intenso. Empapa jerilyn toallas sanitarias o tampones habituales cada hora fatemeh 2 o más horas.  
  · Siente mareos o aturdimiento, o que está a punto de desmayarse. Preste especial atención a los cambios en foster frederic y asegúrese de comunicarse con foster médico si: 
  · Tiene un dolor nuevo en el abdomen o la pelvis.  
  · Tiene fiebre.  
  · El sangrado empeora o dura más de 1 semana.  
  · Piensa que podría estar embarazada. Dónde puede encontrar más información en inglés? Bernardo Scherer a http://www.[employer]/ Jacob Eller Z446 en la búsqueda para aprender más acerca de \"Sangrado uterino anormal: Instrucciones de cuidado. \" Revisado: 8 noviembre, 2019               Versión del contenido: 12.6 © 5463-0270 [employer]wise[employer]. Las instrucciones de cuidado fueron adaptadas bajo licencia por [employer] (which disclaims liability or warranty for this information). Si usted tiene [place] afección médica o sobre estas instrucciones, siempre pregunte a foster profesional de frederic. Healthwise, Incorporated niega toda garantía o responsabilidad por foster uso de esta información.

## 2021-01-08 ENCOUNTER — TELEPHONE (OUTPATIENT)
Dept: FAMILY MEDICINE CLINIC | Age: 41
End: 2021-01-08

## 2021-01-08 NOTE — TELEPHONE ENCOUNTER
Patient called to be scheduled for a check-up with doctor since she had not heard from us since M[place] 8/2020. She called about insulin, metformin side affects and needing follow-up at this time. No to all travel & exposure screening.  Gave her all details of her appointment at [facility].

## 2021-01-08 NOTE — TELEPHONE ENCOUNTER
While calling for an appointment with provider, she inquired about a lab bill that she received. She has access now (which has not ) and doesn't know why she got bill at this time.

## 2021-01-12 ENCOUNTER — OFFICE VISIT (OUTPATIENT)
Dept: FAMILY MEDICINE CLINIC | Age: 41
End: 2021-01-12

## 2021-01-12 DIAGNOSIS — Z71.89 COUNSELING AND COORDINATION OF CARE: Primary | ICD-10-CM

## 2021-01-12 PROCEDURE — 99080 SPECIAL REPORTS OR FORMS: CPT | Performed by: FAMILY MEDICINE

## 2021-01-14 ENCOUNTER — HOSPITAL ENCOUNTER (OUTPATIENT)
Dept: LAB | Age: 41
Discharge: HOME | End: 2021-01-14

## 2021-01-14 ENCOUNTER — OFFICE VISIT (OUTPATIENT)
Dept: FAMILY MEDICINE CLINIC | Age: 41
End: 2021-01-14

## 2021-01-14 VITALS
DIASTOLIC BLOOD PRESSURE: 87 MMHG | WEIGHT: 188.4 LBS | HEIGHT: 59 IN | BODY MASS INDEX: 37.98 KG/M2 | HEART RATE: 88 BPM | SYSTOLIC BLOOD PRESSURE: 128 MMHG | OXYGEN SATURATION: 98 % | TEMPERATURE: 98.9 F

## 2021-01-14 DIAGNOSIS — E03.9 HYPOTHYROIDISM, UNSPECIFIED TYPE: ICD-10-CM

## 2021-01-14 DIAGNOSIS — Z79.4 TYPE 2 DIABETES MELLITUS WITH HYPERGLYCEMIA, WITH LONG-TERM CURRENT USE OF INSULIN (HCC): Primary | ICD-10-CM

## 2021-01-14 DIAGNOSIS — E11.65 TYPE 2 DIABETES MELLITUS WITH HYPERGLYCEMIA, WITH LONG-TERM CURRENT USE OF INSULIN (HCC): Primary | ICD-10-CM

## 2021-01-14 DIAGNOSIS — Z23 ENCOUNTER FOR IMMUNIZATION: ICD-10-CM

## 2021-01-14 PROBLEM — E11.21 TYPE 2 DIABETES WITH NEPHROPATHY (HCC): Status: RESOLVED | Noted: 2018-05-22 | Resolved: 2021-01-14

## 2021-01-14 LAB — GLUCOSE POC: NORMAL MG/DL

## 2021-01-14 PROCEDURE — 90686 IIV4 VACC NO PRSV 0.5 ML IM: CPT | Performed by: FAMILY MEDICINE

## 2021-01-14 PROCEDURE — 82043 UR ALBUMIN QUANTITATIVE: CPT

## 2021-01-14 PROCEDURE — 80048 BASIC METABOLIC PNL TOTAL CA: CPT

## 2021-01-14 PROCEDURE — 82962 GLUCOSE BLOOD TEST: CPT | Performed by: FAMILY MEDICINE

## 2021-01-14 PROCEDURE — 90471 IMMUNIZATION ADMIN: CPT | Performed by: FAMILY MEDICINE

## 2021-01-14 PROCEDURE — 84443 ASSAY THYROID STIM HORMONE: CPT

## 2021-01-14 PROCEDURE — 99214 OFFICE O/P EST MOD 30 MIN: CPT | Performed by: FAMILY MEDICINE

## 2021-01-14 PROCEDURE — 84439 ASSAY OF FREE THYROXINE: CPT

## 2021-01-14 RX ORDER — SYRINGE AND NEEDLE,INSULIN,1ML 31GX15/64"
1 SYRINGE, EMPTY DISPOSABLE MISCELLANEOUS DAILY
Qty: 90 PEN NEEDLE | Refills: 3 | Status: SHIPPED | OUTPATIENT
Start: 2021-01-14

## 2021-01-14 RX ORDER — METFORMIN HYDROCHLORIDE 500 MG/1
2000 TABLET, EXTENDED RELEASE ORAL
Qty: 360 TAB | Refills: 1 | Status: SHIPPED | OUTPATIENT
Start: 2021-01-14 | End: 2021-10-04

## 2021-01-14 NOTE — PROGRESS NOTES
Josiah Crespo completed screening documentation which includes allergies to medications, latex and eggs/egg product. Patient denies fever. No contraindications for administering today's ordered vaccines. VIS given which includes instructions for adverse reaction. No adverse reaction noted at time of discharge, explained possible s/e. Reviewed symptoms indicating need to be seen in ER. See scanned vaccine encounter form. flu vaccine (s) administered by Malia Reeves RN     Copy of AVS given to patient, pt currently receives insulin through the PAP program. Explained to pt nurse with PAP program, Kesha Castillo RN will reach out re: invocana Rx. Pt requested [facility]. Will message Puja Ryan LPN for the appt for patient.

## 2021-01-14 NOTE — PROGRESS NOTES
Coordination of Care  1. Have you been to the ER, urgent care clinic since your last visit? Hospitalized since your last visit? No    2. Have you seen or consulted any other health care providers outside of the [address] [place] [address] since your last visit? Include any pap smears or colon screening. No    Does the patient need refills? NO    Learning Assessment Complete?  yes  Depression Screening complete in the past 12 months? yes     Results for orders placed or performed in visit on 01/14/21   AMB POC GLUCOSE BLOOD, BY GLUCOSE MONITORING DEVICE   Result Value Ref Range    Glucose POC nf 272 mg/dL

## 2021-01-14 NOTE — PROGRESS NOTES
Cheikh Rosado (: 1980) is a 38 y.o. female, established patient, here for evaluation of the following chief complaint(s):  Diabetes (f/up), Alopecia (x 3 months), and Immunization/Injection (flu vaccine)       ASSESSMENT/PLAN:  1. Type 2 diabetes mellitus with hyperglycemia, with long-term current use of insulin (HCC)  -     AMB POC GLUCOSE BLOOD, BY GLUCOSE MONITORING DEVICE  -     METABOLIC PANEL, BASIC; Future  -     HEMOGLOBIN A1C WITH EAG; Future  -     MICROALBUMIN, UR, RAND W/ MICROALB/CREAT RATIO; Future  -     metFORMIN ER (GLUCOPHAGE XR) 500 mg tablet; Take 4 Tabs by mouth daily (with dinner). [place] 4 pastillas con hong, Normal, Disp-360 Tab, R-1  -     insulin NPH (NOVOLIN N, HUMULIN N) 100 unit/mL injection; Sig: use 24 units qhs; max daily dose 50 units for TPC, Normal, Disp-1 Vial, R-11Not sent to pharmacy; for TPC.  -     insulin syringe-needle U-100 1/2 mL 31 gauge x 15/64\" syrg; 1 Syringe by Does Not Apply route daily. , Normal, Disp-90 Pen Needle, R-3  Uncontrolled, restart Insulin at prior dose. Discussed additional measures for control including diet, exercise, weight loss. Patient would like to try additional medication for control and so will add Invokana without PAP  2. Hypothyroidism, unspecified type  -     T4, FREE; Future  -     TSH 3RD GENERATION; Future  Normalized on last check when she was off medication. She is having more hair loss and so will recheck  3. Encounter for immunization  -     INFLUENZA VIRUS VAC QUAD,SPLIT,PRESV FREE SYRINGE IM    Follow-up and Dispositions    · Return in about 3 months (around 2021) for F2F in 3 months. .         SUBJECTIVE/OBJECTIVE:  HPI  DM:  Patient is taking Glucophage XR BID regularly without any side effects. Was having more reflux when taking them all QHS. Stopped insulin 3-4 months ago due to bruising on abdomen. Checking BS QAM: 140-160, up to 200 if she does not take Glucophage. Has eaten lunch already.   Hypothyroid: Patient stopped Synthroid because she ran out of medication. NL TSH 8/2020. Has noted more general hair loss. Abnormal Uterine Bleeding: Evaluated and followed by Gyn. On Provera monthly. Review of Systems  CONSTITUTIONAL:  Negative for chills, fever, weight change, malaise, night sweats  CARDIOVASCULAR:  Negative for palpitations, chest pain, syncope, dyspnea on exertion, leg edema  PULMONARY:  Negative for cough, shortness of breath, wheezing. NEUROLOGICAL:  Negative for dizziness, headaches, vision changes, paresthesias, numbness  ENDOCRINE:  Negative for polyuria, polydipsia, hypoglycemia    Physical Exam  Blood pressure 128/87, pulse 88, temperature 98.9 °F (37.2 °C), temperature source Oral, height 4' 10.82\" (1.494 m), weight 188 lb 6.4 oz (85.5 kg), last menstrual period 12/09/2020, SpO2 98 %. CONSTITUTIONAL:  Well developed. No apparent distress. PSYCHIATRIC: Oriented to time, place, person & situation. Appropriate mood and affect. SCALP:  No focal alopecia noted. NECK:  Normal inspection, normal palpation without any lymphadenopathy, masses, or thyromegaly   CARDIOVASCULAR:  Regular rate and rhythm. Normal S1, S2. No extra sounds. RESPIRATORY:  Normal effort. Normal ascultation without wheezing. VASCULAR:  Normal Carotid pulses without bruits. Normal Posterior Tibialis pulses. EXTREMITIES:  No edema or sores. Results for orders placed or performed in visit on 01/14/21   AMB POC GLUCOSE BLOOD, BY GLUCOSE MONITORING DEVICE   Result Value Ref Range    Glucose POC nf 272 mg/dL         An electronic signature was used to authenticate this note.   -- Mariano nA MD

## 2021-01-15 LAB
ANION GAP SERPL CALC-SCNC: 6 MMOL/L (ref 5–15)
BUN SERPL-MCNC: 12 MG/DL (ref 6–20)
BUN/CREAT SERPL: 17 (ref 12–20)
CALCIUM SERPL-MCNC: 9.4 MG/DL (ref 8.5–10.1)
CHLORIDE SERPL-SCNC: 103 MMOL/L (ref 97–108)
CO2 SERPL-SCNC: 26 MMOL/L (ref 21–32)
CREAT SERPL-MCNC: 0.72 MG/DL (ref 0.55–1.02)
CREAT UR-MCNC: 18.3 MG/DL
GLUCOSE SERPL-MCNC: 236 MG/DL (ref 65–100)
MICROALBUMIN UR-MCNC: 0.55 MG/DL
MICROALBUMIN/CREAT UR-RTO: 30 MG/G (ref 0–30)
POTASSIUM SERPL-SCNC: 3.9 MMOL/L (ref 3.5–5.1)
SODIUM SERPL-SCNC: 135 MMOL/L (ref 136–145)
T4 FREE SERPL-MCNC: 1.1 NG/DL (ref 0.8–1.5)
TSH SERPL DL<=0.05 MIU/L-ACNC: 2.7 UIU/ML (ref 0.36–3.74)

## 2021-01-15 NOTE — PROGRESS NOTES
Diabetes is uncontrolled and so would resume insulin as we discussed and follow up in 3 months. Thyroid levels are still normal and so she does not need thyroid hormone replacement at this time.

## 2021-01-19 NOTE — PROGRESS NOTES
Tc to the pt. Int # [id number]. The pt was called and given her lab results message from the provider. She was told she would need to resume her insulin as the Dr had discussed with her during her appt. The pt was told her thyroid levels were normal. She does not need thyroid replacement at this time. The pt was told the provider recommends a follow up in 3 month's.  Opal Lester RN'

## 2021-01-20 ENCOUNTER — DOCUMENTATION ONLY (OUTPATIENT)
Dept: FAMILY MEDICINE CLINIC | Age: 41
End: 2021-01-20

## 2021-01-21 NOTE — PROGRESS NOTES
Patient was receiving Humulin N vials through PAP. Her application has . I mailed her the enrollment renewal paperwork in , but have not received it back from her yet. T/C made to the patient with [facility]  [id number] to follow-up on the PAP paperwork. The patient stated that she received the forms, but was having difficulty with the proof of income requested and then temporarily misplaced the paperwork. We reviewed the necessary information and she stated that she now has everything that is needed and will mail the completed PAP applicaiton to me tomorrow.  Purnima Phillips RN

## 2021-01-28 ENCOUNTER — TELEPHONE (OUTPATIENT)
Dept: FAMILY MEDICINE CLINIC | Age: 41
End: 2021-01-28

## 2021-01-29 ENCOUNTER — OFFICE VISIT (OUTPATIENT)
Dept: FAMILY MEDICINE CLINIC | Age: 41
End: 2021-01-29

## 2021-01-29 DIAGNOSIS — Z71.89 COUNSELING AND COORDINATION OF CARE: Primary | ICD-10-CM

## 2021-01-29 PROCEDURE — 99080 SPECIAL REPORTS OR FORMS: CPT | Performed by: NURSE PRACTITIONER

## 2021-01-29 NOTE — PROGRESS NOTES
Patient has been prescreened for [facility] application. Patient has been scheduled for S4S to complete application process.

## 2021-02-01 ENCOUNTER — OFFICE VISIT (OUTPATIENT)
Dept: FAMILY MEDICINE CLINIC | Age: 41
End: 2021-02-01

## 2021-02-01 DIAGNOSIS — Z71.89 COUNSELING AND COORDINATION OF CARE: Primary | ICD-10-CM

## 2021-02-01 PROCEDURE — 99080 SPECIAL REPORTS OR FORMS: CPT | Performed by: NURSE PRACTITIONER

## 2021-02-01 NOTE — PROGRESS NOTES
Financial screening for [facility] application has been completed. Patient has been scheduled by Nasra Mandel for Feb. 28/21 at 9:00 a.m. appointment information was given to patient at the time of financial screening with JOCELYN.

## 2021-02-02 ENCOUNTER — OFFICE VISIT (OUTPATIENT)
Dept: FAMILY MEDICINE CLINIC | Age: 41
End: 2021-02-02

## 2021-02-02 DIAGNOSIS — Z71.89 COUNSELING AND COORDINATION OF CARE: Primary | ICD-10-CM

## 2021-02-02 PROCEDURE — 99080 SPECIAL REPORTS OR FORMS: CPT | Performed by: NURSE PRACTITIONER

## 2021-02-08 DIAGNOSIS — E11.65 TYPE 2 DIABETES MELLITUS WITH HYPERGLYCEMIA, WITH LONG-TERM CURRENT USE OF INSULIN (HCC): ICD-10-CM

## 2021-02-08 DIAGNOSIS — Z79.4 TYPE 2 DIABETES MELLITUS WITH HYPERGLYCEMIA, WITH LONG-TERM CURRENT USE OF INSULIN (HCC): ICD-10-CM

## 2021-02-08 NOTE — PROGRESS NOTES
TPC medication refill for insulin NPH si units qhs   And new Rx for Invokana 100 mg si po every day  Routing to PAP coordinator to process the refill requests, thank you

## 2021-02-16 ENCOUNTER — LAB ONLY (OUTPATIENT)
Dept: FAMILY MEDICINE CLINIC | Age: 41
End: 2021-02-16

## 2021-02-16 ENCOUNTER — HOSPITAL ENCOUNTER (OUTPATIENT)
Dept: LAB | Age: 41
Discharge: HOME | End: 2021-02-16

## 2021-02-16 DIAGNOSIS — E11.65 TYPE 2 DIABETES MELLITUS WITH HYPERGLYCEMIA, WITH LONG-TERM CURRENT USE OF INSULIN (HCC): ICD-10-CM

## 2021-02-16 DIAGNOSIS — Z79.4 TYPE 2 DIABETES MELLITUS WITH HYPERGLYCEMIA, WITH LONG-TERM CURRENT USE OF INSULIN (HCC): ICD-10-CM

## 2021-02-16 LAB
EST. AVERAGE GLUCOSE BLD GHB EST-MCNC: 249 MG/DL
HBA1C MFR BLD: 10.3 % (ref 4–5.6)

## 2021-02-16 PROCEDURE — 83036 HEMOGLOBIN GLYCOSYLATED A1C: CPT

## 2021-02-16 NOTE — PROGRESS NOTES
Patient was here for labs only: A1C.  Were drawn and collected with no complications on RA, as per Dr Mehnaz Caldwell MD

## 2021-02-17 NOTE — PROGRESS NOTES
A1c is worse control off insulin. I would recommend using Invokana plus insulin as we discussed at prior visit. We can consider lowering insulin dose if needed at next visit.

## 2021-02-23 ENCOUNTER — OFFICE VISIT (OUTPATIENT)
Dept: OBGYN CLINIC | Age: 41
End: 2021-02-23

## 2021-02-23 VITALS
SYSTOLIC BLOOD PRESSURE: 120 MMHG | HEIGHT: 58 IN | WEIGHT: 190 LBS | DIASTOLIC BLOOD PRESSURE: 82 MMHG | BODY MASS INDEX: 39.88 KG/M2

## 2021-02-23 DIAGNOSIS — N92.1 MENORRHAGIA WITH IRREGULAR CYCLE: Primary | ICD-10-CM

## 2021-02-23 LAB
HCG URINE, QL. (POC): NEGATIVE
VALID INTERNAL CONTROL?: YES

## 2021-02-23 PROCEDURE — 99213 OFFICE O/P EST LOW 20 MIN: CPT | Performed by: OBSTETRICS & GYNECOLOGY

## 2021-02-23 PROCEDURE — 76830 TRANSVAGINAL US NON-OB: CPT | Performed by: OBSTETRICS & GYNECOLOGY

## 2021-02-23 PROCEDURE — 81025 URINE PREGNANCY TEST: CPT | Performed by: OBSTETRICS & GYNECOLOGY

## 2021-02-23 PROCEDURE — 58100 BIOPSY OF UTERUS LINING: CPT | Performed by: OBSTETRICS & GYNECOLOGY

## 2021-02-23 RX ORDER — NORETHINDRONE ACETATE AND ETHINYL ESTRADIOL 1MG-20(21)
1 KIT ORAL DAILY
Qty: 3 DOSE PACK | Refills: 3 | Status: SHIPPED | OUTPATIENT
Start: 2021-02-23 | End: 2021-03-23

## 2021-02-23 NOTE — PROGRESS NOTES
Problem Visit    Lavonne Caballero is a 38 y.o. G0 presenting for ultrasound and follow up of AUB. Long history of irregular menses and prolonged several month episode of amenorrhea in . Currently inducing menses with cyclic provera. However, some months with prolonged episodes of bleeding. Interested in other options for regulating menses. Ultrasound today showing normal uterus, 11mm EMS, however, uterus retroflexed and difficulty to visualize due to position. No polyps or fibroids identified. TSH, PRL, FSH, LH, E2 wnl at prior visit, has not had PCOS labs checked, no clinical signs of hyperandrogenism. PMH of thyroid dz, but most recent TSH wnl. Reports weight has been stable, but has noted increased abdominal bloating. TV ULTRASOUND 21  THE UTERUS IS ANTEVERTED, NORMAL IN SIZE AND ECHOGENICITY. SUBOPTIMAL VIEWS DUE TO UTERINE  POSITION AND BOWEL GAS. THE ENDOMETRIUM MEASURES 11MM IN THICKNESS. NO MASSES OR ABNORMALITIES ARE SEEN. RIGHT OVARY APPEARS WNL. LEFT OVARY NOT SEEN DUE TO BOWEL GAS. NO FREE FLUID IS SEEN IN THE CDS. Ob/Gyn Hx:   A0   LMP-21  Menses- irregular  Contraception-ESSURE  STI- yes  ? SA-yes, w/     Health maintenance:  Pap-19 NILM  Mammo-19 B1    Past Medical History:   Diagnosis Date    Diabetes ([facility] [address]     GDM (gestational diabetes mellitus)     IUD (intrauterine device) since      Essure       Past Surgical History:   Procedure Laterality Date    HX APPENDECTOMY         History reviewed. No pertinent family history.     Social History     Socioeconomic History    Marital status:      Spouse name: Not on file    Number of children: Not on file    Years of education: Not on file    Highest education level: Not on file   Occupational History    Not on file   Social Needs    Financial resource strain: Not on file    Food insecurity     Worry: Not on file     Inability: Not on file    Transportation needs     Medical: Not on file     Non-medical: Not on file   Tobacco Use    Smoking status: Never Smoker    Smokeless tobacco: Never Used   Substance and Sexual Activity    Alcohol use: No     Alcohol/week: 0.0 standard drinks    Drug use: No    Sexual activity: Yes     Partners: Male     Birth control/protection: None   Lifestyle    Physical activity     Days per week: Not on file     Minutes per session: Not on file    Stress: Not on file   Relationships    Social connections     Talks on phone: Not on file     Gets together: Not on file     Attends Amish service: Not on file     Active member of club or organization: Not on file     Attends meetings of clubs or organizations: Not on file     Relationship status: Not on file    Intimate partner violence     Fear of current or ex partner: Not on file     Emotionally abused: Not on file     Physically abused: Not on file     Forced sexual activity: Not on file   Other Topics Concern    Not on file   Social History Narrative    She has 2 girls, age 9 and 8. One of her girls has urinary retention along with frequent UTIs, on miralax for probable constipation. Mom expressed interest in seeing Lico Zamudio regarding coping with her child's condition. Current Outpatient Medications   Medication Sig Dispense Refill    insulin NPH (NOVOLIN N, HUMULIN N) 100 unit/mL injection Sig: use 24 units qhs; max daily dose 50 units for TPC 1 Vial 11    metFORMIN ER (GLUCOPHAGE XR) 500 mg tablet Take 4 Tabs by mouth daily (with dinner). [place] 4 pastillas con hong 360 Tab 1    levothyroxine (SYNTHROID) 88 mcg tablet Take 1 Tab by mouth Daily (before breakfast). For thyroid. Lithuanian sig 90 Tab 3    insulin syringe-needle U-100 1/2 mL 31 gauge x 15/64\" syrg 1 Syringe by Does Not Apply route daily. 90 Pen Needle 3    ibuprofen (MOTRIN) 600 mg tablet Take 1 Tab by mouth every six (6) hours as needed for Pain. 60 Tab 0    loratadine (CLARITIN) 10 mg tablet Take 1 Tab by mouth daily.  For allergies; Gina 1 tab diario para alergias 30 Tab 5    Blood-Glucose Meter monitoring kit Sig: check blood sugar bid, please provide relion brand for self pay patient 1 Kit 0    prenatal vit-calcium-iron-fa (PRENATAL PLUS) 29 mg iron- 1 mg tab tablet Take 1 Tab by mouth daily.  Please provide $4 formulary 90 Tab 1    Arm Brace (WRIST BRACE MEDIUM) misc Use PRN for wrist pain 1 Each 0       No Known Allergies    Review of Systems - History obtained from the patient  Constitutional: negative for weight loss, fever, night sweats  HEENT: negative for hearing loss, earache, congestion, snoring, sorethroat  CV: negative for chest pain, palpitations, edema  Resp: negative for cough, shortness of breath, wheezing  GI: negative for change in bowel habits, abdominal pain, black or bloody stools  : negative for frequency, dysuria, hematuria, vaginal discharge, +prolonged menses on cyclic provera  MSK: negative for back pain, joint pain, muscle pain  Breast: negative for breast lumps, nipple discharge, galactorrhea  Skin :negative for itching, rash, hives  Neuro: negative for dizziness, headache, confusion, weakness  Psych: negative for anxiety, depression, change in mood  Heme/lymph: negative for bleeding, bruising, pallor    Physical Exam  Visit Vitals  /82   Ht 4' 10\" (1.473 m)   Wt 190 lb (86.2 kg)   LMP 02/06/2020   BMI 39.71 kg/m²     PHYSICAL EXAMINATION    Constitutional  · Appearance: well-nourished, well developed, alert, in no acute distress, +obesity    HENT  · Head and Face: appears normal    Chest  · Respiratory Effort: breathing labored  · Auscultation: normal breath sounds    Cardiovascular  · Heart:  · Auscultation: regular rate and rhythm without murmur    Gastrointestinal  · Abdominal Examination: abdomen non-tender to palpation, normal bowel sounds, no masses present  · Liver and spleen: no hepatomegaly present, spleen not palpable  · Hernias: no hernias identified    Genitourinary  · External Genitalia: normal appearance for age, no discharge present, no tenderness present, no inflammatory lesions present, no masses present, no atrophy present  · Vagina: normal vaginal vault without central or paravaginal defects, no discharge present, no inflammatory lesions present, no masses present  · Bladder: non-tender to palpation  · Urethra: appears normal  · Cervix: normal   · Uterus: normal size, shape and consistency  · Adnexa: no adnexal tenderness present, no adnexal masses present  · Perineum: perineum within normal limits, no evidence of trauma, no rashes or skin lesions present  · Anus: anus within normal limits, no hemorrhoids present  · Inguinal Lymph Nodes: no lymphadenopathy present    Skin  · General Inspection: no rash, no lesions identified    Neurologic/Psychiatric  · Mental Status:  · Orientation: grossly oriented to person, place and time  · Mood and Affect: mood normal, affect appropriate    Recent Results (from the past 12 hour(s))   AMB POC URINE PREGNANCY TEST, VISUAL COLOR COMPARISON    Collection Time: 02/23/21  2:39 PM   Result Value Ref Range    VALID INTERNAL CONTROL POC Yes     HCG urine, Ql. (POC) Negative Negative         Assessment/Plan:  38 y.o. with AUB. Unclear etiology, possibly anovulatory cycles d/t obesity. -TVUS findings reviewed with pt today  -EMBx performed, pathology pending  -PCOS labs today  -discussed alternatives to cyclic provera for menstrual control including LARCs  -pt interested in trial of combined low-dose OCPs to help minimize prolonged episodes of vaginal bleeding (no CI to estrogen identified)  -menstrual calendar, bleeding precautions  -encouraged weight loss, diet, exercise    RTC 3 months for follow up after initiation of OCPs    Vesna Marquez MD  2/23/2021  4:23 PM       Procedure note: Endometrial biopsy    Renetta Vanegas Nathalie is a No obstetric history on file. ,  38 y.o. female  Patient's last menstrual period was 02/06/2020.   The patient has a history of The encounter diagnosis was Menorrhagia with irregular cycle. and presents for an endometrial biopsy. Indications:   After the indications, risks, benefits, and alternatives to performing an endometrial biopsy were explained to the patient, her questions were answered and informed consent was obtained. Procedure: The patient was placed on the table in the dorsal lithotomy position. A bimanual exam showed the uterus to be anterior. The uterus was not enlarged. A speculum was placed in the vagina. The cervix was visualized and prepped with zephrin. A tenaculum was  placed on the anterior lip of the cervix for traction. It was not necessary to dilate the cervix. A pipelle was passed through the endocervical canal without difficulty. The uterus was sounded to 7.5 cm's. A small amount of tissue was returned. This tissue was placed in formalin and sent to pathology. It was felt that an adequate sample was obtained. The patient tolerated the procedure well and she reported mild cramping. The tenaculum and speculum were removed. Post Procedural Status: The patient was observed for 10 minutes after the procedure. She had mild cramping at the time of discharge. There were no complications. The patient was discharged in stable condition.

## 2021-02-23 NOTE — PATIENT INSTRUCTIONS
Períodos menstruales abundantes: Instrucciones de cuidado Heavy Menstrual Periods: Care Instructions Instrucciones de cuidado Muchas mujeres tienen períodos menstruales abundantes y cólicos dolorosos. Para algunas mujeres esto significa eliminar coágulos de mae de gran tamaño y cambiarse la toalla sanitaria o el tampón con frecuencia. También pueden tener períodos que serna más de 7 [address] Un cambio hormonal o scott irritación uterina puede causar sangrado abundante. Las mujeres que tienen exceso de peso son más propensas a tener períodos menstruales abundantes. Sin embargo, quizá no haya scott causa específica para los períodos menstruales abundantes. Harris médico podría recomendarle tratamientos hormonales para frenar o detener jerilyn períodos. Si lo que está causando el sangrado intenso es un fibroma uterino (crecimiento que no es cáncer), harris médico podría recomendarle cirugía u otros tratamientos para extraerlo. Ya que la pérdida de mae por los períodos menstruales abundantes puede hacer que usted se sienta muy cansada y débil (Christelle), harris médico podría recomendarle que tome nilesh adicional. 
La atención de seguimiento es scott parte clave de harris tratamiento y seguridad. Asegúrese de hacer y acudir a todas las citas, y llame a harris médico si está teniendo problemas. También es scott buena idea saber los resultados de jerilyn exámenes y mantener scott lista de los medicamentos que jessica. Cómo puede cuidarse en el hogar? · Descanse bastante. · Lleve un registro de jerilyn períodos. Anote cuándo comienza y cuándo termina harris período, y [de-identified] flujo tiene. [place] implica contar la cantidad de toallas sanitarias y tampones que [place]. Anote si están empapados. Anote cualquier otro síntoma. Marizol Carson registro a jerilyn citas con el médico. 
· [employer] medicamentos exactamente mohan le fueron recetados. Llame a harris médico si savi estar teniendo problemas con harris medicamento. · [employer] analgésicos (medicamentos para el dolor) exactamente según las indicaciones. ? Si el médico le recetó un analgésico, tómelo según las indicaciones. ? Si no está tomando un analgésico recetado, pregúntele a foster médico si puede suhail jonelle de [employer]. · Trate de alcanzar un peso saludable. Si está tratando de bajar de [place], hágalo poco a poco, con la asesoría del médico. 
· Si está tomando pastillas de nilesh: ? Trate de suhail las pastillas aproximadamente 1 hora antes o 2 horas después de comer. Sin embargo, podría necesitar suhail el nilesh con un poco de comida para evitar el malestar estomacal. 
? La vitamina C (de alimentos o pastillas) ayuda a foster organismo a absorber el nilesh. Trate de suhail las pastillas de nilesh con un vaso de jugo de naranja u otra fruta cítrica. ? No tome antiácidos, leche o bebidas con cafeína (mohan café, té o bebidas de cola) al mismo tiempo o 2 horas antes o después de deandra tomado las pastillas de nilesh. Estos pueden dificultar la absorción del nilesh en el organismo. ? Las pastillas de nilesh pueden causar [employer], mohan acidez [place], [place], diarrea, estreñimiento y retortijones. Asegúrese de beber abundantes líquidos e incluya en foster dieta diaria frutas, verduras y [place]. ? Si olvida suhail foster pastilla de nilesh, no tome scott dosis doble la próxima vez. 
? Mantenga las pastillas de nilesh fuera del alcance de los niños pequeños. La sobredosis de nilesh puede ser [employer]. Cuándo debe pedir ayuda? Llame al 911 en cualquier momento que considere que necesita atención de [place]. Por ejemplo, llame si: 
  · Se desmayó (perdió el conocimiento). Llame a foster médico ahora mismo o busque atención médica inmediata si: 
  · Tiene dolor repentino e intenso en el abdomen o la pelvis.  
  · Tiene sangrado vaginal intenso. Empapa jerilyn toallas sanitarias o tampones habituales cada hora fatemeh 2 o más horas.   · Siente mareos o aturdimiento, o que está a punto de [place]. Preste especial atención a los cambios en foster frederic y asegúrese de comunicarse con foster médico si: 
  · Tiene un dolor nuevo en el abdomen o la pelvis.  
  · Tiene fiebre.  
  · El sangrado empeora o dura más de 1 semana.  
  · Piensa que podría estar embarazada. Dónde puede encontrar más información en inglés? Kerri Howell a http://www.gray.com/ Escrifloresita F477 en la búsqueda para aprender más acerca de \"Períodos menstruales abundantes: Instrucciones de cuidado. \" Revisado: 8 noviembre, 2019               Versión del contenido: 12.6 © 4514-1330 epicurio, [employer]. Las instrucciones de cuidado fueron adaptadas bajo licencia por [facility] (which disclaims liability or warranty for this information). Si usted tiene [place] afección médica o sobre estas instrucciones, siempre pregunte a foster profesional de frederic. epicurio, [employer] niega toda garantía o responsabilidad por foster uso de esta información.

## 2021-02-25 ENCOUNTER — DOCUMENTATION ONLY (OUTPATIENT)
Dept: FAMILY MEDICINE CLINIC | Age: 41
End: 2021-02-25

## 2021-02-25 LAB — DHEA-S SERPL-MCNC: 157 UG/DL (ref 57.3–279.2)

## 2021-02-25 NOTE — PROGRESS NOTES
Patient's application for Invokana 100mg tabs was faxed to PAP on 02-20-21. Fax confirmation received. Her application for Humulin N vials, max daily dose of 35u, was faxed to PAP on 02-23-21. Fax confirmation received.  Chichi Zhu RN

## 2021-02-26 LAB
COMMENT, TESC2: NORMAL
TESTOST FREE SERPL-MCNC: 3 PG/ML (ref 0–4.2)
TESTOST SERPL-MCNC: 15 NG/DL (ref 8–48)

## 2021-02-27 LAB
CPT CODES, 490044: NORMAL
CPT DISCLAIMER: NORMAL
CYTOLOGY SPEC DOC CYTO: NORMAL
DHEA SERPL-MCNC: 313 NG/DL (ref 31–701)
DIAGNOSIS SYNOPSIS:: NORMAL
DX ICD CODE: NORMAL
PATH REPORT.GROSS SPEC: NORMAL
PATHOLOGIST NAME: NORMAL
PDF IMAGE, 807507: NORMAL
SPECIMEN SOURCE: NORMAL

## 2021-02-28 LAB — 17OHP SERPL-MCNC: 43 NG/DL

## 2021-03-01 NOTE — PROGRESS NOTES
17OHP, DHEA wnl    EMBx - scant fragments of benign inactive endometrium and small fragment of benign lower uterine segment

## 2021-03-02 NOTE — PROGRESS NOTES
Left a generic message for patient today asking the patient to call back to speak with a nurse for message from provider.

## 2021-03-09 ENCOUNTER — DOCUMENTATION ONLY (OUTPATIENT)
Dept: FAMILY MEDICINE CLINIC | Age: 41
End: 2021-03-09

## 2021-03-09 NOTE — PROGRESS NOTES
T/C made to [employer] [facility] to check on the status of the patient's recent application for Invokana 100mg. The patient's application was approved for one year and a Pharmacy Card was mailed to her home address. A fax confirming her enrollment approval should have been sent to the [facility] office, but I have not received one. The patient takes the Pharmacy Card to her usual pharmacy each month to receive a free 30 day supply of Invokana. We need to send the prescription to her [address] [facility] on [address] [facility]. I will call the patient to make sure that she has received the Pharmacy Card from [employer] and understands how to use it.  Bridget Matute RN

## 2021-03-10 ENCOUNTER — DOCUMENTATION ONLY (OUTPATIENT)
Dept: FAMILY MEDICINE CLINIC | Age: 41
End: 2021-03-10

## 2021-03-10 NOTE — PROGRESS NOTES
Receipt of patient's PAP order of 5 vials of Humulin N, 100u/ml, 10ml/vial, has been entered into the log book. Routing to Dr. Carlos Orozco for dose confirmation and permission to deliver to the patient.  Felicia Norton RN      insulin NPH (NOVOLIN N, HUMULIN N) 100 unit/mL injection [[id number]]     Order Details  Dose, Route, Frequency: As Directed   Dispense Quantity: 1 Vial Refills: 11    Note to Pharmacy: Not sent to pharmacy; for TPC.         Sig: Sig: use 24 units qhs; max daily dose 35 units for TPC         Start Date: 02/08/21 End Date: --   Written Date: 02/08/21 Expiration Date: --       Diagnosis Association: Type 2 diabetes mellitus with hyperglycemia, with long-term current use of insulin (HCC) (E11.65 , Z79.4)   Original Order:  insulin NPH (NOVOLIN N, HUMULIN N) 100 unit/mL injection [[id number]]   Providers    Authorizing Provider: SOULEYMANE Curiel NPI: [id number]   PRESTON #: [id number]

## 2021-03-10 NOTE — PROGRESS NOTES
Patient was called back and the below discussed with patient per Dr. Maggi Perez:  I would not change any dosing for now.  If she is having hypoglycemic spells then she could schedule a VV sooner to discuss her blood sugars and adjusting the dose of her Humulin. Dr Maggi Perez     This has been fully explained to the patient, who indicates understanding and agrees with plan. No further questions at this time. Nurse was able to scheduled in person follow up appt. Patient agree with date/time/place of appt.   Ronel Wilson RN

## 2021-03-10 NOTE — PROGRESS NOTES
Result note and recommendations from Dr. Diaz Check given to patient over the phone. Patient tells me that she is using Humulin N 22 units daily before bed, Metformin ER 500mg 4 tablets once a day, Junel Fe 1/20 once a day, and Started the Invokana 100mg tablet once a day TODAY. -Dr. Diaz Check is there any change to her medication since she is now on the Invokana, metformin and NPH insulin? Patient is due to see you next month, appointment has not been scheduled as of yet. Discussed with patient that we would call back in case you needed any changes in her medication regimen.  Mary Ring RN

## 2021-03-10 NOTE — PROGRESS NOTES
T/C made to the patient with assistance from [address]  #[id number] to review her new enrollment in the [facility] PAP for Invokana 100mg. Her application was approved for 12 months. The patient stated that she received the [employer] in the mail and used it successfully at her [address] today to pick-up a bottle of 30 tabs of Invokana 100mg. We reviewed the provider's dose instructions and the refill process for using the [employer] [facility] card each month at her [address] to receive a 30 day supply of Invokana. The patient expressed understanding and is aware that I will contact her in January, 2022 to renew her enrollment in the [facility] PAP if she is still taking Invokana.  Felicia Mcmahan RN      Medication Management (PAP-Invokana 100mg)     SOULEYMANE South  You; Dionisio Canchola MD 7 hours ago (11:58 AM)     Done, thanks     Message text        You routed conversation to Dionisio Canchola MD; SOULEYMANE South 8 hours ago (11:02 AM

## 2021-03-13 NOTE — PROGRESS NOTES
T/C made to the patient with assistance from [facility]  #[id number] to schedule delivery of her PAP order of 5 vials of Humulin N. The patient agreed to a delivery at her home today at 2:30pm and confirmed her address as the one listed in [address] [place] [address]. Delivery made to the patient as planned. She correctly repeated the Provider's dose instructions and understands to call me at [phone] in 3 months to request a refill. Due to Covid-19 precautions, the patient did not sign the PAP med pick-up slip.  Gurinder Julien RN

## 2021-04-15 ENCOUNTER — OFFICE VISIT (OUTPATIENT)
Dept: FAMILY MEDICINE CLINIC | Age: 41
End: 2021-04-15

## 2021-04-15 ENCOUNTER — HOSPITAL ENCOUNTER (OUTPATIENT)
Dept: LAB | Age: 41
Discharge: HOME | End: 2021-04-15

## 2021-04-15 VITALS
WEIGHT: 189.6 LBS | HEIGHT: 59 IN | BODY MASS INDEX: 38.22 KG/M2 | SYSTOLIC BLOOD PRESSURE: 111 MMHG | TEMPERATURE: 97 F | DIASTOLIC BLOOD PRESSURE: 73 MMHG | HEART RATE: 90 BPM

## 2021-04-15 DIAGNOSIS — Z79.4 TYPE 2 DIABETES MELLITUS WITH HYPERGLYCEMIA, WITH LONG-TERM CURRENT USE OF INSULIN (HCC): Primary | ICD-10-CM

## 2021-04-15 DIAGNOSIS — E11.65 TYPE 2 DIABETES MELLITUS WITH HYPERGLYCEMIA, WITH LONG-TERM CURRENT USE OF INSULIN (HCC): ICD-10-CM

## 2021-04-15 DIAGNOSIS — Z79.4 TYPE 2 DIABETES MELLITUS WITH HYPERGLYCEMIA, WITH LONG-TERM CURRENT USE OF INSULIN (HCC): ICD-10-CM

## 2021-04-15 DIAGNOSIS — E11.65 TYPE 2 DIABETES MELLITUS WITH HYPERGLYCEMIA, WITH LONG-TERM CURRENT USE OF INSULIN (HCC): Primary | ICD-10-CM

## 2021-04-15 LAB — GLUCOSE POC: NORMAL MG/DL

## 2021-04-15 PROCEDURE — 82962 GLUCOSE BLOOD TEST: CPT | Performed by: FAMILY MEDICINE

## 2021-04-15 PROCEDURE — 99213 OFFICE O/P EST LOW 20 MIN: CPT | Performed by: FAMILY MEDICINE

## 2021-04-15 PROCEDURE — 80048 BASIC METABOLIC PNL TOTAL CA: CPT

## 2021-04-15 RX ORDER — MEDROXYPROGESTERONE ACETATE 10 MG/1
10 TABLET ORAL DAILY
COMMUNITY
Start: 2021-03-23 | End: 2021-05-24

## 2021-04-15 NOTE — PROGRESS NOTES
Oly Capone (: 1980) is a 38 y.o. female, established patient, here for evaluation of the following chief complaint(s):  Follow-up       ASSESSMENT/PLAN:  1. Type 2 diabetes mellitus with hyperglycemia, with long-term current use of insulin (HCC)  -     AMB POC GLUCOSE BLOOD, BY GLUCOSE MONITORING DEVICE  -     METABOLIC PANEL, BASIC; Future  Improved control. Readdressed improved diet to help with control. Discussed staying hydrated. Will continue current regimen and recheck A1c in 3 months. We can increase Invokana at that time if necessary. Follow-up and Dispositions    · Return in about 3 months (around 7/15/2021) for follow up for F2F in 3 months for DM check and labs. SUBJECTIVE:  HPI  DM:  Patient is taking Glucophage XR BID, Invokana AM (x 1 month), Novolin N 24U QHS regularly without any side effects. Checking BS sporadically: -140. She states this is much better than prior. Denies hypoglycemic spells. Does have spells of feeling more hungry or she develops a headache if she takes Invokana without eating breakfast.    Review of Systems   Constitutional: Negative for diaphoresis, fatigue, fever and unexpected weight change. Respiratory: Negative for cough, chest tightness and shortness of breath. Cardiovascular: Negative for chest pain, palpitations and leg swelling. Endocrine: Positive for polyphagia. Negative for polydipsia and polyuria. Neurological: Negative for syncope, light-headedness and numbness. OBJECTIVE:  Blood pressure 111/73, pulse 90, temperature 97 °F (36.1 °C), height 4' 10.9\" (1.496 m), weight 189 lb 9.6 oz (86 kg), last menstrual period 03/15/2021. Physical Exam  CONSTITUTIONAL:  Well developed. No apparent distress. PSYCHIATRIC: Oriented to time, place, person & situation. Appropriate mood and affect. NECK:  Normal inspection, normal palpation without any lymphadenopathy, masses, or thyromegaly  CARDIOVASCULAR:  Regular rate and rhythm. Normal S1, S2. No extra sounds. RESPIRATORY:  Normal effort. Normal ascultation without wheezing. Results for orders placed or performed in visit on 04/15/21   AMB POC GLUCOSE BLOOD, BY GLUCOSE MONITORING DEVICE   Result Value Ref Range    Glucose POC nf-235 MG/DL         An electronic signature was used to authenticate this note.   -- Jenn Phan MD

## 2021-04-15 NOTE — PROGRESS NOTES
I have printed AVS and reviewed it with patient today. Lab work was completed as ordered. The patient was given her appointment for  [facility]. Patient verbalized understanding.  Christiana Arndt RN

## 2021-04-15 NOTE — PROGRESS NOTES
Coordination of Care  1. Have you been to the ER, urgent care clinic since your last visit? Hospitalized since your last visit? No    2. Have you seen or consulted any other health care providers outside of the [address] [place] [address] since your last visit? Include any pap smears or colon screening. No    Does the patient need refills? NO    Learning Assessment Complete?  yes  Depression Screening complete in the past 12 months? yes     Results for orders placed or performed in visit on 04/15/21   AMB POC GLUCOSE BLOOD, BY GLUCOSE MONITORING DEVICE   Result Value Ref Range    Glucose POC nf-235 MG/DL

## 2021-04-16 DIAGNOSIS — E11.65 TYPE 2 DIABETES MELLITUS WITH HYPERGLYCEMIA, WITHOUT LONG-TERM CURRENT USE OF INSULIN (HCC): ICD-10-CM

## 2021-04-16 LAB
ANION GAP SERPL CALC-SCNC: 8 MMOL/L (ref 5–15)
BUN SERPL-MCNC: 20 MG/DL (ref 6–20)
BUN/CREAT SERPL: 27 (ref 12–20)
CALCIUM SERPL-MCNC: 9.6 MG/DL (ref 8.5–10.1)
CHLORIDE SERPL-SCNC: 107 MMOL/L (ref 97–108)
CO2 SERPL-SCNC: 23 MMOL/L (ref 21–32)
COMMENT, HOLDF: NORMAL
CREAT SERPL-MCNC: 0.74 MG/DL (ref 0.55–1.02)
GLUCOSE SERPL-MCNC: 208 MG/DL (ref 65–100)
POTASSIUM SERPL-SCNC: 4.3 MMOL/L (ref 3.5–5.1)
SAMPLES BEING HELD,HOLD: NORMAL
SODIUM SERPL-SCNC: 138 MMOL/L (ref 136–145)

## 2021-04-22 ENCOUNTER — HOSPITAL ENCOUNTER (OUTPATIENT)
Dept: GENERAL RADIOLOGY | Age: 41
Discharge: HOME | End: 2021-04-22
Attending: FAMILY MEDICINE
Payer: SUBSIDIZED

## 2021-04-22 ENCOUNTER — OFFICE VISIT (OUTPATIENT)
Dept: FAMILY MEDICINE CLINIC | Age: 41
End: 2021-04-22

## 2021-04-22 VITALS
WEIGHT: 188.2 LBS | HEART RATE: 86 BPM | OXYGEN SATURATION: 96 % | HEIGHT: 59 IN | TEMPERATURE: 98.5 F | DIASTOLIC BLOOD PRESSURE: 75 MMHG | BODY MASS INDEX: 37.94 KG/M2 | SYSTOLIC BLOOD PRESSURE: 110 MMHG

## 2021-04-22 DIAGNOSIS — M25.522 LEFT ELBOW PAIN: Primary | ICD-10-CM

## 2021-04-22 DIAGNOSIS — M25.522 LEFT ELBOW PAIN: ICD-10-CM

## 2021-04-22 DIAGNOSIS — E11.65 TYPE 2 DIABETES MELLITUS WITH HYPERGLYCEMIA, WITHOUT LONG-TERM CURRENT USE OF INSULIN (HCC): ICD-10-CM

## 2021-04-22 DIAGNOSIS — J30.89 ENVIRONMENTAL AND SEASONAL ALLERGIES: ICD-10-CM

## 2021-04-22 LAB — GLUCOSE POC: NORMAL MG/DL

## 2021-04-22 PROCEDURE — 82962 GLUCOSE BLOOD TEST: CPT | Performed by: FAMILY MEDICINE

## 2021-04-22 PROCEDURE — 99213 OFFICE O/P EST LOW 20 MIN: CPT | Performed by: FAMILY MEDICINE

## 2021-04-22 PROCEDURE — 73080 X-RAY EXAM OF ELBOW: CPT

## 2021-04-22 RX ORDER — INSULIN PUMP SYRINGE, 3 ML
EACH MISCELLANEOUS
Qty: 1 KIT | Refills: 0 | Status: SHIPPED | OUTPATIENT
Start: 2021-04-22

## 2021-04-22 RX ORDER — DICLOFENAC SODIUM 10 MG/G
2 GEL TOPICAL 4 TIMES DAILY
Qty: 100 G | Refills: 3 | Status: SHIPPED | OUTPATIENT
Start: 2021-04-22 | End: 2021-05-24

## 2021-04-22 RX ORDER — LORATADINE 10 MG/1
10 TABLET ORAL DAILY
Qty: 30 TAB | Refills: 5 | Status: SHIPPED | OUTPATIENT
Start: 2021-04-22 | End: 2021-05-24

## 2021-04-22 NOTE — PROGRESS NOTES
Lorrie Elise (: 1980) is a 38 y.o. female, established patient, here for evaluation of the following chief complaint(s):  Arm Pain (pt c/o left arm pain x 3 days), Skin Problem (p/t c/o seasonal allergies), and Labs (lab results )       ASSESSMENT/PLAN:  1. Left elbow pain  Most pain is likely epicondylitis but with limited extension and hx of injury will xray to r/o a prior fx. Use Voltaren Gel for now. -     XR ELBOW LT MIN 3 V; Future  2. Environmental and seasonal allergies  Resume Claritin. -     loratadine (CLARITIN) 10 mg tablet; Take 1 Tab by mouth daily. For allergies; Gina 1 tab diario para alergias, Normal, Disp-30 Tab, R-5  3. Type 2 diabetes mellitus with hyperglycemia, without long-term current use of insulin ([facility])  -     AMB POC GLUCOSE BLOOD, BY GLUCOSE MONITORING DEVICE         SUBJECTIVE:  HPI  Lt arm pain:  Lt arm pain x 3 days. 1.5 years ago fell forward bracing herself with her arms outstretched. Had bruising around Lt elbow at the time. Has recurrent pain on the Lt side since that time. Most recent spell started 3 days ago. Had pain in Lt chest and scapula but now has moved to Lt elbow with more pain with lifting. No numbness or paresthesias. Itching:  Has generalized pruritis over the past few days. Worse when outside. Has hx of seasonal allergies. No rash. Review of Systems   Constitutional: Negative for chills, fatigue and fever. Respiratory: Negative for cough, shortness of breath and wheezing. Neurological: Negative for weakness and numbness. OBJECTIVE:  Blood pressure 110/75, pulse 86, temperature 98.5 °F (36.9 °C), temperature source Temporal, height 4' 10.9\" (1.496 m), weight 188 lb 3.2 oz (85.4 kg), SpO2 96 %. Physical Exam  CONSTITUTIONAL:  Well developed. No apparent distress. PSYCHIATRIC: Oriented to time, place, person & situation. Appropriate mood and affect. NECK:  Normal inspection, no pain  CARDIOVASCULAR:  Regular rate and rhythm. Normal S1, S2. No extra sounds. RESPIRATORY:  Normal effort. Normal ascultation without wheezing. VASCULAR:  Normal Carotid pulses without bruits. Normal Radial pulses. MUSCULOSKELETAL: Lt shoulder with full ROM, positive Choi, no weakness. Pain along medial and lateral epicondyles of Lt elbow and significant pain with trying to extend elbow > 160deg. Results for orders placed or performed in visit on 04/22/21   AMB POC GLUCOSE BLOOD, BY GLUCOSE MONITORING DEVICE   Result Value Ref Range    Glucose POC nf  139 MG/DL         An electronic signature was used to authenticate this note.   -- Alfonso Davis MD

## 2021-04-22 NOTE — PROGRESS NOTES
Coordination of Care  1. Have you been to the ER, urgent care clinic since your last visit? Hospitalized since your last visit? No    2. Have you seen or consulted any other health care providers outside of the [address] [place] [address] since your last visit? Include any pap smears or colon screening. No    Does the patient need refills? NO    Learning Assessment Complete?  yes  Depression Screening complete in the past 12 months? yes     Results for orders placed or performed in visit on 04/22/21   AMB POC GLUCOSE BLOOD, BY GLUCOSE MONITORING DEVICE   Result Value Ref Range    Glucose POC nf  139 MG/DL

## 2021-04-22 NOTE — PROGRESS NOTES
Elbow joint looks OK. There is inflammation in the tendon and so I would use the Voltaren Gel as we discussed at office visit.

## 2021-04-22 NOTE — PROGRESS NOTES
Avs discussed with Everett Negro by Discharge Nurse Gretchen Leyden, LPN. Discussed medication prescribed today. Pt given order for xrays to be done. prescribed today Patient verbalized understanding and has no further questions.  AVS printed and given to patient Gretchen Leyden, LPN

## 2021-04-27 NOTE — PROGRESS NOTES
T/C with the patient. I gave the patient the X-rays results as per Dr. Brina Bobby MD, following message: \"Elbow joint looks OK. There is inflammation in the tendon and so I would use the Voltaren Gel as we discussed at office visit. \" Patient verbalized understanding

## 2021-04-27 NOTE — PROGRESS NOTES
T/C with the patient. I gave the patient the lab results as per Dr Pop Warner MD, following message: \"Kidney function looks good. Stay hydrated. Follow up as scheduled. \" Patient verbalized understanding

## 2021-04-30 ENCOUNTER — IMMUNIZATION (OUTPATIENT)
Dept: FAMILY MEDICINE CLINIC | Age: 41
End: 2021-04-30

## 2021-04-30 DIAGNOSIS — Z23 ENCOUNTER FOR IMMUNIZATION: Primary | ICD-10-CM

## 2021-04-30 PROCEDURE — 0011A COVID-19, MRNA, LNP-S, PF, 100MCG/0.5ML DOSE(MODERNA): CPT | Performed by: FAMILY MEDICINE

## 2021-04-30 PROCEDURE — 91301 COVID-19, MRNA, LNP-S, PF, 100MCG/0.5ML DOSE(MODERNA): CPT | Performed by: FAMILY MEDICINE

## 2021-05-24 ENCOUNTER — OFFICE VISIT (OUTPATIENT)
Dept: OBGYN CLINIC | Age: 41
End: 2021-05-24
Payer: SUBSIDIZED

## 2021-05-24 VITALS
SYSTOLIC BLOOD PRESSURE: 116 MMHG | WEIGHT: 194 LBS | DIASTOLIC BLOOD PRESSURE: 76 MMHG | HEIGHT: 58 IN | BODY MASS INDEX: 40.72 KG/M2

## 2021-05-24 DIAGNOSIS — N92.6 IRREGULAR MENSES: Primary | ICD-10-CM

## 2021-05-24 PROCEDURE — 99213 OFFICE O/P EST LOW 20 MIN: CPT | Performed by: OBSTETRICS & GYNECOLOGY

## 2021-05-24 RX ORDER — NORETHINDRONE ACETATE AND ETHINYL ESTRADIOL 1MG-20(21)
1 KIT ORAL DAILY
Qty: 3 DOSE PACK | Refills: 3 | Status: SHIPPED | OUTPATIENT
Start: 2021-05-24 | End: 2021-06-21

## 2021-05-24 NOTE — PROGRESS NOTES
Problem Visit    Maury De La Vega is a 38 y.o. G0 presenting for follow up of AUB. Long history of irregular menses and prolonged several month episode of amenorrhea in . Previously inducing menses with cyclic provera, at prior visit decision to transition to low-dose OCPs due to prolonged episodes of bleeding, but pt never filled Rx for Junel d/t expense (reports they told her it would be $80). Pt would like to start OCPs if more affordable. She is otherwise doing well. Labs - TSH, PRL, PCOS labs, FSH, LH, E2 wnl at prior visit     EMBx - scant fragments of benign inactive endometrium and small fragment of benign lower uterine segment    TV ULTRASOUND 21  THE UTERUS IS ANTEVERTED, NORMAL IN SIZE AND ECHOGENICITY. SUBOPTIMAL VIEWS DUE TO UTERINE  POSITION AND BOWEL GAS. THE ENDOMETRIUM MEASURES 11MM IN THICKNESS. NO MASSES OR ABNORMALITIES ARE SEEN. RIGHT OVARY APPEARS WNL. LEFT OVARY NOT SEEN DUE TO BOWEL GAS. NO FREE FLUID IS SEEN IN THE CDS. Ob/Gyn Hx:   A0   LMP-21  Menses- irregular  Contraception-ESSURE  STI- yes  ? SA-yes, w/     Health maintenance:  Pap-19 NILM  Mammo-19 B1    Past Medical History:   Diagnosis Date    Diabetes ([facility] [address]     GDM (gestational diabetes mellitus)     IUD (intrauterine device) since      Essure       Past Surgical History:   Procedure Laterality Date    HX APPENDECTOMY         History reviewed. No pertinent family history.     Social History     Socioeconomic History    Marital status:      Spouse name: Not on file    Number of children: Not on file    Years of education: Not on file    Highest education level: Not on file   Occupational History    Not on file   Social Needs    Financial resource strain: Not on file    Food insecurity     Worry: Not on file     Inability: Not on file    Transportation needs     Medical: Not on file     Non-medical: Not on file   Tobacco Use    Smoking status: Never Smoker    Smokeless tobacco: Never Used   Substance and Sexual Activity    Alcohol use: No     Alcohol/week: 0.0 standard drinks    Drug use: No    Sexual activity: Yes     Partners: Male     Birth control/protection: None   Lifestyle    Physical activity     Days per week: Not on file     Minutes per session: Not on file    Stress: Not on file   Relationships    Social connections     Talks on phone: Not on file     Gets together: Not on file     Attends [employer] service: Not on file     Active member of club or organization: Not on file     Attends meetings of clubs or organizations: Not on file     Relationship status: Not on file    Intimate partner violence     Fear of current or ex partner: Not on file     Emotionally abused: Not on file     Physically abused: Not on file     Forced sexual activity: Not on file   Other Topics Concern    Not on file   Social History Narrative    She has 2 girls, age 9 and 8. One of her girls has urinary retention along with frequent UTIs, on miralax for probable constipation. Mom expressed interest in seeing [facility] regarding coping with her child's condition. Current Outpatient Medications   Medication Sig Dispense Refill    insulin NPH (NOVOLIN N, HUMULIN N) 100 unit/mL injection Sig: use 24 units qhs; max daily dose 50 units for TPC 1 Vial 11    metFORMIN ER (GLUCOPHAGE XR) 500 mg tablet Take 4 Tabs by mouth daily (with dinner). [place] 4 pastillas con hong 360 Tab 1    levothyroxine (SYNTHROID) 88 mcg tablet Take 1 Tab by mouth Daily (before breakfast). For thyroid. Tamazight sig 90 Tab 3    insulin syringe-needle U-100 1/2 mL 31 gauge x 15/64\" syrg 1 Syringe by Does Not Apply route daily. 90 Pen Needle 3    ibuprofen (MOTRIN) 600 mg tablet Take 1 Tab by mouth every six (6) hours as needed for Pain. 60 Tab 0    loratadine (CLARITIN) 10 mg tablet Take 1 Tab by mouth daily. For allergies;  Gina 1 tab diario para alergias 30 Tab 5    Blood-Glucose Meter monitoring kit Sig: check blood sugar bid, please provide relion brand for self pay patient 1 Kit 0    prenatal vit-calcium-iron-fa (PRENATAL PLUS) 29 mg iron- 1 mg tab tablet Take 1 Tab by mouth daily.  Please provide $4 formulary 90 Tab 1    Arm Brace (WRIST BRACE MEDIUM) misc Use PRN for wrist pain 1 Each 0       No Known Allergies    Review of Systems - History obtained from the patient  Constitutional: negative for weight loss, fever, night sweats  HEENT: negative for hearing loss, earache, congestion, snoring, sorethroat  CV: negative for chest pain, palpitations, edema  Resp: negative for cough, shortness of breath, wheezing  GI: negative for change in bowel habits, abdominal pain, black or bloody stools  : negative for frequency, dysuria, hematuria, vaginal discharge, +irregular menses  MSK: negative for back pain, joint pain, muscle pain  Breast: negative for breast lumps, nipple discharge, galactorrhea  Skin :negative for itching, rash, hives  Neuro: negative for dizziness, headache, confusion, weakness  Psych: negative for anxiety, depression, change in mood  Heme/lymph: negative for bleeding, bruising, pallor    Physical Exam  Visit Vitals  /76   Ht 4' 10\" (1.473 m)   Wt 194 lb (88 kg)   LMP 05/11/2021   BMI 40.55 kg/m²     PHYSICAL EXAMINATION    Constitutional  · Appearance: well-nourished, well developed, alert, in no acute distress, +obesity    HENT  · Head and Face: appears normal    Chest  · Respiratory Effort: breathing labored  · Auscultation: normal breath sounds    Cardiovascular  · Heart:  · Auscultation: regular rate and rhythm without murmur    Gastrointestinal  · Abdominal Examination: abdomen non-tender to palpation, normal bowel sounds, no masses present  · Liver and spleen: no hepatomegaly present, spleen not palpable  · Hernias: no hernias identified    Skin  · General Inspection: no rash, no lesions identified    Neurologic/Psychiatric  · Mental Status:  · Orientation: grossly oriented to person, place and time  · Mood and Affect: mood normal, affect appropriate    No results found for this or any previous visit (from the past 12 hour(s)). Assessment/Plan:  38 y.o. with AUB. Unclear etiology, possibly anovulatory cycles d/t obesity.     -EMBx results reviewed with pt today (benign)  -PCOS labs reviewed with pt today (wnl)  -trial of Junel OCPs (recommended GoodRx to make more affordable, estimated 10$ at [facility])  -menstrual calendar, bleeding precautions  -encouraged weight loss, diet, exercise    RTC 3-4 months for follow up after initiation of Kavitha Ng MD  5/24/2021  2:27 PM

## 2021-05-28 ENCOUNTER — IMMUNIZATION (OUTPATIENT)
Dept: FAMILY MEDICINE CLINIC | Age: 41
End: 2021-05-28

## 2021-05-28 DIAGNOSIS — Z23 ENCOUNTER FOR IMMUNIZATION: Primary | ICD-10-CM

## 2021-05-28 PROCEDURE — 91301 COVID-19, MRNA, LNP-S, PF, 100MCG/0.5ML DOSE(MODERNA): CPT

## 2021-05-28 PROCEDURE — 0012A COVID-19, MRNA, LNP-S, PF, 100MCG/0.5ML DOSE(MODERNA): CPT

## 2021-06-18 NOTE — PROGRESS NOTES
TPC medication authorization approval for the following:  Humulin N: sig 24 units qhs   Routing to PAP coordinator to process the refill request  Thank you

## 2021-06-23 ENCOUNTER — DOCUMENTATION ONLY (OUTPATIENT)
Dept: FAMILY MEDICINE CLINIC | Age: 41
End: 2021-06-23

## 2021-06-23 NOTE — PROGRESS NOTES
A refill request for the patient's Humulin N vials was sent to PAP and should be delivered to the [facility] office.  Slava Howe RN

## 2021-06-30 ENCOUNTER — DOCUMENTATION ONLY (OUTPATIENT)
Dept: FAMILY MEDICINE CLINIC | Age: 41
End: 2021-06-30

## 2021-06-30 NOTE — PROGRESS NOTES
Receipt of patient's PAP order of 5 vials of Humulin N, 100u/ml, 10ml/vial, has been entered into the log book. Routing to Dr. Shanique Lincoln for dose confirmation and permission to deliver to the patient.  Felicia Norton RN      insulin NPH (NOVOLIN N, HUMULIN N) 100 unit/mL injection [[id number]]     Order Details  Dose, Route, Frequency: As Directed   Dispense Quantity: 1 Vial Refills: 11    Note to Pharmacy: Not sent to pharmacy; for TPC.         Sig: Sig: use 24 units qhs; max daily dose 35 units for TPC         Start Date: 02/08/21 End Date: --   Written Date: 02/08/21 Expiration Date: --       Diagnosis Association: Type 2 diabetes mellitus with hyperglycemia, with long-term current use of insulin (HCC) (E11.65 , Z79.4)   Original Order:  insulin NPH (NOVOLIN N, HUMULIN N) 100 unit/mL injection [[id number]]   Providers    Authorizing Provider: SOULEYMANE Stephenson NPI: [id number]   PRESTON #: [id number]

## 2021-07-03 NOTE — PROGRESS NOTES
06-30-21: T/C made to the patient with assistance from [address] [place] [address] #[id number] to schedule delivery of her PAP order of Humulin N vials. The patient confirmed her address as the one listed in CC and asked for delivery on 07-02-21 at her home between 11am and 12 noon. We reviewed the provider's dose instruction and a reminder to please call me at [phone] in three months to request a refill of her Humulin N through PAP. The patient expressed understanding. Isaura Lai RN      07-02-21: I delivered the patient's PAP order of Humulin N vials to her at her home as planned. Due to Covid-19 precautions, the PAP med pick-up slip was not signed.  Isaura Lai RN

## 2021-07-14 ENCOUNTER — VIRTUAL VISIT (OUTPATIENT)
Dept: FAMILY MEDICINE CLINIC | Age: 41
End: 2021-07-14

## 2021-07-14 DIAGNOSIS — Z71.89 COUNSELING AND COORDINATION OF CARE: Primary | ICD-10-CM

## 2021-07-14 NOTE — PROGRESS NOTES
Patient has been screened and scheduled for a f2f appointment to complete AN financial screening process.

## 2021-07-15 ENCOUNTER — OFFICE VISIT (OUTPATIENT)
Dept: FAMILY MEDICINE CLINIC | Age: 41
End: 2021-07-15

## 2021-07-15 DIAGNOSIS — Z71.89 COUNSELING AND COORDINATION OF CARE: Primary | ICD-10-CM

## 2021-07-15 PROCEDURE — 99080 SPECIAL REPORTS OR FORMS: CPT | Performed by: NURSE PRACTITIONER

## 2021-07-26 ENCOUNTER — TELEPHONE (OUTPATIENT)
Dept: FAMILY PLANNING/WOMEN'S HEALTH CLINIC | Age: 41
End: 2021-07-26

## 2021-07-26 ENCOUNTER — DOCUMENTATION ONLY (OUTPATIENT)
Dept: FAMILY MEDICINE CLINIC | Age: 41
End: 2021-07-26

## 2021-07-26 NOTE — PROGRESS NOTES
Patient is receiving continuing care from her [facility] specialist. Her completed financial screening for enrollment renewal was emailed earlier today to [facility]. Patient to call the [facility] appointment line, [phone], on/after 07-29-21 to confirm her enrollment.  Armando Mahajan RN

## 2021-07-26 NOTE — TELEPHONE ENCOUNTER
Nurse called pt in Re: rescheduled for EWL visit and mammogram.   Appt. Rescheduled patient agrees with date, time and place of appt.  Javier Morrison RN

## 2021-07-28 ENCOUNTER — VIRTUAL VISIT (OUTPATIENT)
Dept: FAMILY MEDICINE CLINIC | Age: 41
End: 2021-07-28

## 2021-07-28 DIAGNOSIS — Z71.89 COUNSELING AND COORDINATION OF CARE: Primary | ICD-10-CM

## 2021-07-28 NOTE — PROGRESS NOTES
Per AN's request, patient has been asked for other information.  OW called patient and information has been sent to [facility]

## 2021-08-12 ENCOUNTER — HOSPITAL ENCOUNTER (OUTPATIENT)
Dept: LAB | Age: 41
Discharge: HOME | End: 2021-08-12

## 2021-08-12 ENCOUNTER — OFFICE VISIT (OUTPATIENT)
Dept: FAMILY MEDICINE CLINIC | Age: 41
End: 2021-08-12

## 2021-08-12 VITALS
DIASTOLIC BLOOD PRESSURE: 84 MMHG | BODY MASS INDEX: 38.75 KG/M2 | OXYGEN SATURATION: 97 % | SYSTOLIC BLOOD PRESSURE: 123 MMHG | TEMPERATURE: 98.1 F | WEIGHT: 184.6 LBS | HEIGHT: 58 IN | HEART RATE: 80 BPM

## 2021-08-12 DIAGNOSIS — E03.9 HYPOTHYROIDISM, UNSPECIFIED TYPE: ICD-10-CM

## 2021-08-12 DIAGNOSIS — E11.65 TYPE 2 DIABETES MELLITUS WITH HYPERGLYCEMIA, WITHOUT LONG-TERM CURRENT USE OF INSULIN (HCC): ICD-10-CM

## 2021-08-12 DIAGNOSIS — R21 RASH: ICD-10-CM

## 2021-08-12 DIAGNOSIS — M25.522 LEFT ELBOW PAIN: ICD-10-CM

## 2021-08-12 DIAGNOSIS — E11.65 TYPE 2 DIABETES MELLITUS WITH HYPERGLYCEMIA, WITHOUT LONG-TERM CURRENT USE OF INSULIN (HCC): Primary | ICD-10-CM

## 2021-08-12 LAB — GLUCOSE POC: NORMAL MG/DL

## 2021-08-12 PROCEDURE — 99214 OFFICE O/P EST MOD 30 MIN: CPT | Performed by: FAMILY MEDICINE

## 2021-08-12 PROCEDURE — 85027 COMPLETE CBC AUTOMATED: CPT

## 2021-08-12 PROCEDURE — 82962 GLUCOSE BLOOD TEST: CPT | Performed by: FAMILY MEDICINE

## 2021-08-12 PROCEDURE — 80053 COMPREHEN METABOLIC PANEL: CPT

## 2021-08-12 PROCEDURE — 84443 ASSAY THYROID STIM HORMONE: CPT

## 2021-08-12 PROCEDURE — 83036 HEMOGLOBIN GLYCOSYLATED A1C: CPT

## 2021-08-12 RX ORDER — DICLOFENAC SODIUM 10 MG/G
2 GEL TOPICAL 4 TIMES DAILY
Qty: 100 G | Refills: 3 | Status: SHIPPED | OUTPATIENT
Start: 2021-08-12 | End: 2022-01-07 | Stop reason: SDUPTHER

## 2021-08-12 NOTE — PROGRESS NOTES
Coordination of Care  1. Have you been to the ER, urgent care clinic since your last visit? Hospitalized since your last visit? No    2. Have you seen or consulted any other health care providers outside of the [address] [place] [address] since your last visit? Include any pap smears or colon screening. No    Does the patient need refills? YES    Learning Assessment Complete?  yes  Depression Screening complete in the past 12 months? yes     Results for orders placed or performed in visit on 08/12/21   AMB POC GLUCOSE BLOOD, BY GLUCOSE MONITORING DEVICE   Result Value Ref Range    Glucose POC nf-196 MG/DL

## 2021-08-12 NOTE — PROGRESS NOTES
Jodie Whatley (: 1980) is a 42 y.o. female, established patient, here for evaluation of the following chief complaint(s):  Follow Up Chronic Condition, Skin Problem (pt c/o skin rash on both arms x several years), Medication Refill (Pt is not sure is she needs more Metformin), and Medication Evaluation (pt stares is no using the insulin and the invokana medicines)       ASSESSMENT/PLAN:  1. Type 2 diabetes mellitus with hyperglycemia, without long-term current use of insulin (HCC)  Improved control by home readings on a restricted diet. Recheck labs. Consider Januvia if not controlled. -     AMB POC GLUCOSE BLOOD, BY GLUCOSE MONITORING DEVICE  -      DIABETES FOOT EXAM  -     CBC W/O DIFF; Future  -     METABOLIC PANEL, COMPREHENSIVE; Future  -     HEMOGLOBIN A1C WITH EAG; Future  -     TSH 3RD GENERATION; Future  2. Left elbow pain  Recurrent bursitis, continue with Voltaren Gel. 3. Hypothyroidism, unspecified type  Recheck  4. Rash  Suggestive of photosensitivity. Check labs and discussed diet. Consider NEWTON. Follow-up and Dispositions    · Return in about 3 months (around 2021) for follow up for F2F in 3months for check up. SUBJECTIVE:  HPI  DM:  Patient is taking Glucophage XR BID regularly. Patient stopped Invokana AM and Novolin N x 1 month because she was feeling more depressed, fatigue. Since stopping medications her sx have improved. She has severely restricted her carbohydrate intake. Checking sporadically BS AM 150s. Occasional hypoglycemic sx. Today just drank her coffee w milk. Lt arm pain:  Lt arm pain continues off/on. It improves with voltaren gel but worsens with overuse. 1.5 years ago fell forward bracing herself with her arms outstretched. Had bruising around Lt elbow at the time. Xray was normal.  No numbness or paresthesias. Rash/Itching:  Has pruritis on upper arms. Usually when she is in the strong sun.       Hx of Hypothyroidism:  Patient is not taking replacement because her labs normalized off medication. Review of Systems   Constitutional: Negative for diaphoresis, fatigue and fever. Respiratory: Negative for cough, chest tightness and shortness of breath. Cardiovascular: Negative for chest pain, palpitations and leg swelling. Neurological: Negative for syncope and light-headedness. OBJECTIVE:  Blood pressure 123/84, pulse 80, temperature 98.1 °F (36.7 °C), temperature source Temporal, height 4' 9.99\" (1.473 m), weight 184 lb 9.6 oz (83.7 kg), SpO2 97 %. Physical Exam  CONSTITUTIONAL:  Well developed. No apparent distress. PSYCHIATRIC: Oriented to time, place, person & situation. Appropriate mood and affect. NECK:  Normal inspection, normal palpation without any lymphadenopathy, masses, or thyromegaly  CARDIOVASCULAR:  Regular rate and rhythm. Normal S1, S2. No extra sounds. RESPIRATORY:  Normal effort. Normal ascultation without wheezing. VASCULAR:  Normal Carotid pulses without bruits. Normal Posterior Tibialis pulses. NEUROLOGICAL:  Filament testing of feet and toes is normal.  EXTREMITIES:  No edema. Feet without sores or calluses. SKIN: No rash. Results for orders placed or performed in visit on 08/12/21   AMB POC GLUCOSE BLOOD, BY GLUCOSE MONITORING DEVICE   Result Value Ref Range    Glucose POC nf-196 MG/DL         An electronic signature was used to authenticate this note.   -- Constantine Qureshi MD

## 2021-08-12 NOTE — PROGRESS NOTES
Check-out Note: follow up for F2F in 3months for check up   Medication eRxd   Labs ordered    Printed AVS, provided to pt and reviewed. Pt indicated understanding and had no questions. Told pt that rx's have been sent to pharmacy and they should be ready for  in approximately 2 hrs. The pt's Pharmacy was reviewed. The pt was informed the lab results would be called to her if there were any abnormal labs but she will receive letter in the mail if they are normal. Pt indicated understanding and had no questions. Fermín Barroso was the .  Cale Li RN

## 2021-08-13 LAB
ALBUMIN SERPL-MCNC: 4.2 G/DL (ref 3.5–5)
ALBUMIN/GLOB SERPL: 1.2 {RATIO} (ref 1.1–2.2)
ALP SERPL-CCNC: 103 U/L (ref 45–117)
ALT SERPL-CCNC: 45 U/L (ref 12–78)
ANION GAP SERPL CALC-SCNC: 4 MMOL/L (ref 5–15)
AST SERPL-CCNC: 22 U/L (ref 15–37)
BILIRUB SERPL-MCNC: 0.7 MG/DL (ref 0.2–1)
BUN SERPL-MCNC: 14 MG/DL (ref 6–20)
BUN/CREAT SERPL: 20 (ref 12–20)
CALCIUM SERPL-MCNC: 9.9 MG/DL (ref 8.5–10.1)
CHLORIDE SERPL-SCNC: 103 MMOL/L (ref 97–108)
CO2 SERPL-SCNC: 30 MMOL/L (ref 21–32)
CREAT SERPL-MCNC: 0.69 MG/DL (ref 0.55–1.02)
ERYTHROCYTE [DISTWIDTH] IN BLOOD BY AUTOMATED COUNT: 12.4 % (ref 11.5–14.5)
EST. AVERAGE GLUCOSE BLD GHB EST-MCNC: 252 MG/DL
GLOBULIN SER CALC-MCNC: 3.4 G/DL (ref 2–4)
GLUCOSE SERPL-MCNC: 193 MG/DL (ref 65–100)
HBA1C MFR BLD: 10.4 % (ref 4–5.6)
HCT VFR BLD AUTO: 44 % (ref 35–47)
HGB BLD-MCNC: 14.3 G/DL (ref 11.5–16)
MCH RBC QN AUTO: 28.8 PG (ref 26–34)
MCHC RBC AUTO-ENTMCNC: 32.5 G/DL (ref 30–36.5)
MCV RBC AUTO: 88.7 FL (ref 80–99)
NRBC # BLD: 0 K/UL (ref 0–0.01)
NRBC BLD-RTO: 0 PER 100 WBC
PLATELET # BLD AUTO: 277 K/UL (ref 150–400)
PMV BLD AUTO: 12.2 FL (ref 8.9–12.9)
POTASSIUM SERPL-SCNC: 4.2 MMOL/L (ref 3.5–5.1)
PROT SERPL-MCNC: 7.6 G/DL (ref 6.4–8.2)
RBC # BLD AUTO: 4.96 M/UL (ref 3.8–5.2)
SODIUM SERPL-SCNC: 137 MMOL/L (ref 136–145)
TSH SERPL DL<=0.05 MIU/L-ACNC: 4.66 UIU/ML (ref 0.36–3.74)
WBC # BLD AUTO: 6.5 K/UL (ref 3.6–11)

## 2021-08-13 RX ORDER — LEVOTHYROXINE SODIUM 50 UG/1
50 TABLET ORAL
Qty: 90 TABLET | Refills: 1 | Status: SHIPPED | OUTPATIENT
Start: 2021-08-13 | End: 2022-01-07 | Stop reason: SDUPTHER

## 2021-08-13 NOTE — PROGRESS NOTES
Diabetes is not controlled. I would recommend restarting her evening insulin at 10U before bed. I would also recommend starting Januvia oral medication once a day. Can we get through [facility]? Rx entered, on hold. Message forwarded to Daniel Hamilton. Thyroid is a little low again and so would recommend restarting a low dose of thyroid medication.   Medication eRxd

## 2021-08-16 ENCOUNTER — HOSPITAL ENCOUNTER (OUTPATIENT)
Dept: MAMMOGRAPHY | Age: 41
Discharge: HOME | End: 2021-08-16

## 2021-08-16 ENCOUNTER — TRANSCRIBE ORDER (OUTPATIENT)
Dept: MAMMOGRAPHY | Age: 41
End: 2021-08-16

## 2021-08-16 ENCOUNTER — OFFICE VISIT (OUTPATIENT)
Dept: FAMILY PLANNING/WOMEN'S HEALTH CLINIC | Age: 41
End: 2021-08-16

## 2021-08-16 DIAGNOSIS — Z12.31 VISIT FOR SCREENING MAMMOGRAM: Primary | ICD-10-CM

## 2021-08-16 DIAGNOSIS — Z12.31 VISIT FOR SCREENING MAMMOGRAM: ICD-10-CM

## 2021-08-16 DIAGNOSIS — Z12.31 SCREENING MAMMOGRAM, ENCOUNTER FOR: Primary | ICD-10-CM

## 2021-08-16 PROCEDURE — 77067 SCR MAMMO BI INCL CAD: CPT

## 2021-08-16 NOTE — PROGRESS NOTES
HISTORY OF PRESENT ILLNESS  Paula Schultz is a 42 y.o. female here for EWL. HPI Ms. Lieutenant Silver, denies abnormal SBE's, performs routinely. Her last mammogram was 12/2019. LMP 5/11/2021. She had an Essure device placed in 2013. She is being followed at the [facility] for irregular bleeding and DM. She denies pelvic pain and bloating. Her last Pap was in 2019. H/O uterine biopsy in 2/2021 that was negative. Review of Systems   Constitutional: Negative. Respiratory: Negative. Cardiovascular: Negative. Gastrointestinal: Negative. Genitourinary: Negative. Skin: Negative. Physical Exam  Nursing note reviewed. Constitutional:       Appearance: Normal appearance. Chest:      Breasts:         Right: No swelling, bleeding, inverted nipple, mass, nipple discharge, skin change or tenderness. Left: No swelling, bleeding, inverted nipple, mass, nipple discharge, skin change or tenderness. Genitourinary:     Labia:         Right: No rash, tenderness or lesion. Left: No rash, tenderness or lesion. Urethra: No urethral pain, urethral swelling or urethral lesion. Vagina: Normal.      Cervix: Normal.      Uterus: Normal.       Adnexa:         Right: No tenderness or fullness. Left: No tenderness or fullness. Comments: Bimanual pelvic exam limited d/t body habitus. Lymphadenopathy:      Upper Body:      Right upper body: No supraclavicular, axillary or pectoral adenopathy. Left upper body: No supraclavicular, axillary or pectoral adenopathy. Lower Body: No right inguinal adenopathy. No left inguinal adenopathy. Skin:     General: Skin is warm and dry. Neurological:      Mental Status: She is alert and oriented to person, place, and time. Psychiatric:         Mood and Affect: Mood normal.         Behavior: Behavior normal.         Thought Content: Thought content normal.         Judgment: Judgment normal.         ASSESSMENT and PLAN  1. EWL  2.  CBE benign  3.  Screening mammogram today

## 2021-08-16 NOTE — PROGRESS NOTES
EVERY WOMANS LIFE HISTORY QUESTIONNAIRE       No Yes Comments   Has a doctor ever seen or felt anything wrong with your breast? [x]                                  []                                     Have you ever had a breast biopsy? [x]                                  []                                          When and where was last mammogram performed? 12/16/2019 [facility]    Have you ever been told that there was a problem on your mammogram?   No Yes Comments   [x]                                  []                                       Do you have breast implants? No Yes Comments   [x]                                  []                                       When was your last Pap test performed? 12/16/2019 [facility]    Have you ever had an abnormal Pap test?   No Yes Comments   [x]                                  []                                       Have you had a hysterectomy? No Yes Comments (why)   [x]                                  []                                       Have you been through menopause? No Yes Date of LMP   [x]                                  []                                  05/11/2021 Has Essure -permanent birth control placed     Did your mother take HOWARD? No Yes Unknown   []                                  []                                  x     Do you have a history of HIV exposure? No Yes    [x]                                  []                                       Have you ever been diagnosed with any type of Cancer   No Yes Comments (type,when,where,type of treatment   [x]                                  []                                          Has a family member been diagnosed with breast or ovarian cancer?    No Yes Comments (which family members, and type   []                                  [x]                                  Paternal Aunt dx with breast CA at age 54- alive     Are you taking hormone replacement therapy (HRT)     No Yes Comments   [x] []                                       How many times have you been pregnant? 3        Number of live births ? 3    Are you experiencing any of the following? No Yes Comments   Nipple Discharge [x]                                  []                                     Breast Lump/Masses [x]                                  []                                     Breast Skin Changes [x]                                  []                                          No Yes Comments   Vaginal Discharge [x]                                  []                                     Abnormal/unusual vaginal bleeding []                                  [x]                                  Having recent irregular periods, has permanent birth control \"Essure\", did a pregnancy test (at home) last week negative       Are you experiencing any other health problems? Diabetic, irregular periods- [facility] patient      Age at first period?  13  Age at first birth? 21    Ht-- 5 feet    Wt-- 186 lbs

## 2021-08-18 ENCOUNTER — TELEPHONE (OUTPATIENT)
Dept: FAMILY MEDICINE CLINIC | Age: 41
End: 2021-08-18

## 2021-08-18 NOTE — PROGRESS NOTES
With Nette Wilson as , patient called today. I reviewed all of provider's message and she understands to restart her Insulin at 10 unit before HS and to go get thyroid medication to restart as well. I have explained to patient her need for Januvia and that [employer] will schedule her for appointment to do the [place] RX application. Patient is not interested in PAP program at this time.  Jose Dunn RN

## 2021-08-19 ENCOUNTER — VIRTUAL VISIT (OUTPATIENT)
Dept: FAMILY MEDICINE CLINIC | Age: 41
End: 2021-08-19

## 2021-08-19 DIAGNOSIS — Z71.89 COUNSELING AND COORDINATION OF CARE: Primary | ICD-10-CM

## 2021-08-24 ENCOUNTER — OFFICE VISIT (OUTPATIENT)
Dept: FAMILY MEDICINE CLINIC | Age: 41
End: 2021-08-24

## 2021-08-24 DIAGNOSIS — Z71.89 COUNSELING AND COORDINATION OF CARE: Primary | ICD-10-CM

## 2021-08-24 PROCEDURE — 99080 SPECIAL REPORTS OR FORMS: CPT | Performed by: NURSE PRACTITIONER

## 2021-08-24 NOTE — PROGRESS NOTES
Financial screening for [employer] application has been completed. A message will be sent to . Completed financial screeing Qwill be sent via fax to [facility]. Patient will  at the [address] [place] [address].

## 2021-08-24 NOTE — TELEPHONE ENCOUNTER
----- Message from Davida Valencia MD sent at 8/13/2021  8:43 AM EDT -----  Diabetes is not controlled. I would recommend restarting her evening insulin at 10U before bed. I would also recommend starting Januvia oral medication once a day. Can we get through [employer]? Rx entered, on hold. Message forwarded to [facility]. Thyroid is a little low again and so would recommend restarting a low dose of thyroid medication.   Medication eRxd
This is a new [place] pharmacy referral for patient's Ana Laura. With Kristen Morrison as , I called patient and she would like to pursue CO and not PAP program for her Januvia. I explained to patient that [employer] will call her to schedule this appointment to complete application.  Alex Soto RN
This patient has completed the [facility] financial screening paperwork and is now eligible through 8/24/22. I have reordered the prescribed medications and sent them to the [facility] pharmacy. I have also sent an email to Shaneka Schreiber at the [facility] pharmacy to let her know patient's name, date of birth, eligibility dates and medications requested. I have also told Jessica Sanchez that patient prefers the [facility] location to  medication(s). I have called patient with Margette Hodgkin as , and she knows to wait for automated phone call from their clinic before picking up medication.  Reubin Cowden, RN
clear

## 2021-09-06 NOTE — Clinical Note
Pablo Silver, please give her resources for food pantries, they are out of food. If needed, you can get social workers to help! Faith, please schedule her with Michel Tobar for 3 months. Thank you! Ray Cesar, refilling metformin, no other changes to meds today.  She has insulin at home [place]

## 2021-09-27 DIAGNOSIS — E11.65 TYPE 2 DIABETES MELLITUS WITH HYPERGLYCEMIA, WITH LONG-TERM CURRENT USE OF INSULIN (HCC): ICD-10-CM

## 2021-09-27 DIAGNOSIS — Z79.4 TYPE 2 DIABETES MELLITUS WITH HYPERGLYCEMIA, WITH LONG-TERM CURRENT USE OF INSULIN (HCC): ICD-10-CM

## 2021-09-27 NOTE — PROGRESS NOTES
TPC medication refill authorization approved for:   Insulin NPH use 24 units qhs sq dosing  Routing to PAP coordinator to process the refill request, thanks

## 2021-10-11 ENCOUNTER — DOCUMENTATION ONLY (OUTPATIENT)
Dept: FAMILY MEDICINE CLINIC | Age: 41
End: 2021-10-11

## 2021-10-11 NOTE — PROGRESS NOTES
Patient's refill request for Humulin N vials was faxed last week to PAP with a note asking that her order be shipped to the [address] [place] [address] office.  Pam Westbrook RN

## 2021-10-20 ENCOUNTER — DOCUMENTATION ONLY (OUTPATIENT)
Dept: FAMILY MEDICINE CLINIC | Age: 41
End: 2021-10-20

## 2021-10-20 NOTE — PROGRESS NOTES
Receipt of patient's PAP order of 5 vials of Humulin N, 100u/ml, 10ml/vial, has been entered into the log book. Routing to Dr. Nancy Schwarz for dose confirmation and permission to deliver to the patient.  Felicia Norton RN      insulin NPH (NOVOLIN N, HUMULIN N) 100 unit/mL injection [[id number]]     Order Details  Dose, Route, Frequency: As Directed   Dispense Quantity: 30 mL Refills: 3    Note to Pharmacy: Not sent to pharmacy; for TPC.         Sig: Sig: use 24 units qhs; max daily dose 35 units for TPC         Start Date: 09/27/21 End Date: --   Written Date: 09/27/21 Expiration Date: --       Diagnosis Association: Type 2 diabetes mellitus with hyperglycemia, with long-term current use of insulin (HCC) (E11.65 , Z79.4)   Original Order:  insulin NPH (NOVOLIN N, HUMULIN N) 100 unit/mL injection [[id number]]   Providers    Authorizing Provider: SOULEYMANE Martin NPI: [id number]   PRESTON #: [id number]

## 2021-11-13 NOTE — PROGRESS NOTES
11/11/21: T/C made to the patient with assistance from [facility]  #[id number] to schedule delivery of her PAP order of Humulin N vials. There was no answer and I did not leave a message.  Teddy Villa RN

## 2021-11-18 NOTE — PROGRESS NOTES
T/C made to the patient with assistance from [facility]  #[id number] to schedule delivery of her PAP order of Humulin N vials. There was no answer so a generic message was left asking for a return call to nurse Duarte at the [address] [place] [address], [phone], about her medication.  Nathaniel Wei RN

## 2021-12-01 ENCOUNTER — TELEPHONE (OUTPATIENT)
Dept: FAMILY MEDICINE CLINIC | Age: 41
End: 2021-12-01

## 2021-12-01 NOTE — TELEPHONE ENCOUNTER
11-30-21: Another attempt made to reach patient to schedule delivery of her PAP order of Humulin N vials. T/C made to patient with assistance from [facility] Esau Saavedra  #[id number]. There was no answer so a message was left asking for a return call to nurse Duarte at the [address] [place] [address], [phone], about her medication.  Harris Howell RN

## 2021-12-16 ENCOUNTER — DOCUMENTATION ONLY (OUTPATIENT)
Dept: FAMILY MEDICINE CLINIC | Age: 41
End: 2021-12-16

## 2021-12-16 NOTE — PROGRESS NOTES
12-13-21: T/C made to the patient with assistance from [address] [place] [address] [employer]  #[id number] to schedule delivery of her PAP order of Humulin N vials. There was no answer so a message was left asking for a return call the nurse Felicia at the [address] [place] [address], [phone], about her medication order. 12-14-21: T/C made to the patient with assistance from [facility] [employer]  #[id number] to schedule delivery of her PAP order of Humulin N vials. The patient agreed to a delivery at her home on 12-15-21 at 4pm and confirmed her address as the one listed in [address] [place] [address]. We reviewed the provider's dose instructions and a reminder to please call me at [phone] in three months to request a refill of her insulin through PAP. The patient expressed understanding. 12-15-21: Patient's PAP order of Humulin N vials was delivered to her at her home as planned by Cadence Bowles. Due to Covid-19 precautions, the PAP med pick-up slip was not signed.  Imani Duong RN

## 2022-01-07 ENCOUNTER — HOSPITAL ENCOUNTER (OUTPATIENT)
Dept: LAB | Age: 42
Discharge: HOME | End: 2022-01-07

## 2022-01-07 ENCOUNTER — OFFICE VISIT (OUTPATIENT)
Dept: FAMILY MEDICINE CLINIC | Age: 42
End: 2022-01-07

## 2022-01-07 VITALS
DIASTOLIC BLOOD PRESSURE: 84 MMHG | HEART RATE: 85 BPM | TEMPERATURE: 98 F | OXYGEN SATURATION: 97 % | WEIGHT: 181.6 LBS | SYSTOLIC BLOOD PRESSURE: 116 MMHG | BODY MASS INDEX: 37.96 KG/M2

## 2022-01-07 DIAGNOSIS — Z79.4 TYPE 2 DIABETES MELLITUS WITH HYPERGLYCEMIA, WITH LONG-TERM CURRENT USE OF INSULIN (HCC): Primary | ICD-10-CM

## 2022-01-07 DIAGNOSIS — E03.9 HYPOTHYROIDISM, UNSPECIFIED TYPE: ICD-10-CM

## 2022-01-07 DIAGNOSIS — M25.522 LEFT ELBOW PAIN: ICD-10-CM

## 2022-01-07 DIAGNOSIS — Z23 NEEDS FLU SHOT: ICD-10-CM

## 2022-01-07 DIAGNOSIS — Z79.4 TYPE 2 DIABETES MELLITUS WITH HYPERGLYCEMIA, WITH LONG-TERM CURRENT USE OF INSULIN (HCC): ICD-10-CM

## 2022-01-07 DIAGNOSIS — E11.65 TYPE 2 DIABETES MELLITUS WITH HYPERGLYCEMIA, WITH LONG-TERM CURRENT USE OF INSULIN (HCC): ICD-10-CM

## 2022-01-07 DIAGNOSIS — E11.65 TYPE 2 DIABETES MELLITUS WITH HYPERGLYCEMIA, WITH LONG-TERM CURRENT USE OF INSULIN (HCC): Primary | ICD-10-CM

## 2022-01-07 LAB — GLUCOSE POC: NORMAL MG/DL

## 2022-01-07 PROCEDURE — 99214 OFFICE O/P EST MOD 30 MIN: CPT | Performed by: FAMILY MEDICINE

## 2022-01-07 PROCEDURE — 82043 UR ALBUMIN QUANTITATIVE: CPT

## 2022-01-07 PROCEDURE — 80061 LIPID PANEL: CPT

## 2022-01-07 PROCEDURE — 3052F HG A1C>EQUAL 8.0%<EQUAL 9.0%: CPT | Performed by: FAMILY MEDICINE

## 2022-01-07 PROCEDURE — 82962 GLUCOSE BLOOD TEST: CPT | Performed by: FAMILY MEDICINE

## 2022-01-07 PROCEDURE — 84439 ASSAY OF FREE THYROXINE: CPT

## 2022-01-07 PROCEDURE — 83036 HEMOGLOBIN GLYCOSYLATED A1C: CPT

## 2022-01-07 PROCEDURE — 85027 COMPLETE CBC AUTOMATED: CPT

## 2022-01-07 PROCEDURE — 80053 COMPREHEN METABOLIC PANEL: CPT

## 2022-01-07 PROCEDURE — 84443 ASSAY THYROID STIM HORMONE: CPT

## 2022-01-07 RX ORDER — LEVOTHYROXINE SODIUM 50 UG/1
50 TABLET ORAL
Qty: 90 TABLET | Refills: 1 | Status: SHIPPED | OUTPATIENT
Start: 2022-01-07 | End: 2022-06-20 | Stop reason: SDUPTHER

## 2022-01-07 RX ORDER — DICLOFENAC SODIUM 10 MG/G
2 GEL TOPICAL 4 TIMES DAILY
Qty: 100 G | Refills: 3 | Status: SHIPPED | OUTPATIENT
Start: 2022-01-07 | End: 2022-06-20 | Stop reason: ALTCHOICE

## 2022-01-07 RX ORDER — LEVOTHYROXINE SODIUM 50 UG/1
50 TABLET ORAL
Qty: 90 TABLET | Refills: 1 | Status: SHIPPED | OUTPATIENT
Start: 2022-01-07 | End: 2022-01-07 | Stop reason: SDUPTHER

## 2022-01-07 NOTE — PROGRESS NOTES
Sybil Terry (: 1980) is a 42 y.o. female, established patient, here for evaluation of the following chief complaint(s):  Hypertension (Diabetes F/U), Medication Refill (Levothyroxine, Januvia, and Diclofenac Topical Gel), and Immunization/Injection (Flu Vaccine)       ASSESSMENT/PLAN:  1. Type 2 diabetes mellitus with hyperglycemia, with long-term current use of insulin (HCC)  Improved control, continue current regimen. Reviewed s/sx of hypoglycemia and recommended decreasing NPH if these occur.  -     AMB POC GLUCOSE BLOOD, BY GLUCOSE MONITORING DEVICE  -     CBC W/O DIFF; Future  -     HEMOGLOBIN A1C WITH EAG; Future  -     LIPID PANEL; Future  -     METABOLIC PANEL, COMPREHENSIVE; Future  -     MICROALBUMIN, UR, RAND W/ MICROALB/CREAT RATIO; Future  2. Hypothyroidism, unspecified type  Continue with current dose.  -     TSH 3RD GENERATION; Future  -     T4, FREE; Future  3. Left elbow pain  Controlled with Voltaren Gel PRN. 4. Needs flu shot  -     INFLUENZA VIRUS VAC QUAD,SPLIT,PRESV FREE SYRINGE IM      Follow-up and Dispositions    · Return for follow up for F2F in 3 months for DM check. .         SUBJECTIVE:  HPI  DM:  Patient is taking Januvia, Glucophage XR BID, NPH 20U QHS regularly. Invokana caused fatigue, depression. Checking sporadically BS AM 120s. Denies hypoglycemic spells. Breakfast: egg, occasionally hash browns, sausage. Lunch: largest meal, rice, chicken, salad. Dinner: rice, salad, chicken. Hypothyroidism:  Patient restarted Synthroid. Has lost a few pounds since last visit. Lt arm pain:  Lt arm pain continues off/on. It improves with voltaren gel but worsens with overuse. 1.5 years ago fell forward bracing herself with her arms outstretched. Had bruising around Lt elbow at the time. Xray was normal.  No numbness or paresthesias. Review of Systems   Constitutional: Negative for diaphoresis, fatigue, fever and unexpected weight change.    Respiratory: Negative for cough, chest tightness and shortness of breath. Cardiovascular: Negative for chest pain, palpitations and leg swelling. Endocrine: Negative for polydipsia and polyuria. Neurological: Negative for syncope, light-headedness and numbness. OBJECTIVE:  Blood pressure 116/84, pulse 85, temperature 98 °F (36.7 °C), temperature source Temporal, weight 181 lb 9.6 oz (82.4 kg), last menstrual period 05/11/2021, SpO2 97 %. Physical Exam  CONSTITUTIONAL:  Well developed. No apparent distress. PSYCHIATRIC: Oriented to time, place, person & situation. Appropriate mood and affect. NECK:  Normal inspection, normal palpation without any lymphadenopathy, masses, or thyromegaly  CARDIOVASCULAR:  Regular rate and rhythm. Normal S1, S2. No extra sounds. RESPIRATORY:  Normal effort. Normal ascultation without wheezing. VASCULAR:  Normal Carotid pulses without bruits. Normal Posterior Tibialis pulses. EXTREMITIES:  No edema. Results for orders placed or performed in visit on 01/07/22   AMB POC GLUCOSE BLOOD, BY GLUCOSE MONITORING DEVICE   Result Value Ref Range    Glucose -NF MG/DL         An electronic signature was used to authenticate this note.   -- Tanna Rogel MD

## 2022-01-07 NOTE — PROGRESS NOTES
1. Have you been to the ER, urgent care clinic since your last visit? Hospitalized since your last visit? No    2. Have you seen or consulted any other health care providers outside of the [address] [place] [address] since your last visit? Include any pap smears or colon screening.  No

## 2022-01-07 NOTE — PROGRESS NOTES
I have printed AVS and reviewed it with patient today. I have copied the provider's check out note here and have reviewed these items with the patient today:Check-out Note: follow up for F2F in 3 months for DM check. Make VV for 1 week to review labs. Labs ordered   Medications eRxd     I reviewed the patients discharge information with her. The patient was able to correctly confirm her name and date of birth prior to the information shared.  #[id number] with [facility] services assisted. I reviewed the patient's medications with her and she verbalized understanding. I instructed the patient to make her follow up appointment prior to leaving. Patient verbalized understanding.  Rodolfo Gallego RN

## 2022-01-08 LAB
ALBUMIN SERPL-MCNC: 4.3 G/DL (ref 3.5–5)
ALBUMIN/GLOB SERPL: 1.2 {RATIO} (ref 1.1–2.2)
ALP SERPL-CCNC: 106 U/L (ref 45–117)
ALT SERPL-CCNC: 32 U/L (ref 12–78)
ANION GAP SERPL CALC-SCNC: 7 MMOL/L (ref 5–15)
AST SERPL-CCNC: 19 U/L (ref 15–37)
BILIRUB SERPL-MCNC: 0.6 MG/DL (ref 0.2–1)
BUN SERPL-MCNC: 15 MG/DL (ref 6–20)
BUN/CREAT SERPL: 23 (ref 12–20)
CALCIUM SERPL-MCNC: 10 MG/DL (ref 8.5–10.1)
CHLORIDE SERPL-SCNC: 103 MMOL/L (ref 97–108)
CHOLEST SERPL-MCNC: 197 MG/DL
CO2 SERPL-SCNC: 26 MMOL/L (ref 21–32)
CREAT SERPL-MCNC: 0.65 MG/DL (ref 0.55–1.02)
CREAT UR-MCNC: 27.8 MG/DL
ERYTHROCYTE [DISTWIDTH] IN BLOOD BY AUTOMATED COUNT: 12.1 % (ref 11.5–14.5)
EST. AVERAGE GLUCOSE BLD GHB EST-MCNC: 200 MG/DL
GLOBULIN SER CALC-MCNC: 3.7 G/DL (ref 2–4)
GLUCOSE SERPL-MCNC: 115 MG/DL (ref 65–100)
HBA1C MFR BLD: 8.6 % (ref 4–5.6)
HCT VFR BLD AUTO: 42.5 % (ref 35–47)
HDLC SERPL-MCNC: 53 MG/DL
HDLC SERPL: 3.7 {RATIO} (ref 0–5)
HGB BLD-MCNC: 13.8 G/DL (ref 11.5–16)
LDLC SERPL CALC-MCNC: 80.4 MG/DL (ref 0–100)
MCH RBC QN AUTO: 28.8 PG (ref 26–34)
MCHC RBC AUTO-ENTMCNC: 32.5 G/DL (ref 30–36.5)
MCV RBC AUTO: 88.7 FL (ref 80–99)
MICROALBUMIN UR-MCNC: <0.5 MG/DL
MICROALBUMIN/CREAT UR-RTO: <18 MG/G (ref 0–30)
NRBC # BLD: 0 K/UL (ref 0–0.01)
NRBC BLD-RTO: 0 PER 100 WBC
PLATELET # BLD AUTO: 297 K/UL (ref 150–400)
PMV BLD AUTO: 12.1 FL (ref 8.9–12.9)
POTASSIUM SERPL-SCNC: 3.9 MMOL/L (ref 3.5–5.1)
PROT SERPL-MCNC: 8 G/DL (ref 6.4–8.2)
RBC # BLD AUTO: 4.79 M/UL (ref 3.8–5.2)
SODIUM SERPL-SCNC: 136 MMOL/L (ref 136–145)
T4 FREE SERPL-MCNC: 1.1 NG/DL (ref 0.8–1.5)
TRIGL SERPL-MCNC: 318 MG/DL (ref ?–150)
TSH SERPL DL<=0.05 MIU/L-ACNC: 2.06 UIU/ML (ref 0.36–3.74)
VLDLC SERPL CALC-MCNC: 63.6 MG/DL
WBC # BLD AUTO: 8.4 K/UL (ref 3.6–11)

## 2022-01-11 NOTE — PROGRESS NOTES
Called and reviewed labs with patient. DM better but not at goal.  Change Insulin to AM since her largest meals are lunch and dinner.

## 2022-01-17 ENCOUNTER — VIRTUAL VISIT (OUTPATIENT)
Dept: FAMILY MEDICINE CLINIC | Age: 42
End: 2022-01-17

## 2022-01-17 NOTE — PROGRESS NOTES
Patient requested to cancel todays appointment. Patient stated she has to work and already spoke to Dr Nelson Simpson about her lab results.

## 2022-02-10 ENCOUNTER — DOCUMENTATION ONLY (OUTPATIENT)
Dept: FAMILY MEDICINE CLINIC | Age: 42
End: 2022-02-10

## 2022-02-10 NOTE — PROGRESS NOTES
02-: Receipt of patient's PAP order of 5 vials of Humulin N, 100u/ml, 10ml/vial , has been entered into the log book. Routing to Dr. Nadeem Flores for dose confirmation and permission to deliver to the patient.  Felicia Norton RN    insulin NPH (NOVOLIN N, HUMULIN N) 100 unit/mL injection [[id number]]     Order Details  Dose, Route, Frequency: As Directed   Dispense Quantity: 30 mL Refills: 3    Note to Pharmacy: Not sent to pharmacy; for TPC.         Sig: Sig: use 24 units qhs; max daily dose 35 units for TPC         Start Date: 09/27/21 End Date: --   Written Date: 09/27/21 Expiration Date: --       Diagnosis Association: Type 2 diabetes mellitus with hyperglycemia, with long-term current use of insulin (HCC) (E11.65 , Z79.4)   Original Order:  insulin NPH (NOVOLIN N, HUMULIN N) 100 unit/mL injection [[id number]]     Providers    Authorizing Provider:    Bhakti Jacobo, [address] [place] [address]   Phone:  [phone]   Fax:  [phone]   PRESTON #:  [id number]   NPI:  [id number]

## 2022-03-07 NOTE — PROGRESS NOTES
02-23-22: T/C made to the patient with assistance from [facility]  #[id number] to schedule delivery of her PAP order of Humulin N vials. The patient agreed to a delivery at her home on 02-24-22 after 3 pm and confirmed her address as the one listed in CC. We reviewed the provider's dose instructions and a reminder to please call me at [phone] in three months to request a refill of her Humulin N through PAP. The patient expressed understanding and had no questions. 02-24-22: Patient's PAP order of Humulin N vials was delivered to her at her home as planned by Milton Carmona. Due to Covid-19 precautions, the PAP med pick-up slip was not signed.  Moustapha De La Garza RN

## 2022-03-19 PROBLEM — E66.01 SEVERE OBESITY (BMI 35.0-39.9) WITH COMORBIDITY (HCC): Status: ACTIVE | Noted: 2018-05-22

## 2022-03-19 PROBLEM — Z98.890 H/O CARPAL TUNNEL REPAIR: Status: ACTIVE | Noted: 2017-09-28

## 2022-03-19 PROBLEM — E11.65 TYPE 2 DIABETES MELLITUS WITH HYPERGLYCEMIA, WITHOUT LONG-TERM CURRENT USE OF INSULIN (HCC): Status: ACTIVE | Noted: 2017-08-06

## 2022-03-20 PROBLEM — M25.562 LEFT ANTERIOR KNEE PAIN: Status: ACTIVE | Noted: 2018-03-16

## 2022-06-20 ENCOUNTER — VIRTUAL VISIT (OUTPATIENT)
Dept: FAMILY MEDICINE CLINIC | Age: 42
End: 2022-06-20

## 2022-06-20 DIAGNOSIS — E11.65 TYPE 2 DIABETES MELLITUS WITH HYPERGLYCEMIA, WITH LONG-TERM CURRENT USE OF INSULIN (HCC): ICD-10-CM

## 2022-06-20 DIAGNOSIS — E03.9 HYPOTHYROIDISM, UNSPECIFIED TYPE: ICD-10-CM

## 2022-06-20 DIAGNOSIS — Z79.4 TYPE 2 DIABETES MELLITUS WITH HYPERGLYCEMIA, WITH LONG-TERM CURRENT USE OF INSULIN (HCC): ICD-10-CM

## 2022-06-20 DIAGNOSIS — M54.30 SCIATICA, UNSPECIFIED LATERALITY: Primary | ICD-10-CM

## 2022-06-20 DIAGNOSIS — M79.643 PAIN OF HAND, UNSPECIFIED LATERALITY: ICD-10-CM

## 2022-06-20 PROBLEM — J30.9 ALLERGIC RHINITIS: Status: ACTIVE | Noted: 2022-06-20

## 2022-06-20 PROBLEM — M75.102 ROTATOR CUFF SYNDROME OF LEFT SHOULDER: Status: ACTIVE | Noted: 2022-06-20

## 2022-06-20 PROCEDURE — 99443 PR PHYS/QHP TELEPHONE EVALUATION 21-30 MIN: CPT | Performed by: NURSE PRACTITIONER

## 2022-06-20 RX ORDER — GLIPIZIDE 5 MG/1
5 TABLET ORAL
Qty: 90 TABLET | Refills: 0 | Status: SHIPPED | OUTPATIENT
Start: 2022-06-20 | End: 2022-09-15

## 2022-06-20 RX ORDER — AMITRIPTYLINE HYDROCHLORIDE 10 MG/1
10 TABLET, FILM COATED ORAL
Qty: 90 TABLET | Refills: 0 | Status: SHIPPED | OUTPATIENT
Start: 2022-06-20 | End: 2022-09-15

## 2022-06-20 RX ORDER — NAPROXEN 500 MG/1
500 TABLET ORAL 2 TIMES DAILY WITH MEALS
Qty: 60 TABLET | Refills: 0 | Status: SHIPPED | OUTPATIENT
Start: 2022-06-20

## 2022-06-20 RX ORDER — METFORMIN HYDROCHLORIDE 500 MG/1
TABLET, EXTENDED RELEASE ORAL
Qty: 360 TABLET | Refills: 3 | Status: SHIPPED | OUTPATIENT
Start: 2022-06-20

## 2022-06-20 RX ORDER — LEVOTHYROXINE SODIUM 50 UG/1
50 TABLET ORAL
Qty: 90 TABLET | Refills: 1 | Status: SHIPPED | OUTPATIENT
Start: 2022-06-20

## 2022-06-20 RX ORDER — GLIPIZIDE 5 MG/1
5 TABLET ORAL
Qty: 90 TABLET | Refills: 0 | Status: SHIPPED | OUTPATIENT
Start: 2022-06-20 | End: 2022-06-20 | Stop reason: SDUPTHER

## 2022-06-20 RX ORDER — AMITRIPTYLINE HYDROCHLORIDE 10 MG/1
10 TABLET, FILM COATED ORAL
Qty: 90 TABLET | Refills: 0 | Status: SHIPPED | OUTPATIENT
Start: 2022-06-20 | End: 2022-06-20 | Stop reason: SDUPTHER

## 2022-06-20 NOTE — PROGRESS NOTES
Tc to the pt for intake with Husam Coleman. The pt verified her name and . The pt stated she did not receive the Diclofenac gel th ePharmacy said they did not get the rx. The pt was told they sell it otc as well. The pt stated she stopped her insulin May 2022, 3 weeks,  her insulin 24 units because her hands fall asleep. The pt stated she needs the Januvia she does not have this. She has not gone to pick it up. Coordination of Care  1. Have you been to the ER, urgent care clinic since your last visit? Hospitalized since your last visit? No    2. Have you seen or consulted any other health care providers outside of the [address] [place] [address] since your last visit? Include any pap smears or colon screening. No    Does the patient need refills?  YES    Learning Assessment Complete? no  Depression Screening complete in the past 12 months? yes

## 2022-06-20 NOTE — Clinical Note
Felicia, she was previously taking Januvia and getting it from [facility]. She needs refills please. Please let me know what orders I may need to add.   Thank you, Lance Ortiz

## 2022-06-20 NOTE — PROGRESS NOTES
: Zulay Rogers  Patient identification verified with 2 identifiers. Consent: She and/or health care decision maker has provided verbal consent to proceed: Yes   Total Time: minutes: 21-30 minutes  Pursuant to the emergency declaration under the [facility] authority and the [employer] Act, this Virtual Telephone Visit was conducted to reduce the patient's risk of exposure to COVID-19. Assessment/Plan:   Diagnoses and all orders for this visit:    1. Sciatica, unspecified laterality  -     naproxen (NAPROSYN) 500 mg tablet; Take 1 Tablet by mouth two (2) times daily (with meals). For pain. Gina 1 tab por boca 2 veces al alfie con comida por dolor. 2. Pain of hand, unspecified laterality  -     amitriptyline (ELAVIL) 10 mg tablet; Take 1 Tablet by mouth nightly. For hand pain. Gina 1 tab en la noche para dolor de las deysi. 3. Type 2 diabetes mellitus with hyperglycemia, with long-term current use of insulin (HCC)  -     glipiZIDE (GLUCOTROL) 5 mg tablet; Take 1 Tablet by mouth Daily (before dinner). 30 minutes before dinner for diabetes. Gina 1 tab 30 min antes de comer la hong para diabetes. -     metFORMIN ER (GLUCOPHAGE XR) 500 mg tablet; TAKE 4 TABLETS BY MOUTH ONCE DAILY WITH  DINNER  -     HEMOGLOBIN A1C WITH EAG; Future    4. Hypothyroidism, unspecified type  -     levothyroxine (SYNTHROID) 50 mcg tablet; Take 1 Tablet by mouth Daily (before breakfast). return F2F 2 months. Same day labs. Follow-up and Dispositions    · Return for F2F LK nonfasting labs same day, 2 months. Routing History     -Hasn't taken the insulin in 3 weeks. Not on Januvia at this time. Will add glipizide 5 mg before dinner, naproxen and amitriptyline for pain, continue metformin and levothyroxine, and inquire about refilling Januvia.       Subjective:   Phuong Parra is a 36 y.o. female evaluated via telephone on 6/20/2022. Chief Complaint   Patient presents with    Arm Pain     right arm, and shoulder, and wrist pain, for 1 month. comes and goes not constant.  Leg Pain     starts at waist and goes down the leg in the back, left leg, for 3 months    Medication Refill      History of Present Illness  The pt stated she did not receive the Diclofenac gel th ePharmacy said they did not get the rx. The pt was told they sell it otc as well. The pt stated she stopped her insulin May 2022, 3 weeks,  her insulin 24 units because her hands fall asleep. The pt stated she needs the Januvia she does not have this. She has not gone to pick it up. Arm and Leg Pain  Leg - more in the evenings. Worse after working and it's gotten worse in the last 3-4 weeks. She does have the insulin. Arm - any time. Measured 144 once when she was shaking. This was on Saturday at 10 am.  She was fasting. She felt better after eating. She is able to sleep. She can do her normal activities. It's when she stops moving that she is unable to walk. She uses a cream to help pain of arm and leg, like manuel wallace. Review of Systems Positive for leg pain, hand/arm pain  General: No fever. Eyes: No blurry or double vision. Cardiovascular: No chest pain, dyspnea, or TIAs. Endocrine: No polydipsia or polyphagia. No weight loss. No hypoglycemic symptoms. Respiratory: No shortness of breath. Musculoskeletal: No myalgias. Genitourinary: No polyuria. Extremities: No swelling.     Objective:     Latest Lab Result Choices discussed today:   Lab Results   Component Value Date/Time    Hemoglobin A1c 8.6 (H) 01/07/2022 03:43 PM    Hemoglobin A1c 10.4 (H) 08/12/2021 10:32 PM    Hemoglobin A1c 10.3 (H) 02/16/2021 01:10 PM    Glucose 115 (H) 01/07/2022 03:43 PM    Glucose -NF 01/07/2022 02:52 PM    Microalbumin/Creat ratio (mg/g creat) <18 01/07/2022 03:43 PM    Microalbumin,urine random <0.50 01/07/2022 03:43 PM    LDL, calculated 80.4 01/07/2022 03:43 PM    Creatinine 0.65 01/07/2022 03:43 PM      Current Outpatient Medications   Medication Sig    naproxen (NAPROSYN) 500 mg tablet Take 1 Tablet by mouth two (2) times daily (with meals). For pain. Gina 1 tab por boca 2 veces al alfie con comida por dolor.  amitriptyline (ELAVIL) 10 mg tablet Take 1 Tablet by mouth nightly. For hand pain. Gina 1 tab en la noche para dolor de las deysi.  glipiZIDE (GLUCOTROL) 5 mg tablet Take 1 Tablet by mouth Daily (before dinner). 30 minutes before dinner for diabetes. Gina 1 tab 30 min antes de comer la hong para diabetes.  levothyroxine (SYNTHROID) 50 mcg tablet Take 1 Tablet by mouth Daily (before breakfast).  metFORMIN ER (GLUCOPHAGE XR) 500 mg tablet TAKE 4 TABLETS BY MOUTH ONCE DAILY WITH  DINNER    Blood-Glucose Meter monitoring kit Sig: check blood sugar bid, please provide relion brand for self pay patient    SITagliptin (JANUVIA) 100 mg tablet Take 1 Tablet by mouth daily. (Patient not taking: Reported on 6/20/2022)    insulin syringe-needle U-100 1/2 mL 31 gauge x 15/64\" syrg 1 Syringe by Does Not Apply route daily. (Patient not taking: Reported on 6/20/2022)     No current facility-administered medications for this visit. No physical exam performed due to telephone visit. I affirm this is a Patient Initiated Episode with a Patient who has not had a related appointment within my department in the past 7 days or scheduled within the next 24 hours. MAYRA Lo expressed understanding and agreed to this plan.

## 2022-07-01 DIAGNOSIS — E11.9 DM TYPE 2, GOAL A1C TO BE DETERMINED (HCC): Primary | ICD-10-CM

## 2022-07-01 NOTE — PROGRESS NOTES
TPC medication to be ordered for pt:  Januvia 100 mg tab si po daily  Routing to PAP coordinator to process the rx request, thank you

## 2022-09-15 DIAGNOSIS — E11.65 TYPE 2 DIABETES MELLITUS WITH HYPERGLYCEMIA, WITH LONG-TERM CURRENT USE OF INSULIN (HCC): ICD-10-CM

## 2022-09-15 DIAGNOSIS — E11.65 TYPE 2 DIABETES MELLITUS WITH HYPERGLYCEMIA, WITH LONG-TERM CURRENT USE OF INSULIN (HCC): Primary | ICD-10-CM

## 2022-09-15 DIAGNOSIS — Z79.4 TYPE 2 DIABETES MELLITUS WITH HYPERGLYCEMIA, WITH LONG-TERM CURRENT USE OF INSULIN (HCC): ICD-10-CM

## 2022-09-15 DIAGNOSIS — Z79.4 TYPE 2 DIABETES MELLITUS WITH HYPERGLYCEMIA, WITH LONG-TERM CURRENT USE OF INSULIN (HCC): Primary | ICD-10-CM

## 2022-09-15 DIAGNOSIS — M79.643 PAIN OF HAND, UNSPECIFIED LATERALITY: ICD-10-CM

## 2022-09-15 RX ORDER — GLIPIZIDE 5 MG/1
TABLET ORAL
Qty: 90 TABLET | Refills: 0 | Status: SHIPPED | OUTPATIENT
Start: 2022-09-15

## 2022-09-15 RX ORDER — AMITRIPTYLINE HYDROCHLORIDE 10 MG/1
TABLET, FILM COATED ORAL
Qty: 90 TABLET | Refills: 0 | Status: SHIPPED | OUTPATIENT
Start: 2022-09-15

## 2022-09-26 ENCOUNTER — OFFICE VISIT (OUTPATIENT)
Dept: FAMILY MEDICINE CLINIC | Age: 42
End: 2022-09-26

## 2022-09-26 DIAGNOSIS — Z71.89 COUNSELING AND COORDINATION OF CARE: Primary | ICD-10-CM

## 2022-09-26 PROCEDURE — 99080 SPECIAL REPORTS OR FORMS: CPT | Performed by: NURSE PRACTITIONER

## 2022-09-26 NOTE — PROGRESS NOTES
AN financial screening has been completed. Patient has been instructed to call appointment line on or after 10/10/22. Patient has been screened for [facility]. Patient has requested Food (score 3). Resources have been sent via text.

## 2022-09-28 ENCOUNTER — PATIENT OUTREACH (OUTPATIENT)
Dept: FAMILY MEDICINE CLINIC | Age: 42
End: 2022-09-28

## 2022-10-10 NOTE — PROGRESS NOTES
I called pt today with Anke  # [phone] and gave message from provider. Pt is scheduled for tomorrow at [address] [place] at 2:10.  Cielo Negron RN Carac Counseling:  I discussed with the patient the risks of Carac including but not limited to erythema, scaling, itching, weeping, crusting, and pain.

## 2022-10-17 ENCOUNTER — HOSPITAL ENCOUNTER (OUTPATIENT)
Dept: LAB | Age: 42
Discharge: HOME | End: 2022-10-17

## 2022-10-17 ENCOUNTER — HOSPITAL ENCOUNTER (OUTPATIENT)
Dept: MAMMOGRAPHY | Age: 42
Discharge: HOME | End: 2022-10-17

## 2022-10-17 ENCOUNTER — TRANSCRIBE ORDER (OUTPATIENT)
Dept: MAMMOGRAPHY | Age: 42
End: 2022-10-17

## 2022-10-17 ENCOUNTER — OFFICE VISIT (OUTPATIENT)
Dept: FAMILY PLANNING/WOMEN'S HEALTH CLINIC | Age: 42
End: 2022-10-17

## 2022-10-17 DIAGNOSIS — Z12.31 VISIT FOR SCREENING MAMMOGRAM: Primary | ICD-10-CM

## 2022-10-17 DIAGNOSIS — Z01.419 ENCOUNTER FOR WELL WOMAN EXAM: Primary | ICD-10-CM

## 2022-10-17 DIAGNOSIS — Z12.31 VISIT FOR SCREENING MAMMOGRAM: ICD-10-CM

## 2022-10-17 PROCEDURE — 87624 HPV HI-RISK TYP POOLED RSLT: CPT

## 2022-10-17 PROCEDURE — 77067 SCR MAMMO BI INCL CAD: CPT

## 2022-10-17 PROCEDURE — 88175 CYTOPATH C/V AUTO FLUID REDO: CPT

## 2022-10-17 NOTE — PROGRESS NOTES
Assessment/Plan:    Diagnoses and all orders for this visit:    1. Encounter for well woman exam            ROGERS Gonzales expressed understanding of this plan. An AVS was printed and given to the patient.      ----------------------------------------------------------------------    Chief Complaint   Patient presents with    Well Woman       History of Present Illness:    EWL well woman exam  No change to her health this year   She is still having irregular periods, she is not taking OCP as rxd by gyn because \"they didn't help\"  She is having light spotting only and it is irregular  She is s/p 3 c'sections  She is having hot flashes  She is in a safe relationship and no risk of DV  No breast complaints      Past Medical History:   Diagnosis Date    Diabetes ([facility] [address]     GDM (gestational diabetes mellitus)     IUD (intrauterine device) since 2013     Essure       Current Outpatient Medications   Medication Sig Dispense Refill    amitriptyline (ELAVIL) 10 mg tablet TAKE 1 TABLET BY MOUTH EVERY DAY AT BEDTIME 90 Tablet 0    glipiZIDE (GLUCOTROL) 5 mg tablet TAKE 1 TABLET BY MOUTH ONCE DAILY 30  MIN  BEFORE  DINNER  FOR  DIABETES 90 Tablet 0    SITagliptin (JANUVIA) 100 mg tablet Take 1 Tablet by mouth daily. TPC medication, no print 90 Tablet 3    naproxen (NAPROSYN) 500 mg tablet Take 1 Tablet by mouth two (2) times daily (with meals). For pain. Gina 1 tab por boca 2 veces al alfie con comida por dolor. 60 Tablet 0    levothyroxine (SYNTHROID) 50 mcg tablet Take 1 Tablet by mouth Daily (before breakfast). 90 Tablet 1    metFORMIN ER (GLUCOPHAGE XR) 500 mg tablet TAKE 4 TABLETS BY MOUTH ONCE DAILY WITH  DINNER 360 Tablet 3    Blood-Glucose Meter monitoring kit Sig: check blood sugar bid, please provide relion brand for self pay patient 1 Kit 0    insulin syringe-needle U-100 1/2 mL 31 gauge x 15/64\" syrg 1 Syringe by Does Not Apply route daily.  (Patient not taking: Reported on 6/20/2022) 90 Pen Needle 3       Allergies   Allergen Reactions    Lisinopril Cough       Social History     Tobacco Use    Smoking status: Never    Smokeless tobacco: Never   Substance Use Topics    Alcohol use: No     Alcohol/week: 0.0 standard drinks    Drug use: No       Family History   Problem Relation Age of Onset    No Known Problems Mother     No Known Problems Father     Breast Cancer Maternal Aunt        Physical Exam:     Visit Vitals      A&Ox3  WDWN NAD  Respirations normal and non labored  Breast exam- bernadette neg for mass, tenderness, skin color changes, dimpling or retractions  Pelvic exam- ext neg for lesion or discharge. Cervix and vagina neg for lesion or discharge.  Uterus and adnexal exam neg for mass or tenderness

## 2022-10-17 NOTE — PROGRESS NOTES
EVERY WOMANS LIFE HISTORY QUESTIONNAIRE       No Yes Comments   Has a doctor ever seen or felt anything wrong with your breast? [x]                                  []                                     Have you ever had a breast biopsy? [x]                                  []                                          When and where was last mammogram performed? [facility]    Have you ever been told that there was a problem on your mammogram?   No Yes Comments   [x]                                  []                                       Do you have breast implants? No Yes Comments   [x]                                  []                                       When was your last Pap test performed? 12/16/19 [facility]    Have you ever had an abnormal Pap test?   No Yes Comments   [x]                                  []                                       Have you had a hysterectomy? No Yes Comments (why)   [x]                                  []                                       Have you been through menopause? No Yes Date of LMP   x                                  []                                  Irregular r/t Essure     Did your mother take HOWARD? No Yes Unknown   []                                  []                                  x     Do you have a history of HIV exposure? No Yes    [x]                                  []                                       Have you ever been diagnosed with any type of Cancer   No Yes Comments (type,when,where,type of treatment   [x]                                  []                                          Has a family member been diagnosed with breast or ovarian cancer?    No Yes Comments (which family members, and type   [x]                                  []                                       Are you taking hormone replacement therapy (HRT)     No Yes Comments   [x]                                  [] How many times have you been pregnant? 3       Number of live births ? 3    Are you experiencing any of the following? No Yes Comments   Nipple Discharge [x]                                  []                                     Breast Lump/Masses [x]                                  []                                     Breast Skin Changes [x]                                  []                                          No Yes Comments   Vaginal Discharge [x]                                  []                                     Abnormal/unusual vaginal bleeding [x]                                  []                                         Are you experiencing any other health problems? Diabetes      Age at first period? 14  Age at first birth? 21    Ht--5'1\"    Wt--80.8lbs    [facility]  number is [id number].

## 2022-11-02 ENCOUNTER — TELEPHONE (OUTPATIENT)
Dept: FAMILY MEDICINE CLINIC | Age: 42
End: 2022-11-02

## 2022-11-02 ENCOUNTER — DOCUMENTATION ONLY (OUTPATIENT)
Dept: FAMILY MEDICINE CLINIC | Age: 42
End: 2022-11-02

## 2022-11-02 NOTE — TELEPHONE ENCOUNTER
SDOH f/up. Patient does not remember food resources. OW explained process to get food and provided food resources information. Sent via text message after telephone encounter. Food score (4).

## 2023-03-03 ENCOUNTER — DOCUMENTATION ONLY (OUTPATIENT)
Dept: FAMILY MEDICINE CLINIC | Age: 43
End: 2023-03-03

## 2023-03-08 ENCOUNTER — TELEPHONE (OUTPATIENT)
Dept: FAMILY MEDICINE CLINIC | Age: 43
End: 2023-03-08

## 2023-03-08 NOTE — TELEPHONE ENCOUNTER
Received an email from our contact at [facility] that this pt tried to make an eye appt with [facility] eye clinic. I called the pt, no answer. I left a VM with our appt line number and instructed her to call to make an appt with the [facility] (she hasn't been seen since 9/2022) and then the provider can refer her for an eye appointment.

## 2023-04-03 PROBLEM — E11.21 TYPE 2 DIABETES WITH NEPHROPATHY (HCC): Status: RESOLVED | Noted: 2018-05-22 | Resolved: 2021-01-14

## 2023-05-19 RX ORDER — GLIPIZIDE 5 MG/1
TABLET ORAL
COMMUNITY
Start: 2022-09-15

## 2023-05-19 RX ORDER — LEVOTHYROXINE SODIUM 0.05 MG/1
50 TABLET ORAL
COMMUNITY
Start: 2022-06-20

## 2023-05-19 RX ORDER — AMITRIPTYLINE HYDROCHLORIDE 10 MG/1
1 TABLET, FILM COATED ORAL NIGHTLY
COMMUNITY
Start: 2022-09-15

## 2023-05-19 RX ORDER — METFORMIN HYDROCHLORIDE 500 MG/1
TABLET, EXTENDED RELEASE ORAL
COMMUNITY
Start: 2022-06-20

## 2023-05-19 RX ORDER — NAPROXEN 500 MG/1
500 TABLET ORAL 2 TIMES DAILY WITH MEALS
COMMUNITY
Start: 2022-06-20

## 2024-01-24 ENCOUNTER — APPOINTMENT (OUTPATIENT)
Facility: HOSPITAL | Age: 44
End: 2024-01-24
Payer: COMMERCIAL

## 2024-01-24 ENCOUNTER — HOSPITAL ENCOUNTER (EMERGENCY)
Facility: HOSPITAL | Age: 44
Discharge: HOME | End: 2024-01-24
Attending: EMERGENCY MEDICINE
Payer: COMMERCIAL

## 2024-01-24 VITALS
OXYGEN SATURATION: 97 % | TEMPERATURE: 98.1 F | HEART RATE: 77 BPM | WEIGHT: 182.32 LBS | SYSTOLIC BLOOD PRESSURE: 128 MMHG | DIASTOLIC BLOOD PRESSURE: 86 MMHG | BODY MASS INDEX: 38.12 KG/M2 | RESPIRATION RATE: 16 BRPM

## 2024-01-24 DIAGNOSIS — R07.9 CHEST PAIN, UNSPECIFIED TYPE: Primary | ICD-10-CM

## 2024-01-24 DIAGNOSIS — N64.4 BREAST PAIN, LEFT: ICD-10-CM

## 2024-01-24 DIAGNOSIS — E11.65 TYPE 2 DIABETES MELLITUS WITH HYPERGLYCEMIA, WITHOUT LONG-TERM CURRENT USE OF INSULIN (HCC): ICD-10-CM

## 2024-01-24 LAB
ALBUMIN SERPL-MCNC: 4.3 G/DL (ref 3.5–5)
ALBUMIN/GLOB SERPL: 1.2 (ref 1.1–2.2)
ALP SERPL-CCNC: 116 U/L (ref 45–117)
ALT SERPL-CCNC: 31 U/L (ref 12–78)
ANION GAP SERPL CALC-SCNC: 6 MMOL/L (ref 5–15)
APPEARANCE UR: CLEAR
AST SERPL-CCNC: 17 U/L (ref 15–37)
BACTERIA URNS QL MICRO: NEGATIVE /HPF
BASOPHILS # BLD: 0 K/UL (ref 0–0.1)
BASOPHILS NFR BLD: 0 % (ref 0–1)
BILIRUB SERPL-MCNC: 0.6 MG/DL (ref 0.2–1)
BILIRUB UR QL: NEGATIVE
BUN SERPL-MCNC: 13 MG/DL (ref 6–20)
BUN/CREAT SERPL: 19 (ref 12–20)
CALCIUM SERPL-MCNC: 10 MG/DL (ref 8.5–10.1)
CHLORIDE SERPL-SCNC: 102 MMOL/L (ref 97–108)
CHP ED QC CHECK: NORMAL
CO2 SERPL-SCNC: 28 MMOL/L (ref 21–32)
COLOR UR: ABNORMAL
COMMENT:: NORMAL
CREAT SERPL-MCNC: 0.68 MG/DL (ref 0.55–1.02)
DIFFERENTIAL METHOD BLD: NORMAL
EKG ATRIAL RATE: 74 BPM
EKG DIAGNOSIS: NORMAL
EKG P AXIS: 57 DEGREES
EKG P-R INTERVAL: 158 MS
EKG Q-T INTERVAL: 378 MS
EKG QRS DURATION: 86 MS
EKG QTC CALCULATION (BAZETT): 419 MS
EKG R AXIS: 37 DEGREES
EKG T AXIS: 71 DEGREES
EKG VENTRICULAR RATE: 74 BPM
EOSINOPHIL # BLD: 0.1 K/UL (ref 0–0.4)
EOSINOPHIL NFR BLD: 2 % (ref 0–7)
EPITH CASTS URNS QL MICRO: ABNORMAL /LPF
ERYTHROCYTE [DISTWIDTH] IN BLOOD BY AUTOMATED COUNT: 12.1 % (ref 11.5–14.5)
GLOBULIN SER CALC-MCNC: 3.7 G/DL (ref 2–4)
GLUCOSE BLD STRIP.AUTO-MCNC: 306 MG/DL (ref 65–117)
GLUCOSE BLD-MCNC: 306 MG/DL
GLUCOSE SERPL-MCNC: 332 MG/DL (ref 65–100)
GLUCOSE UR STRIP.AUTO-MCNC: >1000 MG/DL
HCT VFR BLD AUTO: 43 % (ref 35–47)
HGB BLD-MCNC: 15.2 G/DL (ref 11.5–16)
HGB UR QL STRIP: NEGATIVE
HYALINE CASTS URNS QL MICRO: ABNORMAL /LPF (ref 0–5)
IMM GRANULOCYTES # BLD AUTO: 0 K/UL (ref 0–0.04)
IMM GRANULOCYTES NFR BLD AUTO: 0 % (ref 0–0.5)
KETONES UR QL STRIP.AUTO: NEGATIVE MG/DL
LEUKOCYTE ESTERASE UR QL STRIP.AUTO: NEGATIVE
LYMPHOCYTES # BLD: 2.7 K/UL (ref 0.8–3.5)
LYMPHOCYTES NFR BLD: 41 % (ref 12–49)
MCH RBC QN AUTO: 30.2 PG (ref 26–34)
MCHC RBC AUTO-ENTMCNC: 35.3 G/DL (ref 30–36.5)
MCV RBC AUTO: 85.5 FL (ref 80–99)
MONOCYTES # BLD: 0.5 K/UL (ref 0–1)
MONOCYTES NFR BLD: 7 % (ref 5–13)
NEUTS SEG # BLD: 3.3 K/UL (ref 1.8–8)
NEUTS SEG NFR BLD: 50 % (ref 32–75)
NITRITE UR QL STRIP.AUTO: NEGATIVE
NRBC # BLD: 0 K/UL (ref 0–0.01)
NRBC BLD-RTO: 0 PER 100 WBC
PH UR STRIP: 5.5 (ref 5–8)
PLATELET # BLD AUTO: 243 K/UL (ref 150–400)
PMV BLD AUTO: 11.7 FL (ref 8.9–12.9)
POTASSIUM SERPL-SCNC: 4 MMOL/L (ref 3.5–5.1)
PROT SERPL-MCNC: 8 G/DL (ref 6.4–8.2)
PROT UR STRIP-MCNC: NEGATIVE MG/DL
RBC # BLD AUTO: 5.03 M/UL (ref 3.8–5.2)
RBC #/AREA URNS HPF: ABNORMAL /HPF (ref 0–5)
SERVICE CMNT-IMP: ABNORMAL
SODIUM SERPL-SCNC: 136 MMOL/L (ref 136–145)
SP GR UR REFRACTOMETRY: 1.02 (ref 1–1.03)
SPECIMEN HOLD: NORMAL
SPECIMEN HOLD: NORMAL
TROPONIN I SERPL HS-MCNC: <4 NG/L (ref 0–51)
UROBILINOGEN UR QL STRIP.AUTO: 0.2 EU/DL (ref 0.2–1)
WBC # BLD AUTO: 6.7 K/UL (ref 3.6–11)
WBC URNS QL MICRO: ABNORMAL /HPF (ref 0–4)

## 2024-01-24 PROCEDURE — 99285 EMERGENCY DEPT VISIT HI MDM: CPT

## 2024-01-24 PROCEDURE — 93010 ELECTROCARDIOGRAM REPORT: CPT | Performed by: INTERNAL MEDICINE

## 2024-01-24 PROCEDURE — 36415 COLL VENOUS BLD VENIPUNCTURE: CPT

## 2024-01-24 PROCEDURE — 71046 X-RAY EXAM CHEST 2 VIEWS: CPT

## 2024-01-24 PROCEDURE — 85025 COMPLETE CBC W/AUTO DIFF WBC: CPT

## 2024-01-24 PROCEDURE — 80053 COMPREHEN METABOLIC PANEL: CPT

## 2024-01-24 PROCEDURE — 93005 ELECTROCARDIOGRAM TRACING: CPT | Performed by: STUDENT IN AN ORGANIZED HEALTH CARE EDUCATION/TRAINING PROGRAM

## 2024-01-24 PROCEDURE — 81001 URINALYSIS AUTO W/SCOPE: CPT

## 2024-01-24 PROCEDURE — 82962 GLUCOSE BLOOD TEST: CPT

## 2024-01-24 PROCEDURE — 84484 ASSAY OF TROPONIN QUANT: CPT

## 2024-01-24 ASSESSMENT — ENCOUNTER SYMPTOMS
EYE PAIN: 0
COUGH: 0
VOMITING: 0
ABDOMINAL PAIN: 0
NAUSEA: 0
SORE THROAT: 0
DIARRHEA: 0
SHORTNESS OF BREATH: 0

## 2024-01-24 ASSESSMENT — PAIN DESCRIPTION - ORIENTATION: ORIENTATION: LEFT

## 2024-01-24 ASSESSMENT — PAIN - FUNCTIONAL ASSESSMENT: PAIN_FUNCTIONAL_ASSESSMENT: ACTIVITIES ARE NOT PREVENTED

## 2024-01-24 ASSESSMENT — PAIN DESCRIPTION - LOCATION: LOCATION: BREAST

## 2024-01-24 ASSESSMENT — HEART SCORE: ECG: 0

## 2024-01-24 ASSESSMENT — PAIN DESCRIPTION - FREQUENCY: FREQUENCY: CONTINUOUS

## 2024-01-24 ASSESSMENT — PAIN DESCRIPTION - DESCRIPTORS: DESCRIPTORS: ACHING;DULL

## 2024-01-24 ASSESSMENT — PAIN SCALES - GENERAL: PAINLEVEL_OUTOF10: 3

## 2024-01-24 ASSESSMENT — PAIN DESCRIPTION - PAIN TYPE: TYPE: ACUTE PAIN

## 2024-01-24 ASSESSMENT — PAIN DESCRIPTION - ONSET: ONSET: ON-GOING

## 2024-01-24 NOTE — DISCHARGE INSTRUCTIONS
Continue to monitor symptoms at home.  Take Advil or Tylenol as needed for pain.  Follow-up with PCP and return with any changes or worsening.      [facility] Health Departments     For adult and child immunizations, family planning, TB screening, STD testing and women's health services.   [facility]: [facility] [phone]      [facility] [phone]    [facility]   [phone]    [facility]   [phone]    [place]   [phone]    [facility]: [place] [phone]      [place] [phone]      [place] [phone]          General Health Clinics     For primary care services, woman and child wellness, and some clinics providing specialty care.   [facility] [address] [facility] [phone]   [facility] [address] [place] [phone]   [facility] [address] [phone]   [facility] [address] [phone]   [facility] [address] [phone]   [facility] [address] [phone]   [place] [facility] [address] [phone]   [facility] [address] [facility] [address] [phone], [phone]     [place] [address] [phone]     [place] [address] [facility] [phone]   [facility] [address] [phone]   [facility]  [address] [phone]   [employer] [facility] ([email or web address]) [phone]         Sexual Health/Woman Wellness Clinics    For STD/HIV testing and treatment, pregnancy testing and services, men's health, birth control services, LGBT services, and hepatitis/HPV vaccine services.   [facility] [address] [facility] [address] [phone]   [facility] [address] [phone]   [facility] [address] [phone]   [facility] [address] 5th floor [phone]   Pregnancy

## 2024-01-24 NOTE — ED PROVIDER NOTES
Patient expresses no further concerns at this time and will be discharged with AVS and education paperwork.       Amount and/or Complexity of Data Reviewed  Labs: ordered. Decision-making details documented in ED Course.  Radiology: ordered.  ECG/medicine tests: ordered.            REASSESSMENT     ED Course as of 01/24/24 1725   Wed Jan 24, 2024   1458 EKG 1458: Rate 74, Normal sinus rhythm, No ST segment or T wave abnormalities. Normal EKG.    [DK]   1612 Troponin, High Sensitivity: <4 [AH]   1710 CXR with no acute abnormalities [AH]      ED Course User Index  [AH] Alecia Burton PA  [DK] Wally Galvan MD           CONSULTS:  None    PROCEDURES:  Unless otherwise noted below, none     Procedures      FINAL IMPRESSION      1. Chest pain, unspecified type    2. Type 2 diabetes mellitus with hyperglycemia, without long-term current use of insulin (HCC)    3. Breast pain, left          DISPOSITION/PLAN   DISPOSITION Decision To Discharge 01/24/2024 05:13:45 PM      PATIENT REFERRED TO:  Kristi Carballo, APRN - NP  [address] [facility] [id number] [phone]    Schedule an appointment as soon as possible for a visit   As follow up in next week    [facility]  [address] [address]  Schedule an appointment as soon as possible for a visit       [facility] EMERGENCY DEP  [address] [id number] [phone]  Go to   If symptoms worsen or change      DISCHARGE MEDICATIONS:  New Prescriptions    No medications on file         (Please note that portions of this note were completed with a voice recognition program.  Efforts were made to edit the dictations but occasionally words are mis-transcribed.)    IVÁN ANDERSEN (electronically signed)  Emergency Attending Physician / Physician Assistant / Nurse Practitioner              Alecia Burton PA  01/24/24 [id number]

## 2024-01-24 NOTE — ED TRIAGE NOTES
Patient presents from [facility] with complaints of left breast pain since Sunday. Patient denies SOB, reports she only has pain with touch or pressure

## 2024-02-01 ENCOUNTER — TELEPHONE (OUTPATIENT)
Age: 44
End: 2024-02-01

## 2024-02-01 NOTE — TELEPHONE ENCOUNTER
[facility] received a call from the patient and is not qualified for [facility].     Service needed: Mammogram     What resources were given: Central Scheduling    Reason for not qualifying: Private Insurance [employer].     Will need financial assistance for deductible.

## 2025-06-09 ENCOUNTER — APPOINTMENT (OUTPATIENT)
Facility: HOSPITAL | Age: 45
End: 2025-06-09

## 2025-06-09 ENCOUNTER — HOSPITAL ENCOUNTER (EMERGENCY)
Facility: HOSPITAL | Age: 45
Discharge: HOME | End: 2025-06-09
Attending: EMERGENCY MEDICINE

## 2025-06-09 VITALS
OXYGEN SATURATION: 98 % | TEMPERATURE: 97.5 F | HEART RATE: 89 BPM | RESPIRATION RATE: 18 BRPM | DIASTOLIC BLOOD PRESSURE: 88 MMHG | WEIGHT: 183.64 LBS | BODY MASS INDEX: 38.39 KG/M2 | SYSTOLIC BLOOD PRESSURE: 136 MMHG

## 2025-06-09 DIAGNOSIS — I82.412 ACUTE DEEP VEIN THROMBOSIS (DVT) OF FEMORAL VEIN OF LEFT LOWER EXTREMITY (HCC): Primary | ICD-10-CM

## 2025-06-09 PROCEDURE — 93971 EXTREMITY STUDY: CPT

## 2025-06-09 PROCEDURE — 99284 EMERGENCY DEPT VISIT MOD MDM: CPT

## 2025-06-09 ASSESSMENT — PAIN DESCRIPTION - DESCRIPTORS: DESCRIPTORS: ACHING

## 2025-06-09 ASSESSMENT — PAIN DESCRIPTION - PAIN TYPE: TYPE: ACUTE PAIN

## 2025-06-09 ASSESSMENT — PAIN DESCRIPTION - LOCATION: LOCATION: LEG

## 2025-06-09 ASSESSMENT — PAIN - FUNCTIONAL ASSESSMENT
PAIN_FUNCTIONAL_ASSESSMENT: 0-10
PAIN_FUNCTIONAL_ASSESSMENT: ACTIVITIES ARE NOT PREVENTED

## 2025-06-09 ASSESSMENT — PAIN DESCRIPTION - ONSET: ONSET: ON-GOING

## 2025-06-09 ASSESSMENT — PAIN DESCRIPTION - ORIENTATION: ORIENTATION: LEFT;LOWER

## 2025-06-09 ASSESSMENT — PAIN DESCRIPTION - FREQUENCY: FREQUENCY: CONTINUOUS

## 2025-06-09 ASSESSMENT — PAIN SCALES - GENERAL: PAINLEVEL_OUTOF10: 5

## 2025-06-09 NOTE — ED TRIAGE NOTES
Patient presents from home with complaints of  swelling in her left calf are that started June 1st and on Saturday June 7th her foot stared swelling on the left side as well. Denies injury or trauma, reports she was cooking before this happened and felt a \"pulling\" in the back of her knee      utilized in triage

## 2025-06-09 NOTE — DISCHARGE INSTRUCTIONS
As discussed today, ultrasound shows a blood clot in your left leg from your thigh down  to your calf - it is present in the femoral popliteal posterior tibial and peroneal veins.  Eliquis starter pack was prescribed.  It is important that you take it as prescribed and schedule follow-up with your primary care doctor.  It is possible that your blood clot is provoked due to recent travel however I still recommend following up with hematology.  Please keep the foot elevated and purchase compression stockings to help with pain and inflammation.

## 2025-06-24 PROCEDURE — 36415 COLL VENOUS BLD VENIPUNCTURE: CPT

## 2025-06-24 PROCEDURE — 80061 LIPID PANEL: CPT

## 2025-06-24 PROCEDURE — 84443 ASSAY THYROID STIM HORMONE: CPT

## 2025-06-24 PROCEDURE — 85025 COMPLETE CBC W/AUTO DIFF WBC: CPT

## 2025-06-24 PROCEDURE — 80053 COMPREHEN METABOLIC PANEL: CPT

## 2025-06-25 ENCOUNTER — HOSPITAL ENCOUNTER (OUTPATIENT)
Facility: HOSPITAL | Age: 45
Setting detail: SPECIMEN
Discharge: HOME | End: 2025-06-28

## 2025-06-25 LAB
ALBUMIN SERPL-MCNC: 3.8 G/DL (ref 3.5–5)
ALBUMIN/GLOB SERPL: 1.1 (ref 1.1–2.2)
ALP SERPL-CCNC: 123 U/L (ref 45–117)
ALT SERPL-CCNC: 40 U/L (ref 12–78)
ANION GAP SERPL CALC-SCNC: 7 MMOL/L (ref 2–12)
AST SERPL-CCNC: 24 U/L (ref 15–37)
BASOPHILS # BLD: 0.02 K/UL (ref 0–0.1)
BASOPHILS NFR BLD: 0.3 % (ref 0–1)
BILIRUB SERPL-MCNC: 0.8 MG/DL (ref 0.2–1)
BUN SERPL-MCNC: 13 MG/DL (ref 6–20)
BUN/CREAT SERPL: 21 (ref 12–20)
CALCIUM SERPL-MCNC: 9.6 MG/DL (ref 8.5–10.1)
CHLORIDE SERPL-SCNC: 103 MMOL/L (ref 97–108)
CHOLEST SERPL-MCNC: 240 MG/DL
CO2 SERPL-SCNC: 27 MMOL/L (ref 21–32)
CREAT SERPL-MCNC: 0.63 MG/DL (ref 0.55–1.02)
DIFFERENTIAL METHOD BLD: NORMAL
EOSINOPHIL # BLD: 0.18 K/UL (ref 0–0.4)
EOSINOPHIL NFR BLD: 3.1 % (ref 0–7)
ERYTHROCYTE [DISTWIDTH] IN BLOOD BY AUTOMATED COUNT: 12.6 % (ref 11.5–14.5)
GLOBULIN SER CALC-MCNC: 3.6 G/DL (ref 2–4)
GLUCOSE SERPL-MCNC: 336 MG/DL (ref 65–100)
HCT VFR BLD AUTO: 42.3 % (ref 35–47)
HDLC SERPL-MCNC: 55 MG/DL
HDLC SERPL: 4.4 (ref 0–5)
HGB BLD-MCNC: 14 G/DL (ref 11.5–16)
IMM GRANULOCYTES # BLD AUTO: 0.01 K/UL (ref 0–0.04)
IMM GRANULOCYTES NFR BLD AUTO: 0.2 % (ref 0–0.5)
LDLC SERPL CALC-MCNC: 132.2 MG/DL (ref 0–100)
LYMPHOCYTES # BLD: 1.99 K/UL (ref 0.8–3.5)
LYMPHOCYTES NFR BLD: 33.8 % (ref 12–49)
MCH RBC QN AUTO: 29.2 PG (ref 26–34)
MCHC RBC AUTO-ENTMCNC: 33.1 G/DL (ref 30–36.5)
MCV RBC AUTO: 88.3 FL (ref 80–99)
MONOCYTES # BLD: 0.39 K/UL (ref 0–1)
MONOCYTES NFR BLD: 6.6 % (ref 5–13)
NEUTS SEG # BLD: 3.29 K/UL (ref 1.8–8)
NEUTS SEG NFR BLD: 56 % (ref 32–75)
NRBC # BLD: 0 K/UL (ref 0–0.01)
NRBC BLD-RTO: 0 PER 100 WBC
PLATELET # BLD AUTO: 264 K/UL (ref 150–400)
PMV BLD AUTO: 12.5 FL (ref 8.9–12.9)
POTASSIUM SERPL-SCNC: 4.4 MMOL/L (ref 3.5–5.1)
PROT SERPL-MCNC: 7.4 G/DL (ref 6.4–8.2)
RBC # BLD AUTO: 4.79 M/UL (ref 3.8–5.2)
SODIUM SERPL-SCNC: 137 MMOL/L (ref 136–145)
TRIGL SERPL-MCNC: 264 MG/DL
TSH SERPL DL<=0.05 MIU/L-ACNC: 2.04 UIU/ML (ref 0.36–3.74)
VLDLC SERPL CALC-MCNC: 52.8 MG/DL
WBC # BLD AUTO: 5.9 K/UL (ref 3.6–11)